# Patient Record
Sex: MALE | Race: WHITE | NOT HISPANIC OR LATINO | Employment: OTHER | URBAN - METROPOLITAN AREA
[De-identification: names, ages, dates, MRNs, and addresses within clinical notes are randomized per-mention and may not be internally consistent; named-entity substitution may affect disease eponyms.]

---

## 2019-01-15 ENCOUNTER — OFFICE VISIT (OUTPATIENT)
Dept: URGENT CARE | Facility: CLINIC | Age: 69
End: 2019-01-15
Payer: MEDICARE

## 2019-01-15 VITALS
TEMPERATURE: 97.8 F | DIASTOLIC BLOOD PRESSURE: 90 MMHG | HEART RATE: 61 BPM | OXYGEN SATURATION: 96 % | RESPIRATION RATE: 16 BRPM | HEIGHT: 77 IN | WEIGHT: 234 LBS | BODY MASS INDEX: 27.63 KG/M2 | SYSTOLIC BLOOD PRESSURE: 140 MMHG

## 2019-01-15 DIAGNOSIS — J98.8 RTI (RESPIRATORY TRACT INFECTION): ICD-10-CM

## 2019-01-15 DIAGNOSIS — I10 ESSENTIAL HYPERTENSION: ICD-10-CM

## 2019-01-15 PROCEDURE — 99204 OFFICE O/P NEW MOD 45 MIN: CPT | Performed by: FAMILY MEDICINE

## 2019-01-15 RX ORDER — ASPIRIN 300 MG/1
300 SUPPOSITORY RECTAL DAILY
COMMUNITY
End: 2019-02-11

## 2019-01-15 RX ORDER — AMOXICILLIN AND CLAVULANATE POTASSIUM 875; 125 MG/1; MG/1
1 TABLET, FILM COATED ORAL 2 TIMES DAILY
Qty: 14 TAB | Refills: 0 | Status: SHIPPED | OUTPATIENT
Start: 2019-01-15 | End: 2019-01-22

## 2019-01-15 RX ORDER — ALLOPURINOL 100 MG/1
100 TABLET ORAL DAILY
COMMUNITY
End: 2019-02-11

## 2019-01-15 RX ORDER — CHLORAL HYDRATE 500 MG
1000 CAPSULE ORAL
Status: ON HOLD | COMMUNITY
End: 2019-10-02

## 2019-01-15 ASSESSMENT — ENCOUNTER SYMPTOMS
FEVER: 0
CHILLS: 0
SORE THROAT: 1
FOCAL WEAKNESS: 0
ORTHOPNEA: 0
SHORTNESS OF BREATH: 0
COUGH: 1
HEMOPTYSIS: 0
DIZZINESS: 0

## 2019-01-15 NOTE — PROGRESS NOTES
"Subjective:      Gopal Valerio is a 68 y.o. male who presents with Sinus Pain (cold, sinus issues x 2 weeks)    - This is a very pleasant, well and non-toxic appearing 68 y.o. male with complaints of 2 wks w/ cough/ST/sinus congestion. Improving but going out of town and worried will get worse. No NVFC/cp/sob    - Hx HTN, stable on meds.           ALLERGIES:  Patient has no allergy information on record.     PMH:  History reviewed. No pertinent past medical history.     PSH:  History reviewed. No pertinent surgical history.    MEDS:    Current Outpatient Prescriptions:   •  Omega-3 Fatty Acids (FISH OIL) 1000 MG Cap capsule, Take 1,000 mg by mouth 3 times a day, with meals., Disp: , Rfl:   •  aspirin (ASA) 300 MG Suppos, Insert 300 mg in rectum every day., Disp: , Rfl:   •  amoxicillin-clavulanate (AUGMENTIN) 875-125 MG Tab, Take 1 Tab by mouth 2 times a day for 7 days., Disp: 14 Tab, Rfl: 0    ** I have documented what I find to be significant in regards to past medical, social, family and surgical history  in my HPI or under PMH/PSH/FH review section, otherwise it is contributory **             HPI    Review of Systems   Constitutional: Negative for chills and fever.   HENT: Positive for congestion and sore throat.    Respiratory: Positive for cough. Negative for hemoptysis and shortness of breath.    Cardiovascular: Negative for chest pain and orthopnea.   Neurological: Negative for dizziness and focal weakness.   All other systems reviewed and are negative.         Objective:     /90 (BP Location: Right arm, Patient Position: Sitting, BP Cuff Size: Adult)   Pulse 61   Temp 36.6 °C (97.8 °F) (Temporal)   Resp 16   Ht 1.956 m (6' 5\")   Wt 106.1 kg (234 lb)   SpO2 96%   BMI 27.75 kg/m²      Physical Exam   Constitutional: He appears well-developed. No distress.   HENT:   Head: Normocephalic and atraumatic.   Nose: Nose normal.   Mouth/Throat: Oropharynx is clear and moist. No oropharyngeal exudate. "   Eyes: Conjunctivae are normal.   Neck: Neck supple.   Cardiovascular: Regular rhythm and normal heart sounds.  Exam reveals no gallop and no friction rub.    No murmur heard.  Pulmonary/Chest: Effort normal and breath sounds normal. No respiratory distress. He has no wheezes. He has no rales. He exhibits no tenderness.   Neurological: He is alert. No cranial nerve deficit. He exhibits normal muscle tone.   Skin: Skin is warm and dry.   Psychiatric: He has a normal mood and affect. Judgment normal.   Nursing note and vitals reviewed.              Assessment/Plan:         1. RTI (respiratory tract infection)  amoxicillin-clavulanate (AUGMENTIN) 875-125 MG Tab   2. Essential hypertension           Patient given contingency paper Rx for abx. Discussed when to fill.      Dx & d/c instructions discussed w/ patient and/or family members.     ER precautions (worsening signs symptoms and when to go to ER) discussed.    Follow up w/ PCP in 2-3 days to make sure symptoms improving and no further intervention/treatment and/or work-up needed was advised, ER if feeling worse or not improving in 2 days.    Possible side effects (i.e. Rash, GI upset/constipation, sedation, elevation of BP or sugars) of any medications given discussed.     Patient left in stable condition

## 2019-02-11 ENCOUNTER — OFFICE VISIT (OUTPATIENT)
Dept: MEDICAL GROUP | Facility: MEDICAL CENTER | Age: 69
End: 2019-02-11
Payer: MEDICARE

## 2019-02-11 VITALS
BODY MASS INDEX: 27.63 KG/M2 | OXYGEN SATURATION: 99 % | TEMPERATURE: 97.1 F | SYSTOLIC BLOOD PRESSURE: 160 MMHG | HEIGHT: 77 IN | WEIGHT: 234 LBS | RESPIRATION RATE: 14 BRPM | HEART RATE: 60 BPM | DIASTOLIC BLOOD PRESSURE: 102 MMHG

## 2019-02-11 DIAGNOSIS — I10 ESSENTIAL HYPERTENSION: ICD-10-CM

## 2019-02-11 DIAGNOSIS — Z86.79 HISTORY OF VARICOCELE: ICD-10-CM

## 2019-02-11 DIAGNOSIS — Z12.5 ENCOUNTER FOR SCREENING FOR MALIGNANT NEOPLASM OF PROSTATE: ICD-10-CM

## 2019-02-11 DIAGNOSIS — I48.0 PAROXYSMAL ATRIAL FIBRILLATION (HCC): ICD-10-CM

## 2019-02-11 DIAGNOSIS — Z23 NEED FOR VACCINATION: ICD-10-CM

## 2019-02-11 DIAGNOSIS — Z00.00 WELL ADULT EXAM: ICD-10-CM

## 2019-02-11 DIAGNOSIS — C44.310 BASAL CELL CARCINOMA (BCC) OF SKIN OF FACE, UNSPECIFIED PART OF FACE: ICD-10-CM

## 2019-02-11 PROCEDURE — 99204 OFFICE O/P NEW MOD 45 MIN: CPT | Performed by: INTERNAL MEDICINE

## 2019-02-11 RX ORDER — AMLODIPINE BESYLATE 5 MG/1
5 TABLET ORAL DAILY
COMMUNITY
End: 2019-02-11 | Stop reason: SDUPTHER

## 2019-02-11 RX ORDER — ASPIRIN 81 MG/1
81 TABLET, CHEWABLE ORAL DAILY
Qty: 100 TAB | Refills: 3 | Status: ON HOLD | OUTPATIENT
Start: 2019-02-11 | End: 2019-10-02

## 2019-02-11 RX ORDER — AMLODIPINE BESYLATE 5 MG/1
5 TABLET ORAL DAILY
Qty: 90 TAB | Refills: 3 | Status: SHIPPED | OUTPATIENT
Start: 2019-02-11 | End: 2019-03-11

## 2019-02-11 ASSESSMENT — PATIENT HEALTH QUESTIONNAIRE - PHQ9: CLINICAL INTERPRETATION OF PHQ2 SCORE: 0

## 2019-02-11 NOTE — LETTER
[facility]  Radha Culp M.D.  [address]  [place] [id number]  Fax: [phone]   Authorization for Release/Disclosure of   Protected Health Information   Name: LUCILLE MALLORY JR. : 1950 SSN: [id number]   Address: [address] [place] [address] [place] [id number] Phone:    [phone] (home)    I authorize the entity listed below to release/disclose the PHI below to:   [facility]/Radha Culp M.D. and Radha Culp M.D.   Provider or Entity Name:     Address   City, State, Zip   Phone:      Fax:     Reason for request: continuity of care   Information to be released:    [  ] LAST COLONOSCOPY,  including any PATH REPORT and follow-up  [  ] LAST FIT/COLOGUARD RESULT [  ] LAST DEXA  [  ] LAST MAMMOGRAM  [  ] LAST PAP  [  ] LAST LABS [  ] RETINA EXAM REPORT  [  ] IMMUNIZATION RECORDS  [  ] Release all info      [  ] Check here and initial the line next to each item to release ALL health information INCLUDING  _____ Care and treatment for drug and / or alcohol abuse  _____ HIV testing, infection status, or AIDS  _____ Genetic Testing    DATES OF SERVICE OR TIME PERIOD TO BE DISCLOSED: _____________  I understand and acknowledge that:  * This Authorization may be revoked at any time by you in writing, except if your health information has already been used or disclosed.  * Your health information that will be used or disclosed as a result of you signing this authorization could be re-disclosed by the recipient. If this occurs, your re-disclosed health information may no longer be protected by State or Federal laws.  * You may refuse to sign this Authorization. Your refusal will not affect your ability to obtain treatment.  * This Authorization becomes effective upon signing and will  on (date) __________.      If no date is indicated, this Authorization will  one (1) year from the signature date.    Name: Lucille Mallory Jr.    Signature:   Date:          2/11/2019       PLEASE FAX REQUESTED RECORDS BACK TO: [phone]

## 2019-02-11 NOTE — PROGRESS NOTES
CC:  Diagnoses of Essential hypertension, Need for vaccination, Well adult exam, Encounter for screening for malignant neoplasm of prostate , Paroxysmal atrial fibrillation (HCC), Basal cell carcinoma (BCC) of skin of face, unspecified part of face, and Squamous cell carcinoma were pertinent to this visit.    HISTORY OF THE PRESENT ILLNESS: Patient is a 68 y.o. male. This pleasant patient is here today to establish care.    Patient indicates he has a history of hypertension that has been generally well controlled at home on amlodipine 5 mg.  He says he checks his blood pressure at home approximately once per week and is typically 130/75.  He exercises at least 3 times per week and when he is really exerting himself he notices sometimes he gets a little lightheaded and he checks his blood pressure with systolic 110s.  Denies angina, dyspnea, leg edema, other cardiopulmonary symptoms with exercise.  He indicates he has whitecoat hypertension and is elevated in clinic.  Typically drinks 2 cocktails or glasses of wine per night, did have more alcohol recently at a dinner party.  He does not add salt but indicates he will check to see how much she has daily.  He is interested in checking his blood pressure more regularly at home and doing some behavior modifications and follow up in 1 month to see what the recording is.    History of paroxysmal atrial fibrillation he says he saw Dr. Monk in [facility] approximately 4 years ago and he indicates that workup was fine, he was advised just to use baby aspirin daily.  At the time of his evaluation he was experiencing a sensation of irregular heartbeat he says with hiking while dehydrated.  He had no ischemic symptoms at that time.  He says typically his heart rate is in the 50s.    Sees Dr. Hinson every 6 months for history of basal and squamous cell carcinoma of the face and back.  No focal acute skin concerns today.    Additionally he sees Dr. Lovelace in  [place] for history of what sounds like digital rectal exam with irregular prostate contour however patient reports his PSA was very low at 0.05 and so this was just being monitored.  He denies any urinary symptoms.    Allergies: Molds & smuts    Current Outpatient Prescriptions Ordered in [facility]   Medication Sig Dispense Refill   • aspirin (ASA) 81 MG Chew Tab chewable tablet Take 1 Tab by mouth every day. 100 Tab 3   • amLODIPine (NORVASC) 5 MG Tab Take 1 Tab by mouth every day. 90 Tab 3   • Zoster Vac Recomb Adjuvanted (SHINGRIX) 50 MCG Recon Susp 0.5 mL by Intramuscular route Once for 1 dose. 0.5 mL 1   • Omega-3 Fatty Acids (FISH OIL) 1000 MG Cap capsule Take 1,000 mg by mouth 3 times a day, with meals.       No current Epic-ordered facility-administered medications on file.        Past Medical History:   Diagnosis Date   • Arrhythmia     Afib   • Cancer (HCC)     Basal cell/ Squamous skin   • Hypertension        Past Surgical History:   Procedure Laterality Date   • OTHER      mohs to nose, back       Social History   Substance Use Topics   • Smoking status: Never Smoker   • Smokeless tobacco: Never Used   • Alcohol use Yes      Comment: 2 glasses wine/night       Social History     Social History Narrative   • No narrative on file       Family History   Problem Relation Age of Onset   • Cancer Mother         bladder non-smoker   • Dementia Father    • Cancer Brother         prostate age 52       ROS:     - Constitutional: Negative for fever, chills, unexpected weight change, night sweats    - Eyes:   Negative for blurry vission, eye pain, discharge    - ENT:  Negative for hearing changes, ear pain, ear discharge, rhinorrhea, sinus congestion, sore throat. He has recovered from his URI/cold.     - Respiratory: Negative for cough, sputum production, chest congestion, dyspnea, wheezing, and crackles.      - Cardiovascular: Negative for chest pain, palpitations, orthopnea, and bilateral lower  "extremity edema.     - Gastrointestinal: Negative for heartburn, nausea, vomiting, abdominal pain, hematochezia, melena, diarrhea, constipation, and greasy/foul-smelling stools.     - Genitourinary: Negative for dysuria, polyuria, hematuria, pyuria, urinary urgency, and urinary incontinence.     - Musculoskeletal: Negative for myalgias, back pain, and joint pain.     - Skin: Negative for rash, itching, cyanotic skin color change.     - Neurological: Negative for migraines, numbness, ataxia, tremors, vertigo    - Endo:Negative for polyuria, heat/cold intolerance, excessive thirst    - Hem/lymphatic: Negative for easy bruising, blood clots, lymphedema, swollen glands    -Allergic/immun: Negative for allergic rhinitis    - Psychiatric/Behavioral: Negative for depression, suicidal/homicidal ideation and memory loss.      Exam: Blood pressure 160/102, pulse 60, temperature 36.2 °C (97.1 °F), temperature source Temporal, resp. rate 14, height 1.956 m (6' 5\"), weight 106.1 kg (234 lb), SpO2 99 %. Body mass index is 27.75 kg/m².    General: Normal appearing. No distress.  EYES: Conjunctiva clear lids without ptosis, pupils equal  EARS: Normal shape and contour   NOSE, THROAT: nasal mucosa benign. oropharynx is without erythema, edema or exudates.   Neck: Supple without LAD. Thyroid is not enlarged.  Pulmonary: Clear to ausculation.  Normal effort. No rales or wheezing.  Cardiovascular: Regular rate and rhythm without significant murmur.   Abdomen: Soft, nontender, nondistended. Normal bowel sounds.  Neurologic: Cranial nerves grossly nonfocal  Lymph: No cervical, supraclavicular nodes palpable  Skin: Warm and dry.  No obvious lesions.  Musculoskeletal: Normal gait. No extremity cyanosis, clubbing, or edema.  Psych: Normal mood and affect. Alert and oriented x3. Judgment and insight is normal.        Assessment/Plan  1. Essential hypertension  Rechecked blood pressure during encounter today, it was the same.  He will check " his blood pressure daily at home and email me with results or we will follow-up in 1 month.  Also advised patient to stay under 2 g of salt daily, consider alcohol abstinence to see if this has any effect.  Advised patient that chronically elevated blood pressure can lead to ischemia, heart failure, etc. and he expressed understanding.  - aspirin (ASA) 81 MG Chew Tab chewable tablet; Take 1 Tab by mouth every day.  Dispense: 100 Tab; Refill: 3  - amLODIPine (NORVASC) 5 MG Tab; Take 1 Tab by mouth every day.  Dispense: 90 Tab; Refill: 3    2. Need for vaccination  - Zoster Vac Recomb Adjuvanted (SHINGRIX) 50 MCG Recon Susp; 0.5 mL by Intramuscular route Once for 1 dose.  Dispense: 0.5 mL; Refill: 1    3. Well adult exam  - CBC WITH DIFFERENTIAL; Future  - Comp Metabolic Panel; Future  - URINALYSIS; Future  - PROSTATE SPECIFIC AG SCREENING; Future  - Lipid Profile; Future    4. Encounter for screening for malignant neoplasm of prostate   Asymptomatic at this time  - PROSTATE SPECIFIC AG SCREENING; Future    5. Paroxysmal atrial fibrillation (HCC)  Records requested from his cardiologist in [place].   Heart rate is in the 60s, he has no current or former history of strokelike symptoms.  Patient prefers baby aspirin at this time for anticoagulation.  It does not sound like he has had recurrence of atrial fibrillation based on his symptomatic history.  However his [id number] score is 2.    6. Basal cell carcinoma (BCC) of skin of face, unspecified part of face  Follow-up with his dermatologist every 6 months    7. Squamous cell carcinoma  Follow-up with his dermatologist every 6 months      HM:  PCP records requested today, he says he has had the pneumonia vaccine.  Shingles vaccine prescription given to him today.  He said he had his colonoscopy 2016 1 polyp due again in 5 years.    Return to clinic 1 month follow-up labs and hypertension        Please note that this dictation was created using voice recognition  software. I have made every reasonable attempt to correct obvious errors, but I expect that there are errors of grammar and possibly content that I did not discover before finalizing the note.

## 2019-03-01 ENCOUNTER — HOSPITAL ENCOUNTER (OUTPATIENT)
Dept: LAB | Facility: MEDICAL CENTER | Age: 69
End: 2019-03-01
Attending: INTERNAL MEDICINE
Payer: MEDICARE

## 2019-03-01 DIAGNOSIS — Z00.00 WELL ADULT EXAM: ICD-10-CM

## 2019-03-01 DIAGNOSIS — Z12.5 ENCOUNTER FOR SCREENING FOR MALIGNANT NEOPLASM OF PROSTATE: ICD-10-CM

## 2019-03-01 LAB
ALBUMIN SERPL BCP-MCNC: 4.5 G/DL (ref 3.2–4.9)
ALBUMIN/GLOB SERPL: 1.8 G/DL
ALP SERPL-CCNC: 72 U/L (ref 30–99)
ALT SERPL-CCNC: 35 U/L (ref 2–50)
ANION GAP SERPL CALC-SCNC: 3 MMOL/L (ref 0–11.9)
APPEARANCE UR: CLEAR
AST SERPL-CCNC: 20 U/L (ref 12–45)
BASOPHILS # BLD AUTO: 0.7 % (ref 0–1.8)
BASOPHILS # BLD: 0.03 K/UL (ref 0–0.12)
BILIRUB SERPL-MCNC: 1 MG/DL (ref 0.1–1.5)
BILIRUB UR QL STRIP.AUTO: NEGATIVE
BUN SERPL-MCNC: 12 MG/DL (ref 8–22)
CALCIUM SERPL-MCNC: 9.9 MG/DL (ref 8.5–10.5)
CHLORIDE SERPL-SCNC: 102 MMOL/L (ref 96–112)
CHOLEST SERPL-MCNC: 178 MG/DL (ref 100–199)
CO2 SERPL-SCNC: 30 MMOL/L (ref 20–33)
COLOR UR: YELLOW
CREAT SERPL-MCNC: 0.9 MG/DL (ref 0.5–1.4)
EOSINOPHIL # BLD AUTO: 0.07 K/UL (ref 0–0.51)
EOSINOPHIL NFR BLD: 1.7 % (ref 0–6.9)
ERYTHROCYTE [DISTWIDTH] IN BLOOD BY AUTOMATED COUNT: 42.4 FL (ref 35.9–50)
FASTING STATUS PATIENT QL REPORTED: NORMAL
GLOBULIN SER CALC-MCNC: 2.5 G/DL (ref 1.9–3.5)
GLUCOSE SERPL-MCNC: 108 MG/DL (ref 65–99)
GLUCOSE UR STRIP.AUTO-MCNC: NEGATIVE MG/DL
HCT VFR BLD AUTO: 49.2 % (ref 42–52)
HDLC SERPL-MCNC: 51 MG/DL
HGB BLD-MCNC: 15.9 G/DL (ref 14–18)
IMM GRANULOCYTES # BLD AUTO: 0.01 K/UL (ref 0–0.11)
IMM GRANULOCYTES NFR BLD AUTO: 0.2 % (ref 0–0.9)
KETONES UR STRIP.AUTO-MCNC: NEGATIVE MG/DL
LDLC SERPL CALC-MCNC: 101 MG/DL
LEUKOCYTE ESTERASE UR QL STRIP.AUTO: NEGATIVE
LYMPHOCYTES # BLD AUTO: 1.4 K/UL (ref 1–4.8)
LYMPHOCYTES NFR BLD: 33.4 % (ref 22–41)
MCH RBC QN AUTO: 29.2 PG (ref 27–33)
MCHC RBC AUTO-ENTMCNC: 32.3 G/DL (ref 33.7–35.3)
MCV RBC AUTO: 90.3 FL (ref 81.4–97.8)
MICRO URNS: NORMAL
MONOCYTES # BLD AUTO: 0.27 K/UL (ref 0–0.85)
MONOCYTES NFR BLD AUTO: 6.4 % (ref 0–13.4)
NEUTROPHILS # BLD AUTO: 2.41 K/UL (ref 1.82–7.42)
NEUTROPHILS NFR BLD: 57.6 % (ref 44–72)
NITRITE UR QL STRIP.AUTO: NEGATIVE
NRBC # BLD AUTO: 0 K/UL
NRBC BLD-RTO: 0 /100 WBC
PH UR STRIP.AUTO: 7 [PH]
PLATELET # BLD AUTO: 188 K/UL (ref 164–446)
PMV BLD AUTO: 9.4 FL (ref 9–12.9)
POTASSIUM SERPL-SCNC: 4.2 MMOL/L (ref 3.6–5.5)
PROT SERPL-MCNC: 7 G/DL (ref 6–8.2)
PROT UR QL STRIP: NEGATIVE MG/DL
PSA SERPL-MCNC: 1.21 NG/ML (ref 0–4)
RBC # BLD AUTO: 5.45 M/UL (ref 4.7–6.1)
RBC UR QL AUTO: NEGATIVE
SODIUM SERPL-SCNC: 135 MMOL/L (ref 135–145)
SP GR UR STRIP.AUTO: 1.01
TRIGL SERPL-MCNC: 131 MG/DL (ref 0–149)
UROBILINOGEN UR STRIP.AUTO-MCNC: 0.2 MG/DL
WBC # BLD AUTO: 4.2 K/UL (ref 4.8–10.8)

## 2019-03-01 PROCEDURE — 85025 COMPLETE CBC W/AUTO DIFF WBC: CPT | Mod: GA

## 2019-03-01 PROCEDURE — 36415 COLL VENOUS BLD VENIPUNCTURE: CPT | Mod: GA

## 2019-03-01 PROCEDURE — 80061 LIPID PANEL: CPT | Mod: GA

## 2019-03-01 PROCEDURE — 80053 COMPREHEN METABOLIC PANEL: CPT

## 2019-03-01 PROCEDURE — 84153 ASSAY OF PSA TOTAL: CPT | Mod: GA

## 2019-03-01 PROCEDURE — 81003 URINALYSIS AUTO W/O SCOPE: CPT | Mod: GY

## 2019-03-11 ENCOUNTER — OFFICE VISIT (OUTPATIENT)
Dept: MEDICAL GROUP | Facility: MEDICAL CENTER | Age: 69
End: 2019-03-11
Payer: MEDICARE

## 2019-03-11 VITALS
HEIGHT: 77 IN | WEIGHT: 237.66 LBS | OXYGEN SATURATION: 98 % | HEART RATE: 56 BPM | SYSTOLIC BLOOD PRESSURE: 142 MMHG | TEMPERATURE: 98.2 F | BODY MASS INDEX: 28.06 KG/M2 | DIASTOLIC BLOOD PRESSURE: 88 MMHG

## 2019-03-11 DIAGNOSIS — I48.0 PAROXYSMAL ATRIAL FIBRILLATION (HCC): ICD-10-CM

## 2019-03-11 DIAGNOSIS — I10 ESSENTIAL HYPERTENSION: ICD-10-CM

## 2019-03-11 DIAGNOSIS — R73.01 IMPAIRED FASTING GLUCOSE: ICD-10-CM

## 2019-03-11 DIAGNOSIS — M79.671 RIGHT FOOT PAIN: ICD-10-CM

## 2019-03-11 PROCEDURE — 99214 OFFICE O/P EST MOD 30 MIN: CPT | Performed by: INTERNAL MEDICINE

## 2019-03-11 RX ORDER — VALSARTAN 80 MG/1
80 TABLET ORAL DAILY
Qty: 30 TAB | Refills: 3 | Status: SHIPPED | OUTPATIENT
Start: 2019-03-11 | End: 2019-07-04 | Stop reason: SDUPTHER

## 2019-03-11 NOTE — PROGRESS NOTES
"CC:  Diagnoses of Essential hypertension, Impaired fasting glucose, Paroxysmal atrial fibrillation (HCC), and Right foot pain were pertinent to this visit.    HISTORY OF THE PRESENT ILLNESS: Patient is a 68 y.o. male. This pleasant patient is here today to follow-up blood pressure which is been uncontrolled on labs.    Since last visit the records that were requested, including cardiology notes, have not been received.  Patient says he has had prior intolerances to many blood pressure medications.  He says that the amlodipine 5 mg makes him feel \"off\" when asked to elaborate he says it makes him feel more like a \"zombie \"where he feels that he moves slower.  This reaction he says lasts pretty much all day.  Blood pressure remains uncontrolled 142/88.  He denies any focal weakness or strokelike symptoms, dizziness, chest pain.  With his history of very remote paroxysmal atrial fibrillation, and no cardiology records received, he is agreeable to an echocardiogram to further assess his risk of recurrent arrhythmia by assessing his atria, as well as his left ventricle with his history of hypertension.  He does continue on his aspirin daily.  He has control of his heart rate in the 50s.  He is willing to try valsartan for blood pressure control.  His home recordings systolic range 139 at 147 and diastolic 80 up to 91.    Cholesterol is reasonable, total cholesterol 170, triglyceride 131, HDL 51,  labs from 3/1/19.  He does have some elevated cardiovascular risk given his uncontrolled hypertension, also non-modifiable risk of age.  This was discussed with patient and at this time he wishes to proceed with blood pressure control.    His fasting glucose was elevated at 108, I discussed w/the patient this could be a sign of prediabetes and he is agreeable to checking hemoglobin A1c.  Complete metabolic panel is normal, GFR normal.    He says for more than 6 months he has had some mid lateral right foot pain " particularly with pain lateral pressure on his foot.  Pain is generally mild.  No pain with walking.  No prior trauma.  No rash.  He wears supportive shoes.  He would like to see a podiatrist.    Allergies: Molds & smuts    Current Outpatient Prescriptions Ordered in [facility]   Medication Sig Dispense Refill   • Magnesium-Zinc (MAGNESIUM-CHELATED ZINC PO) Take  by mouth.     • valsartan (DIOVAN) 80 MG Tab Take 1 Tab by mouth every day. 30 Tab 3   • aspirin (ASA) 81 MG Chew Tab chewable tablet Take 1 Tab by mouth every day. 100 Tab 3   • Omega-3 Fatty Acids (FISH OIL) 1000 MG Cap capsule Take 1,000 mg by mouth 3 times a day, with meals.       No current Epic-ordered facility-administered medications on file.        Past Medical History:   Diagnosis Date   • Arrhythmia     Afib   • Cancer (HCC)     Basal cell/ Squamous skin   • Hypertension        Past Surgical History:   Procedure Laterality Date   • OTHER      mohs to nose, back       Social History   Substance Use Topics   • Smoking status: Never Smoker   • Smokeless tobacco: Never Used   • Alcohol use Yes      Comment: 2 glasses wine/night       Social History     Social History Narrative   • No narrative on file       Family History   Problem Relation Age of Onset   • Cancer Mother         bladder non-smoker   • Dementia Father    • Cancer Brother         prostate age 52       ROS:     - Constitutional: Negative for fever, chills, unexpected weight change, night sweats    - Eyes:   Negative for blurry vision, eye pain, discharge    - ENT:  Negative for hearing changes, ear pain, ear discharge, rhinorrhea, sinus congestion, sore throat     - Respiratory: Negative for cough, sputum production, chest congestion, dyspnea, wheezing, and crackles.      - Cardiovascular: Negative for chest pain, palpitations, orthopnea, and bilateral lower extremity edema.     - Gastrointestinal: Negative for heartburn, nausea, vomiting, abdominal pain, hematochezia, melena, diarrhea, and  "greasy/foul-smelling stools.  Chronic intermittent constipation without change for many years, easily controlled with as needed medications if needed.    - Genitourinary: Negative for dysuria, polyuria, hematuria, pyuria, urinary urgency, and urinary incontinence.     - Musculoskeletal: See HPI    - Skin: Negative for rash, itching, cyanotic skin color change.     - Neurological: Negative for migraines, numbness, ataxia, tremors, vertigo    - Endo:Negative for polyuria, heat/cold intolerance, excessive thirst    - Hem/lymphatic: Negative for easy bruising, blood clots, lymphedema, swollen glands    -Allergic/immun: Negative for allergic rhinitis    - Psychiatric/Behavioral: Negative for depression, suicidal/homicidal ideation and memory loss.      Exam: Blood pressure 142/88, pulse (!) 56, temperature 36.8 °C (98.2 °F), temperature source Temporal, height 1.956 m (6' 5\"), weight 107.8 kg (237 lb 10.5 oz), SpO2 98 %. Body mass index is 28.18 kg/m².    General: Normal appearing. No distress.  EYES: Conjunctiva clear lids without ptosis, pupils equal  Neck: Supple without LAD. Thyroid is not enlarged.  Pulmonary: Clear to ausculation.  Normal effort. No rales or wheezing.  Cardiovascular: Regular rate and rhythm without significant murmur.   Abdomen: Soft, nontender, nondistended. Normal bowel sounds.  Neurologic: Cranial nerves grossly nonfocal  Lymph: No cervical, supraclavicular nodes palpable  Skin: Warm and dry.  No obvious lesions.  Musculoskeletal: Normal gait. No extremity cyanosis, clubbing, or edema.  Examination of his right lateral foot, approximately midway, there is approximately 1 cm area of very slight redness, no heat/skin sore or signs of cellulitis, it is a little bit tender to palpation and there is prominence of bone in this region.  Distal pedal posterior tibial pulses are 2+, good capillary refill.  Psych: Normal mood and affect. Alert and oriented x3. Judgment and insight is " normal.        Assessment/Plan  1. Essential hypertension  Uncontrolled, patient indicates reaction to amlodipine.  Discontinue amlodipine and start valsartan.  Patient to email me in 4 days so we can titrate his blood pressure medication out of clinic.  Assuming he tolerates valsartan he will get labs in 2 weeks to check his electrolytes.  Goal blood pressure approximately 120/80.  - valsartan (DIOVAN) 80 MG Tab; Take 1 Tab by mouth every day.  Dispense: 30 Tab; Refill: 3  - Basic Metabolic Panel; Future    2. Impaired fasting glucose  Patient to watch diet, have regular exercise, watch weight  - HEMOGLOBIN A1C; Future    3. Paroxysmal atrial fibrillation (HCC)  We have not received any of his prior cardiology notes.  [id number] 2 score of 2.  He prefers aspirin at this time.  Heart rate controlled.  - EC-ECHOCARDIOGRAM COMPLETE W/O CONT; Future    4. Right foot pain  New issue.   ?bone spur/ osteophyte  - REFERRAL TO PODIATRY      Return to clinic 1 month to follow-up blood pressure control, if blood pressure is controlled in 1 month we can push out his appointment.        Please note that this dictation was created using voice recognition software. I have made every reasonable attempt to correct obvious errors, but I expect that there are errors of grammar and possibly content that I did not discover before finalizing the note.

## 2019-03-21 ENCOUNTER — HOSPITAL ENCOUNTER (OUTPATIENT)
Dept: LAB | Facility: MEDICAL CENTER | Age: 69
End: 2019-03-21
Attending: INTERNAL MEDICINE
Payer: MEDICARE

## 2019-03-21 DIAGNOSIS — I10 ESSENTIAL HYPERTENSION: ICD-10-CM

## 2019-03-21 DIAGNOSIS — R73.01 IMPAIRED FASTING GLUCOSE: ICD-10-CM

## 2019-03-21 LAB
ANION GAP SERPL CALC-SCNC: 9 MMOL/L (ref 0–11.9)
BUN SERPL-MCNC: 15 MG/DL (ref 8–22)
CALCIUM SERPL-MCNC: 9 MG/DL (ref 8.5–10.5)
CHLORIDE SERPL-SCNC: 102 MMOL/L (ref 96–112)
CO2 SERPL-SCNC: 25 MMOL/L (ref 20–33)
CREAT SERPL-MCNC: 0.92 MG/DL (ref 0.5–1.4)
EST. AVERAGE GLUCOSE BLD GHB EST-MCNC: 117 MG/DL
FASTING STATUS PATIENT QL REPORTED: NORMAL
GLUCOSE SERPL-MCNC: 90 MG/DL (ref 65–99)
HBA1C MFR BLD: 5.7 % (ref 0–5.6)
POTASSIUM SERPL-SCNC: 4.1 MMOL/L (ref 3.6–5.5)
SODIUM SERPL-SCNC: 136 MMOL/L (ref 135–145)

## 2019-03-21 PROCEDURE — 36415 COLL VENOUS BLD VENIPUNCTURE: CPT

## 2019-03-21 PROCEDURE — 83036 HEMOGLOBIN GLYCOSYLATED A1C: CPT

## 2019-03-21 PROCEDURE — 80048 BASIC METABOLIC PNL TOTAL CA: CPT

## 2019-04-09 ENCOUNTER — HOSPITAL ENCOUNTER (OUTPATIENT)
Dept: CARDIOLOGY | Facility: MEDICAL CENTER | Age: 69
End: 2019-04-09
Attending: INTERNAL MEDICINE
Payer: MEDICARE

## 2019-04-09 DIAGNOSIS — I48.0 PAROXYSMAL ATRIAL FIBRILLATION (HCC): ICD-10-CM

## 2019-04-09 LAB
LV EJECT FRACT  99904: 60
LV EJECT FRACT MOD 2C 99903: 58.01
LV EJECT FRACT MOD 4C 99902: 57.17
LV EJECT FRACT MOD BP 99901: 57.3

## 2019-04-09 PROCEDURE — 93306 TTE W/DOPPLER COMPLETE: CPT | Mod: 26 | Performed by: INTERNAL MEDICINE

## 2019-04-09 PROCEDURE — 93306 TTE W/DOPPLER COMPLETE: CPT

## 2019-04-22 ENCOUNTER — OFFICE VISIT (OUTPATIENT)
Dept: MEDICAL GROUP | Facility: MEDICAL CENTER | Age: 69
End: 2019-04-22
Payer: MEDICARE

## 2019-04-22 VITALS
DIASTOLIC BLOOD PRESSURE: 82 MMHG | BODY MASS INDEX: 28.27 KG/M2 | WEIGHT: 239.42 LBS | HEIGHT: 77 IN | OXYGEN SATURATION: 95 % | HEART RATE: 58 BPM | SYSTOLIC BLOOD PRESSURE: 142 MMHG | TEMPERATURE: 97.1 F

## 2019-04-22 DIAGNOSIS — Z80.42 FAMILY HISTORY OF PROSTATE CANCER: ICD-10-CM

## 2019-04-22 DIAGNOSIS — R73.01 IMPAIRED FASTING GLUCOSE: ICD-10-CM

## 2019-04-22 DIAGNOSIS — M79.671 RIGHT FOOT PAIN: ICD-10-CM

## 2019-04-22 DIAGNOSIS — C44.311 BASAL CELL CARCINOMA (BCC) OF SKIN OF NOSE: ICD-10-CM

## 2019-04-22 DIAGNOSIS — Z12.11 SCREENING FOR COLON CANCER: ICD-10-CM

## 2019-04-22 DIAGNOSIS — I10 ESSENTIAL HYPERTENSION: ICD-10-CM

## 2019-04-22 DIAGNOSIS — Z23 NEED FOR VACCINATION: ICD-10-CM

## 2019-04-22 DIAGNOSIS — I48.0 PAROXYSMAL ATRIAL FIBRILLATION (HCC): ICD-10-CM

## 2019-04-22 PROCEDURE — 99214 OFFICE O/P EST MOD 30 MIN: CPT | Mod: 25 | Performed by: INTERNAL MEDICINE

## 2019-04-22 PROCEDURE — 90732 PPSV23 VACC 2 YRS+ SUBQ/IM: CPT | Performed by: INTERNAL MEDICINE

## 2019-04-22 PROCEDURE — G0009 ADMIN PNEUMOCOCCAL VACCINE: HCPCS | Performed by: INTERNAL MEDICINE

## 2019-04-23 NOTE — PROGRESS NOTES
CC:  Diagnoses of Family history of prostate cancer, Basal cell carcinoma (BCC) of skin of nose, Squamous cell carcinoma, Essential hypertension, Right foot pain, Impaired fasting glucose, Need for vaccination, Screening for colon cancer, and Paroxysmal atrial fibrillation (HCC) were pertinent to this visit.    HISTORY OF THE PRESENT ILLNESS: Patient is a 68 y.o. male. This pleasant patient is here today to follow-up.    Essential hypertension  Patient indicates that he feels that he tolerates valsartan much better than his prior amlodipine, overall he says he feels significantly less groggy on this medication.  He feels his blood pressure is overall controlled at home.  Systolic average is probably around 130, diastolic 80s.  After exercise he says his systolic blood pressure gets to be about 110.  He does not wish to have his blood pressure medication further up titrated this time.    Paroxysmal atrial fibrillation (HCC)  We reviewed his echocardiogram together in clinic from 4/9/19 normal ejection fraction 60%, mild LVH, mild RV enlargement with no significant tricuspid regurg, normal left atrium, trace MR. denies symptoms of sleep apnea, no underlying pulmonary disease known, no history of blood clots.  Patient will obtain his cardiology records for next appointment since we have not received them.  We did review together medical records scanned from 4/17/19 today in clinic.  He gives me verbal report that his last episode of paroxysmal atrial fibrillation was 9 years ago, he had one other episode a year from that as well.  Both episodes were symptomatic, he felt that he had any irregular heartbeat.  No recurrence since then.  He feels his atrial fibrillation was triggered in the setting of dehydration and extreme physical exertion.  There was no associated chest pain.  When he exercises at the gym these days he has no cardiopulmonary symptoms.  He says he saw his cardiologist about 6 months ago and he is only  been told to remain aspirin, his cardiologist did not feel he needed stronger anticoagulation per patient.    Squamous cell carcinoma  History of basal cell carcinoma on the face, nose, forehead, etc. and squamous on the back.  He wishes to reestablish dermatology here.    Impaired fasting glucose  A1c of 5.7 on 3/21/19.  Patient will continue with regular exercise and monitoring diet.    Right foot pain  The podiatrist was very helpful and the orthotics have relieved his foot discomfort and he is back to [facility].    Family history of prostate cancer  He says he was seeing a urologist annually he wishes to transfer locally.  His brother had prostate cancer.  His last PSA was normal 1.21 on 3/11/19.  Patient says prior rectal exam there is some sort of irregularity of the prostate gland, he is not sure.  He denies having any lower urinary tract symptoms.  He has nocturia 0-1 times per night.  States stream is good.  He wishes to follow-up with urology.    Allergies: Molds & smuts    Current Outpatient Prescriptions Ordered in [facility]   Medication Sig Dispense Refill   • Magnesium-Zinc (MAGNESIUM-CHELATED ZINC PO) Take  by mouth.     • valsartan (DIOVAN) 80 MG Tab Take 1 Tab by mouth every day. 30 Tab 3   • aspirin (ASA) 81 MG Chew Tab chewable tablet Take 1 Tab by mouth every day. 100 Tab 3   • Omega-3 Fatty Acids (FISH OIL) 1000 MG Cap capsule Take 1,000 mg by mouth 3 times a day, with meals.       No current Epic-ordered facility-administered medications on file.        Past Medical History:   Diagnosis Date   • Arrhythmia     Afib   • Cancer (HCC)     Basal cell/ Squamous skin   • Hypertension        Past Surgical History:   Procedure Laterality Date   • OTHER      mohs to nose, back       Social History   Substance Use Topics   • Smoking status: Never Smoker   • Smokeless tobacco: Never Used   • Alcohol use Yes      Comment: 2 glasses wine/night       Social History     Social History Narrative   • No narrative on  "file       Family History   Problem Relation Age of Onset   • Cancer Mother         bladder non-smoker   • Dementia Father    • Cancer Brother         prostate age 52       ROS:     - Constitutional: Negative for fever, chills     - Eyes:   Negative for eye pain, discharge    - ENT:  Negative for hearing changes, ear pain, ear discharge, rhinorrhea, sinus congestion, sore throat     - Respiratory: Negative for cough, sputum production, chest congestion, dyspnea, wheezing, and crackles.      - Cardiovascular: Negative for chest pain, palpitations, orthopnea, and bilateral lower extremity edema.     - Gastrointestinal: Negative for heartburn, nausea, vomiting, abdominal pain, hematochezia, melena, diarrhea, constipation, and greasy/foul-smelling stools.     - Genitourinary: Negative for dysuria, polyuria, hematuria, pyuria, urinary urgency, and urinary incontinence.     - Musculoskeletal: Negative for myalgias, back pain, and joint pain.     - Skin: Negative for rash, itching, cyanotic skin color change.     - Neurological: Negative for migraines, numbness, ataxia, tremors, vertigo    - Endo:Negative for polyuria, heat/cold intolerance, excessive thirst    - Hem/lymphatic: Negative for easy bruising, blood clots, lymphedema, swollen glands    -Allergic/immun: Negative for allergic rhinitis    - Psychiatric/Behavioral: Negative for depression, suicidal/homicidal ideation and memory loss.      Exam: /82 (BP Location: Right arm, Patient Position: Sitting, BP Cuff Size: Adult)   Pulse (!) 58   Temp 36.2 °C (97.1 °F) (Temporal)   Ht 1.956 m (6' 5\")   Wt 108.6 kg (239 lb 6.7 oz)   SpO2 95%  Body mass index is 28.39 kg/m².    General: Normal appearing. No distress.  EYES: Conjunctiva clear lids without ptosis, pupils equal  EARS: Normal shape and contour   NOSE, THROAT: nasal mucosa benign. oropharynx is without erythema, edema or exudates.   Neck: Supple without LAD. Thyroid is not enlarged.  Pulmonary: Clear to " ausculation.  Normal effort. No rales or wheezing.  Cardiovascular: Regular rate and rhythm without significant murmur.   Abdomen: Soft, nontender, nondistended. Normal bowel sounds.  Neurologic: Cranial nerves grossly nonfocal  Lymph: No cervical, supraclavicular nodes palpable  Skin: Warm and dry.  No obvious lesions.  Musculoskeletal: Normal gait. No extremity cyanosis, clubbing, or edema.  Psych: Normal mood and affect. Alert and oriented x3. Judgment and insight is normal.        Assessment/Plan  1. Family history of prostate cancer  Patient request to continue his annual urology evaluations.  - REFERRAL TO UROLOGY    2. Basal cell carcinoma (BCC) of skin of nose  Patient request to continue with dermatology evaluations  - REFERRAL TO DERMATOLOGY    3. Squamous cell carcinoma  - REFERRAL TO DERMATOLOGY    4. Essential hypertension  Reasonably well controlled will continue valsartan and monitor.  Does have some mild LVH on recent echocardiogram from his prior history of blood pressure.    5. Right foot pain  Resolved with orthotics    6. Impaired fasting glucose  Continue diet and exercise    7. Need for vaccination  Pneumonia vaccine today, prior records did indicate he had Prevnar remotely.  - Pneumococal Polysaccharide Vaccine 23-Valent =>1YO SQ/IM    8. Screening for colon cancer  Records reviewed today show that his last colonoscopy was actually on 7/14/14 with a tubular adenoma transverse colon, patient tells me today that he was due 5 years from that last colonoscopy which would be July of this year.  He is asymptomatic.  - REFERRAL TO GI FOR COLONOSCOPY    9. Paroxysmal atrial fibrillation (HCC)  Continue aspirin, heart rate is controlled.  Reviewing old records today I do not see record of PAF, one note did say he had sinus bradycardia with presyncope.  Patient denies any chest pain, presyncope, symptoms of irregular heartbeat.  Patient will obtain his cardiology records for us to review together during  next clinic appointment.      Return to clinic 4 months or as needed    He will be on a waiting list for shingles vaccine      Please note that this dictation was created using voice recognition software. I have made every reasonable attempt to correct obvious errors, but I expect that there are errors of grammar and possibly content that I did not discover before finalizing the note.

## 2019-04-23 NOTE — ASSESSMENT & PLAN NOTE
Patient indicates that he feels that he tolerates valsartan much better than his prior amlodipine, overall he says he feels significantly less groggy on this medication.  He feels his blood pressure is overall controlled at home.  Systolic average is probably around 130, diastolic 80s.  After exercise he says his systolic blood pressure gets to be about 110.  He does not wish to have his blood pressure medication further up titrated this time.

## 2019-04-23 NOTE — ASSESSMENT & PLAN NOTE
History of basal cell carcinoma on the face, nose, forehead, etc. and squamous on the back.  He wishes to reestablish dermatology here.

## 2019-04-23 NOTE — ASSESSMENT & PLAN NOTE
We reviewed his echocardiogram together in clinic from 4/9/19 normal ejection fraction 60%, mild LVH, mild RV enlargement with no significant tricuspid regurg, normal left atrium, trace MR. denies symptoms of sleep apnea, no underlying pulmonary disease known, no history of blood clots.  Patient will obtain his cardiology records for next appointment since we have not received them.  We did review together medical records scanned from 4/17/19 today in clinic.  He gives me verbal report that his last episode of paroxysmal atrial fibrillation was 9 years ago, he had one other episode a year from that as well.  Both episodes were symptomatic, he felt that he had any irregular heartbeat.  No recurrence since then.  He feels his atrial fibrillation was triggered in the setting of dehydration and extreme physical exertion.  There was no associated chest pain.  When he exercises at the gym these days he has no cardiopulmonary symptoms.  He says he saw his cardiologist about 6 months ago and he is only been told to remain aspirin, his cardiologist did not feel he needed stronger anticoagulation per patient.

## 2019-04-23 NOTE — ASSESSMENT & PLAN NOTE
He says he was seeing a urologist annually he wishes to transfer locally.  His brother had prostate cancer.  His last PSA was normal 1.21 on 3/11/19.  Patient says prior rectal exam there is some sort of irregularity of the prostate gland, he is not sure.  He denies having any lower urinary tract symptoms.  He has nocturia 0-1 times per night.  States stream is good.  He wishes to follow-up with urology.

## 2019-04-23 NOTE — ASSESSMENT & PLAN NOTE
The podiatrist was very helpful and the orthotics have relieved his foot discomfort and he is back to [facility].

## 2019-05-02 ENCOUNTER — OFFICE VISIT (OUTPATIENT)
Dept: DERMATOLOGY | Facility: IMAGING CENTER | Age: 69
End: 2019-05-02
Payer: MEDICARE

## 2019-05-02 ENCOUNTER — HOSPITAL ENCOUNTER (OUTPATIENT)
Dept: LAB | Facility: MEDICAL CENTER | Age: 69
End: 2019-05-02
Attending: DERMATOLOGY
Payer: MEDICARE

## 2019-05-02 VITALS — HEIGHT: 77 IN | BODY MASS INDEX: 27.63 KG/M2 | TEMPERATURE: 97.7 F | WEIGHT: 234 LBS

## 2019-05-02 DIAGNOSIS — D22.9 MULTIPLE NEVI: ICD-10-CM

## 2019-05-02 DIAGNOSIS — L57.0 ACTINIC KERATOSES: ICD-10-CM

## 2019-05-02 DIAGNOSIS — Z85.89 HISTORY OF SQUAMOUS CELL CARCINOMA: ICD-10-CM

## 2019-05-02 DIAGNOSIS — D48.5 NEOPLASM OF UNCERTAIN BEHAVIOR OF SKIN: ICD-10-CM

## 2019-05-02 DIAGNOSIS — Z85.828 HISTORY OF BASAL CELL CARCINOMA: ICD-10-CM

## 2019-05-02 DIAGNOSIS — L82.1 SEBORRHEIC KERATOSES: ICD-10-CM

## 2019-05-02 LAB — PATHOLOGY CONSULT NOTE: NORMAL

## 2019-05-02 PROCEDURE — 88305 TISSUE EXAM BY PATHOLOGIST: CPT

## 2019-05-02 PROCEDURE — 99203 OFFICE O/P NEW LOW 30 MIN: CPT | Mod: 25 | Performed by: DERMATOLOGY

## 2019-05-02 PROCEDURE — 17000 DESTRUCT PREMALG LESION: CPT | Mod: 59 | Performed by: DERMATOLOGY

## 2019-05-02 PROCEDURE — 17003 DESTRUCT PREMALG LES 2-14: CPT | Performed by: DERMATOLOGY

## 2019-05-02 PROCEDURE — 11102 TANGNTL BX SKIN SINGLE LES: CPT | Performed by: DERMATOLOGY

## 2019-05-02 ASSESSMENT — ENCOUNTER SYMPTOMS
FEVER: 0
CHILLS: 0

## 2019-05-02 NOTE — PROGRESS NOTES
Dermatology New Patient Visit    Chief Complaint   Patient presents with   • Establish Care   • Basal Cell Carcinoma       Subjective:     HPI:   Gopal Mallory Jr. is a 68 y.o. male presenting for    Full skin exam  Last exam 1 year ago   Recently has moved here from [place] 6 month ago     HPI: skin lesion   Location: right forearm   Time present: 6 months   Painful lesion: No  Itching lesion: Yes  Enlarging lesion: No  Anything make it better or worse?no     HPI: rough skin lesion  Location: right temple   Time present: few years   Painful lesion: No  Itching lesion: Yes  Enlarging lesion: No  Anything make it better or worse?  has been treated with liquid nitrogen - minimal improvement    History of skin cancer: Yes, Details: BCC 30 years ago hairline , over the last several years has had 5 BCC forehead, nose , face , SCC on back ( 7 years ago)   History of Aks/precancers: yes, several over the years treated with cryo and efudex (last field therapy years ago)  History of blistering/severe sunburns:Yes, Details: during youth  Family history of skin cancer:Yes, Details: father type unknown   Family history of atypical moles:No        Past Medical History:   Diagnosis Date   • Arrhythmia     Afib   • Cancer (HCC)     Basal cell/ Squamous skin   • Hypertension        Current Outpatient Prescriptions on File Prior to Visit   Medication Sig Dispense Refill   • Magnesium-Zinc (MAGNESIUM-CHELATED ZINC PO) Take  by mouth.     • valsartan (DIOVAN) 80 MG Tab Take 1 Tab by mouth every day. 30 Tab 3   • aspirin (ASA) 81 MG Chew Tab chewable tablet Take 1 Tab by mouth every day. 100 Tab 3   • Omega-3 Fatty Acids (FISH OIL) 1000 MG Cap capsule Take 1,000 mg by mouth 3 times a day, with meals.       No current facility-administered medications on file prior to visit.        Allergies   Allergen Reactions   • Molds & Smuts Unspecified     Sneezing       Family History   Problem Relation Age of Onset   • Cancer Mother     "    bladder non-smoker   • Dementia Father    • Cancer Brother         prostate age 52       Social History     Social History   • Marital status:      Spouse name: N/A   • Number of children: N/A   • Years of education: N/A     Occupational History   • Not on file.     Social History Main Topics   • Smoking status: Never Smoker   • Smokeless tobacco: Never Used   • Alcohol use Yes      Comment: 2 glasses wine/night   • Drug use: No   • Sexual activity: Yes     Partners: Female     Other Topics Concern   • Not on file     Social History Narrative   • No narrative on file       Review of Systems   Constitutional: Negative for chills and fever.   Skin: Negative for itching and rash.   All other systems reviewed and are negative.       Objective:     A full mucocutaneous exam was completed including: scalp, hair, ears, face, eyelids, conjunctiva, lips, gums/tongue/oropharynx, neck, chest, abdomen, back, left and right upper extremities (including hands/digits and fingernails), left and right lower extremities (including feet/toes, toenails), buttocks, excluding external genitalia (patient refusal) with the following pertinent findings listed below. Remaining above-listed examined areas within normal limits / negative for rashes or lesions.    Temp 36.5 °C (97.7 °F)   Ht 1.956 m (6' 5\")   Wt 106.1 kg (234 lb)     Physical Exam   Constitutional: He is oriented to person, place, and time and well-developed, well-nourished, and in no distress.   HENT:   Head: Normocephalic and atraumatic.       Right Ear: External ear normal.   Left Ear: External ear normal.   Nose: Nose normal.   Mouth/Throat: Oropharynx is clear and moist.   Eyes: Conjunctivae and lids are normal.   Neck: Normal range of motion. Neck supple.   Cardiovascular: Intact distal pulses.    Pulmonary/Chest: Effort normal.   Neurological: He is alert and oriented to person, place, and time.   Skin: Skin is warm and dry.        Psychiatric: Mood and " affect normal.   Vitals reviewed.      DATA: none applicable to review    Assessment and Plan:     1. Neoplasm of uncertain behavior of skin  Procedure Note   Procedure: Biopsy by shave technique  Location: as noted above  Size: as noted in exam  Preoperative diagnosis: collision lesion, r/o atypia  Risks, benefits and alternatives of procedure discussed and written informed consent obtained. Time out completed. Area of biopsy prepped with alcohol. Anesthesia with 1% lidocaine with epinephrine administered with 30 gauge needle. Shave biopsy of the site performed. Hemostasis achieved with pressure and aluminum chloride. Vaseline applied to wound with bandage. Patient tolerated procedure well and there were no complications. The specimen was sent to the pathology lab by the staff. Wound care was discussed.  - Pathology Specimen; Future    2. Actinic keratoses  CRYOTHERAPY:  Risks (including, but not limited to: hypo or hyperpigmentation, redness, blister, blood blister, recurrence, need for further treatment, infection, scar) and benefits of cryotherapy discussed. Patient verbally agreed to proceed with treatment. 2 cryotherapy freeze thaw cycles of 10 seconds were applied to 7 lesions on the face with cryac. Patient tolerated procedure well. Aftercare instructions given.  - discussed importance of yearly field therapy -- will plan for PDT in the early fall  I counseled the patient regarding the following:  The risks of PDT, including, but not limited to, pigmentary changes, pain, blistering, scabbing, redness, remote possibility of scarring, failed treatment, were discussed.   Post care discussed, including the absolute need to avoid sunlight for 2 days (48 hours) post-procedure, and the need to wear sun protection. Patient may experience sunburn like redness, discomfort, swelling, and scabbing. Must wear zinc oxide sunscreen at all times during the week of healing.  - no h/o HSV/cold sores on the lips  -patient  aware to call me with any questions or concerns.    3. History of basal cell carcinoma  Skin cancer education  - discussed importance of sun protective clothing, eyewear  - discussed importance of daily use of broad spectrum sunscreen with SPF 30 or greater, as well as need for reapplication ~every 2 hours when exposed to UVR  - discussed importance of regular self-exams, ideally once per month, every 6 months exams in clinic  - ABCDE's of melanoma discussed  - patient to bring any new or concerning lesions to my attention    4. History of squamous cell carcinoma  - skin cancer education/counseling as noted above    5. Multiple nevi  - Benign-appearing nature of lesions discussed. Advised to return to clinic for any new or concerning changes.    6. Seborrheic keratoses  - Benign-appearing nature of lesions discussed. Advised to return to clinic for any new or concerning changes.      Followup: Return for PDT, fall 2019.    Addis Snyder M.D.

## 2019-07-04 DIAGNOSIS — I10 ESSENTIAL HYPERTENSION: ICD-10-CM

## 2019-07-04 NOTE — TELEPHONE ENCOUNTER
Was the patient seen in the last year in this department? Yes  lov 4/22/19  Does patient have an active prescription for medications requested? No     Received Request Via: Pharmacy

## 2019-07-08 RX ORDER — VALSARTAN 80 MG/1
TABLET ORAL
Qty: 90 TAB | Refills: 1 | Status: SHIPPED | OUTPATIENT
Start: 2019-07-08 | End: 2019-10-02

## 2019-07-29 ENCOUNTER — APPOINTMENT (OUTPATIENT)
Dept: RADIOLOGY | Facility: MEDICAL CENTER | Age: 69
End: 2019-07-29
Attending: NURSE PRACTITIONER
Payer: MEDICARE

## 2019-08-12 ENCOUNTER — HOSPITAL ENCOUNTER (OUTPATIENT)
Dept: RADIOLOGY | Facility: MEDICAL CENTER | Age: 69
End: 2019-08-12
Attending: NURSE PRACTITIONER
Payer: MEDICARE

## 2019-08-12 DIAGNOSIS — R13.10 DYSPHAGIA, IDIOPATHIC: ICD-10-CM

## 2019-08-12 PROCEDURE — 74220 X-RAY XM ESOPHAGUS 1CNTRST: CPT

## 2019-08-12 PROCEDURE — 700101 HCHG RX REV CODE 250: Performed by: NURSE PRACTITIONER

## 2019-08-12 RX ADMIN — BARIUM SULFATE 700 MG: 700 TABLET ORAL at 13:15

## 2019-09-30 ENCOUNTER — PATIENT MESSAGE (OUTPATIENT)
Dept: DERMATOLOGY | Facility: IMAGING CENTER | Age: 69
End: 2019-09-30

## 2019-09-30 ENCOUNTER — TELEPHONE (OUTPATIENT)
Dept: DERMATOLOGY | Facility: IMAGING CENTER | Age: 69
End: 2019-09-30

## 2019-10-02 ENCOUNTER — PATIENT OUTREACH (OUTPATIENT)
Dept: HEALTH INFORMATION MANAGEMENT | Facility: OTHER | Age: 69
End: 2019-10-02

## 2019-10-02 ENCOUNTER — APPOINTMENT (OUTPATIENT)
Dept: RADIOLOGY | Facility: MEDICAL CENTER | Age: 69
End: 2019-10-02
Attending: HOSPITALIST
Payer: MEDICARE

## 2019-10-02 ENCOUNTER — APPOINTMENT (OUTPATIENT)
Dept: RADIOLOGY | Facility: MEDICAL CENTER | Age: 69
End: 2019-10-02
Attending: EMERGENCY MEDICINE
Payer: MEDICARE

## 2019-10-02 ENCOUNTER — HOSPITAL ENCOUNTER (OUTPATIENT)
Facility: MEDICAL CENTER | Age: 69
End: 2019-10-02
Attending: EMERGENCY MEDICINE | Admitting: HOSPITALIST
Payer: MEDICARE

## 2019-10-02 VITALS
HEART RATE: 80 BPM | RESPIRATION RATE: 18 BRPM | OXYGEN SATURATION: 98 % | TEMPERATURE: 97.5 F | BODY MASS INDEX: 27.7 KG/M2 | WEIGHT: 234.57 LBS | HEIGHT: 77 IN | DIASTOLIC BLOOD PRESSURE: 97 MMHG | SYSTOLIC BLOOD PRESSURE: 151 MMHG

## 2019-10-02 DIAGNOSIS — I48.91 ATRIAL FIBRILLATION, UNSPECIFIED TYPE (HCC): ICD-10-CM

## 2019-10-02 DIAGNOSIS — R55 NEAR SYNCOPE: ICD-10-CM

## 2019-10-02 DIAGNOSIS — M62.81 MUSCLE WEAKNESS OF LEFT UPPER EXTREMITY: ICD-10-CM

## 2019-10-02 PROBLEM — R74.8 LIVER ENZYME ELEVATION: Status: ACTIVE | Noted: 2019-10-02

## 2019-10-02 LAB
ALBUMIN SERPL BCP-MCNC: 4.5 G/DL (ref 3.2–4.9)
ALBUMIN/GLOB SERPL: 1.4 G/DL
ALP SERPL-CCNC: 98 U/L (ref 30–99)
ALT SERPL-CCNC: 75 U/L (ref 2–50)
ANION GAP SERPL CALC-SCNC: 15 MMOL/L (ref 0–11.9)
APTT PPP: 29.2 SEC (ref 24.7–36)
AST SERPL-CCNC: 35 U/L (ref 12–45)
BASOPHILS # BLD AUTO: 0.5 % (ref 0–1.8)
BASOPHILS # BLD: 0.03 K/UL (ref 0–0.12)
BILIRUB SERPL-MCNC: 0.8 MG/DL (ref 0.1–1.5)
BUN SERPL-MCNC: 16 MG/DL (ref 8–22)
CALCIUM SERPL-MCNC: 9.7 MG/DL (ref 8.4–10.2)
CHLORIDE SERPL-SCNC: 101 MMOL/L (ref 96–112)
CO2 SERPL-SCNC: 22 MMOL/L (ref 20–33)
CREAT SERPL-MCNC: 1.12 MG/DL (ref 0.5–1.4)
EKG IMPRESSION: NORMAL
EOSINOPHIL # BLD AUTO: 0.13 K/UL (ref 0–0.51)
EOSINOPHIL NFR BLD: 2.2 % (ref 0–6.9)
ERYTHROCYTE [DISTWIDTH] IN BLOOD BY AUTOMATED COUNT: 39.6 FL (ref 35.9–50)
GLOBULIN SER CALC-MCNC: 3.2 G/DL (ref 1.9–3.5)
GLUCOSE SERPL-MCNC: 138 MG/DL (ref 65–99)
HCT VFR BLD AUTO: 53.1 % (ref 42–52)
HGB BLD-MCNC: 17.9 G/DL (ref 14–18)
IMM GRANULOCYTES # BLD AUTO: 0.02 K/UL (ref 0–0.11)
IMM GRANULOCYTES NFR BLD AUTO: 0.3 % (ref 0–0.9)
INR PPP: 0.98 (ref 0.87–1.13)
LYMPHOCYTES # BLD AUTO: 1.76 K/UL (ref 1–4.8)
LYMPHOCYTES NFR BLD: 30.3 % (ref 22–41)
MAGNESIUM SERPL-MCNC: 1.9 MG/DL (ref 1.5–2.5)
MCH RBC QN AUTO: 30 PG (ref 27–33)
MCHC RBC AUTO-ENTMCNC: 33.7 G/DL (ref 33.7–35.3)
MCV RBC AUTO: 89.1 FL (ref 81.4–97.8)
MONOCYTES # BLD AUTO: 0.61 K/UL (ref 0–0.85)
MONOCYTES NFR BLD AUTO: 10.5 % (ref 0–13.4)
NEUTROPHILS # BLD AUTO: 3.25 K/UL (ref 1.82–7.42)
NEUTROPHILS NFR BLD: 56.2 % (ref 44–72)
NRBC # BLD AUTO: 0 K/UL
NRBC BLD-RTO: 0 /100 WBC
PHOSPHATE SERPL-MCNC: 1.8 MG/DL (ref 2.5–4.5)
PLATELET # BLD AUTO: 194 K/UL (ref 164–446)
PMV BLD AUTO: 9.3 FL (ref 9–12.9)
POTASSIUM SERPL-SCNC: 4.2 MMOL/L (ref 3.6–5.5)
PROT SERPL-MCNC: 7.7 G/DL (ref 6–8.2)
PROTHROMBIN TIME: 13.1 SEC (ref 12–14.6)
RBC # BLD AUTO: 5.96 M/UL (ref 4.7–6.1)
SODIUM SERPL-SCNC: 138 MMOL/L (ref 135–145)
TROPONIN T SERPL-MCNC: 9 NG/L (ref 6–19)
TSH SERPL DL<=0.005 MIU/L-ACNC: 3.9 UIU/ML (ref 0.38–5.33)
WBC # BLD AUTO: 5.8 K/UL (ref 4.8–10.8)

## 2019-10-02 PROCEDURE — 70551 MRI BRAIN STEM W/O DYE: CPT

## 2019-10-02 PROCEDURE — 85025 COMPLETE CBC W/AUTO DIFF WBC: CPT

## 2019-10-02 PROCEDURE — 93005 ELECTROCARDIOGRAM TRACING: CPT

## 2019-10-02 PROCEDURE — 84100 ASSAY OF PHOSPHORUS: CPT

## 2019-10-02 PROCEDURE — 700102 HCHG RX REV CODE 250 W/ 637 OVERRIDE(OP): Performed by: HOSPITALIST

## 2019-10-02 PROCEDURE — 770020 HCHG ROOM/CARE - TELE (206)

## 2019-10-02 PROCEDURE — 85730 THROMBOPLASTIN TIME PARTIAL: CPT

## 2019-10-02 PROCEDURE — 36415 COLL VENOUS BLD VENIPUNCTURE: CPT

## 2019-10-02 PROCEDURE — 71045 X-RAY EXAM CHEST 1 VIEW: CPT

## 2019-10-02 PROCEDURE — 85610 PROTHROMBIN TIME: CPT

## 2019-10-02 PROCEDURE — 84443 ASSAY THYROID STIM HORMONE: CPT

## 2019-10-02 PROCEDURE — 94760 N-INVAS EAR/PLS OXIMETRY 1: CPT

## 2019-10-02 PROCEDURE — 70450 CT HEAD/BRAIN W/O DYE: CPT

## 2019-10-02 PROCEDURE — 80053 COMPREHEN METABOLIC PANEL: CPT

## 2019-10-02 PROCEDURE — G0378 HOSPITAL OBSERVATION PER HR: HCPCS

## 2019-10-02 PROCEDURE — 99223 1ST HOSP IP/OBS HIGH 75: CPT | Mod: AI | Performed by: HOSPITALIST

## 2019-10-02 PROCEDURE — A9270 NON-COVERED ITEM OR SERVICE: HCPCS | Performed by: HOSPITALIST

## 2019-10-02 PROCEDURE — 83735 ASSAY OF MAGNESIUM: CPT

## 2019-10-02 PROCEDURE — 84484 ASSAY OF TROPONIN QUANT: CPT

## 2019-10-02 PROCEDURE — 99285 EMERGENCY DEPT VISIT HI MDM: CPT

## 2019-10-02 PROCEDURE — 93005 ELECTROCARDIOGRAM TRACING: CPT | Performed by: EMERGENCY MEDICINE

## 2019-10-02 RX ORDER — ASPIRIN 325 MG
325 TABLET ORAL DAILY
Status: DISCONTINUED | OUTPATIENT
Start: 2019-10-02 | End: 2019-10-02

## 2019-10-02 RX ORDER — ENALAPRILAT 1.25 MG/ML
1.25 INJECTION INTRAVENOUS EVERY 6 HOURS PRN
Status: DISCONTINUED | OUTPATIENT
Start: 2019-10-02 | End: 2019-10-02 | Stop reason: HOSPADM

## 2019-10-02 RX ORDER — VALSARTAN 80 MG/1
80 TABLET ORAL
Status: DISCONTINUED | OUTPATIENT
Start: 2019-10-02 | End: 2019-10-02 | Stop reason: HOSPADM

## 2019-10-02 RX ORDER — RANITIDINE 150 MG/1
150 TABLET ORAL EVERY MORNING
Status: DISCONTINUED | OUTPATIENT
Start: 2019-10-02 | End: 2019-10-02

## 2019-10-02 RX ORDER — ONDANSETRON 2 MG/ML
4 INJECTION INTRAMUSCULAR; INTRAVENOUS EVERY 4 HOURS PRN
Status: DISCONTINUED | OUTPATIENT
Start: 2019-10-02 | End: 2019-10-02 | Stop reason: HOSPADM

## 2019-10-02 RX ORDER — FAMOTIDINE 20 MG/1
20 TABLET, FILM COATED ORAL EVERY MORNING
Status: DISCONTINUED | OUTPATIENT
Start: 2019-10-03 | End: 2019-10-02 | Stop reason: HOSPADM

## 2019-10-02 RX ORDER — ONDANSETRON 4 MG/1
4 TABLET, ORALLY DISINTEGRATING ORAL EVERY 4 HOURS PRN
Status: DISCONTINUED | OUTPATIENT
Start: 2019-10-02 | End: 2019-10-02 | Stop reason: HOSPADM

## 2019-10-02 RX ORDER — ACETAMINOPHEN 325 MG/1
325 TABLET ORAL EVERY 4 HOURS PRN
COMMUNITY
End: 2021-12-02

## 2019-10-02 RX ORDER — FLUTICASONE PROPIONATE 50 MCG
1 SPRAY, SUSPENSION (ML) NASAL
Status: DISCONTINUED | OUTPATIENT
Start: 2019-10-02 | End: 2019-10-02 | Stop reason: HOSPADM

## 2019-10-02 RX ORDER — ACETAMINOPHEN 325 MG/1
650 TABLET ORAL EVERY 6 HOURS PRN
Status: DISCONTINUED | OUTPATIENT
Start: 2019-10-02 | End: 2019-10-02 | Stop reason: HOSPADM

## 2019-10-02 RX ORDER — VALSARTAN 80 MG/1
80 TABLET ORAL EVERY MORNING
COMMUNITY
End: 2019-12-11

## 2019-10-02 RX ORDER — FLUTICASONE PROPIONATE 50 MCG
1 SPRAY, SUSPENSION (ML) NASAL
COMMUNITY

## 2019-10-02 RX ADMIN — METOPROLOL TARTRATE 25 MG: 25 TABLET ORAL at 13:51

## 2019-10-02 ASSESSMENT — COGNITIVE AND FUNCTIONAL STATUS - GENERAL
DAILY ACTIVITIY SCORE: 24
SUGGESTED CMS G CODE MODIFIER DAILY ACTIVITY: CH
MOBILITY SCORE: 24
SUGGESTED CMS G CODE MODIFIER MOBILITY: CH

## 2019-10-02 ASSESSMENT — ENCOUNTER SYMPTOMS
DEPRESSION: 0
FEVER: 0
SORE THROAT: 0
PALPITATIONS: 1
VOMITING: 0
BACK PAIN: 0
ABDOMINAL PAIN: 0
NAUSEA: 0
EYE PAIN: 0
DIZZINESS: 1
NECK PAIN: 0
INSOMNIA: 0
COUGH: 0
TINGLING: 0
HEADACHES: 0
CHILLS: 0
BLURRED VISION: 0
SHORTNESS OF BREATH: 0

## 2019-10-02 ASSESSMENT — CHA2DS2 SCORE
HYPERTENSION: YES
AGE 65 TO 74: YES
DIABETES: NO
CHA2DS2 VASC SCORE: 2
CHF OR LEFT VENTRICULAR DYSFUNCTION: NO
SEX: MALE
PRIOR STROKE OR TIA OR THROMBOEMBOLISM: NO
AGE 75 OR GREATER: NO
VASCULAR DISEASE: NO

## 2019-10-02 ASSESSMENT — PATIENT HEALTH QUESTIONNAIRE - PHQ9
1. LITTLE INTEREST OR PLEASURE IN DOING THINGS: NOT AT ALL
SUM OF ALL RESPONSES TO PHQ9 QUESTIONS 1 AND 2: 0
2. FEELING DOWN, DEPRESSED, IRRITABLE, OR HOPELESS: NOT AT ALL

## 2019-10-02 ASSESSMENT — LIFESTYLE VARIABLES
TOTAL SCORE: 0
HOW MANY TIMES IN THE PAST YEAR HAVE YOU HAD 5 OR MORE DRINKS IN A DAY: 3
AVERAGE NUMBER OF DAYS PER WEEK YOU HAVE A DRINK CONTAINING ALCOHOL: 5
ALCOHOL_USE: YES
EVER FELT BAD OR GUILTY ABOUT YOUR DRINKING: NO
HAVE PEOPLE ANNOYED YOU BY CRITICIZING YOUR DRINKING: NO
EVER_SMOKED: NEVER
TOTAL SCORE: 0
HAVE YOU EVER FELT YOU SHOULD CUT DOWN ON YOUR DRINKING: NO
CONSUMPTION TOTAL: POSITIVE
EVER HAD A DRINK FIRST THING IN THE MORNING TO STEADY YOUR NERVES TO GET RID OF A HANGOVER: NO
TOTAL SCORE: 0
ON A TYPICAL DAY WHEN YOU DRINK ALCOHOL HOW MANY DRINKS DO YOU HAVE: 2

## 2019-10-02 NOTE — PROGRESS NOTES
Telemetry Shift Summary    Rhythm Afib  HR Range 40s-110s  Ectopy Occ PVCs  Measurements -/.10/-        Normal Values  Rhythm SR  HR Range    Measurements 0.12-0.20 / 0.06-0.10  / 0.30-0.52

## 2019-10-02 NOTE — ED TRIAGE NOTES
"Chief Complaint   Patient presents with   • Palpitations     Pt states he has felt his heart is \"fluttering\" and feels like it is \"irregular and going fast\"; pt state he has a hx of going into afib in the past     Pt denies any chest pain or sob. Ambulating independently with steady gait. NADN.   "

## 2019-10-02 NOTE — PROGRESS NOTES
· 2 RN skin check complete  · Devices in place : IVs, Tele monitor  · Skin assessed under devices: yes  · Confirmed pressure ulcers found on: N/A  · New potential pressure ulcers noted on: N/A.   · Skin intact      The following interventions in place:       Pt can turn self independently.

## 2019-10-02 NOTE — H&P
Hospital Medicine History & Physical Note    Date of Service  10/2/2019    Primary Care Physician  Radha Culp M.D.    Consultants  none- I did call and discuss him with cardiology. He will be scheduled for an appointment.     Code Status  full    Chief Complaint  Palpitations and fatigue.     History of Presenting Illness  69 y.o. male who presented 10/2/2019 with palpitations and fatigue for the last several days. He has a known history of parixysmal atrial fibrillation in [facility]. He usually has symptoms like this when he gets it. It is often brought on by exercise that is vigorous or dehydration. He denies chest pain even with exercise. He denies weakness or numbness. His wife has noted that he has his left arm hangs differently when he is walking. He has not really noted it and denies any sensation of pain or discomfort. He exercises regularly without difficulty.      Review of Systems  Review of Systems   Constitutional: Negative for chills and fever.   HENT: Negative for sore throat.    Eyes: Negative for blurred vision and pain.   Respiratory: Negative for cough and shortness of breath.    Cardiovascular: Positive for palpitations. Negative for chest pain.   Gastrointestinal: Negative for abdominal pain, nausea and vomiting.   Genitourinary: Negative for dysuria and urgency.   Musculoskeletal: Negative for back pain and neck pain.   Skin: Negative for itching and rash.   Neurological: Positive for dizziness. Negative for tingling and headaches.   Psychiatric/Behavioral: Negative for depression. The patient does not have insomnia.    All other systems reviewed and are negative.      Past Medical History   has a past medical history of Arrhythmia, Cancer (HCC), and Hypertension.    Surgical History   has a past surgical history that includes other.     Family History  family history includes Cancer in his brother and mother; Dementia in his father.     Social History   reports that he has never smoked.  He has never used smokeless tobacco. He reports that he drinks alcohol. He reports that he does not use drugs.    Allergies  Allergies   Allergen Reactions   • Molds & Smuts Unspecified     Sneezing       Medications  Prior to Admission Medications   Prescriptions Last Dose Informant Patient Reported? Taking?   Multiple Vitamins-Minerals (MULTIVITAMIN PO) 10/2/2019 at 0630 Patient Yes Yes   Sig: Take 1 Tab by mouth every morning.   Omega-3 Fatty Acids (FISH OIL) 1000 MG Cap capsule 10/1/2019 at 0630 Patient Yes No   Sig: Take 1,000 mg by mouth 3 times a day, with meals.   Thiamine HCl (VITAMIN B1 PO) 10/2/2019 at 0630 Patient Yes Yes   Sig: Take 1 Tab by mouth every morning.   acetaminophen (TYLENOL) 325 MG Tab 9/27/2019 at AM Patient Yes Yes   Sig: Take 325 mg by mouth every four hours as needed for Moderate Pain.   aspirin (ASA) 81 MG Chew Tab chewable tablet 10/2/2019 at 0630 Patient No No   Sig: Take 1 Tab by mouth every day.   fluticasone (FLONASE) 50 MCG/ACT nasal spray LAST WEEK at [facility] Patient Yes Yes   Sig: Spray 1 Spray in nose 1 time daily as needed (ALLERGY).   raNITIdine HCl (ZANTAC PO) 10/2/2019 at 0630 Patient Yes Yes   Sig: Take 1 Tab by mouth every morning.   valsartan (DIOVAN) 80 MG Tab 10/2/2019 at 0630 Patient Yes Yes   Sig: Take 80 mg by mouth every morning.      Facility-Administered Medications: None       Physical Exam  Temp:  [36.1 °C (96.9 °F)] 36.1 °C (96.9 °F)  Pulse:  [65-95] 83  Resp:  [15-19] 18  BP: (109-147)/() 134/89  SpO2:  [94 %-98 %] 97 %    Physical Exam   Constitutional: He is oriented to person, place, and time. He appears well-developed and well-nourished. No distress.   Patient seen and examined  Plan discussed with NERY WAGONER:   Right Ear: External ear normal.   Left Ear: External ear normal.   Nose: Nose normal.   Eyes: Right eye exhibits no discharge. Left eye exhibits no discharge. No scleral icterus.   Neck: No JVD present. No tracheal deviation present.    Cardiovascular: Normal rate, normal heart sounds and intact distal pulses.   No murmur heard.  Cap refill 2sec  Pulses 2+ throughout  irregular   Pulmonary/Chest: Effort normal and breath sounds normal. No respiratory distress. He has no wheezes. He has no rales.   Abdominal: Soft. Bowel sounds are normal. He exhibits no distension. There is no tenderness. There is no guarding.   Musculoskeletal: He exhibits no edema or tenderness.   Neurological: He is alert and oriented to person, place, and time.   Skin: Skin is warm and dry. He is not diaphoretic. No erythema.   Normal skin color   Psychiatric: He has a normal mood and affect. His behavior is normal.   Nursing note and vitals reviewed.      Laboratory:  Recent Labs     10/02/19  0800   WBC 5.8   RBC 5.96   HEMOGLOBIN 17.9   HEMATOCRIT 53.1*   MCV 89.1   MCH 30.0   MCHC 33.7   RDW 39.6   PLATELETCT 194   MPV 9.3     Recent Labs     10/02/19  0800   SODIUM 138   POTASSIUM 4.2   CHLORIDE 101   CO2 22   GLUCOSE 138*   BUN 16   CREATININE 1.12   CALCIUM 9.7     Recent Labs     10/02/19  0800   ALTSGPT 75*   ASTSGOT 35   ALKPHOSPHAT 98   TBILIRUBIN 0.8   GLUCOSE 138*     Recent Labs     10/02/19  0800   APTT 29.2   INR 0.98     No results for input(s): NTPROBNP in the last 72 hours.      Recent Labs     10/02/19  0800   TROPONINT 9       Urinalysis:    No results found     Imaging:  CT-HEAD W/O   Final Result      No acute intracranial hemorrhage is identified.      Mild white matter hypodensity is present.  This is a nonspecific finding which usually is found to represent chronic microvascular disease in patient's of this demographic.  Demyelination, age indeterminant ischemia and gliosis are also common    possibilities.      Mild sphenoid sinus inflammatory disease      DX-CHEST-PORTABLE (1 VIEW)   Final Result      Left basilar opacity is likely from atelectasis, favored over consolidation      MR-BRAIN-W/O    (Results Pending)         Assessment/Plan:  I  anticipate this patient is appropriate for observation status at this time.    * Paroxysmal atrial fibrillation (HCC)- (present on admission)  Assessment & Plan  Rate controlled. He has seen cardiology in the past when he lived in the [place] area. We have requested records but I do not see them in the record. Now with intermittent RVR. He usually converts with what he thinks is one or 2 doses of metoprolol. I will try this today and monitor him on tele. He may not tolerate it long term as his resting heart rate is around 50. Start eliquis and stop asa and fish oil as his CHADS-vasc is 2. He has a little left arm abnormal movement per his wife. Check MRI of the brain to assure he does not have an occult CVA. I cannot find any evidence on exam. He had an echo in april that I reviewed and is normal. No need for a new one. He likely wound benefit from a stress test in the future but has no evidence for ischemia here and has an excellent exercise tolerance.      Liver enzyme elevation  Assessment & Plan  Mild alt elevation. No symptoms. Will trend.     Impaired fasting glucose- (present on admission)  Assessment & Plan  Recheck a1c last in march was 5.9    Essential hypertension- (present on admission)  Assessment & Plan  Continue diovan. Pressures at home are 120-150      VTE prophylaxis: scd

## 2019-10-02 NOTE — PROGRESS NOTES
Discharge order written. IV removed, patient tolerated well. Tele removed and returned to monitor tech. Belongings gathered. Pt states that all personal belongings are in possession. AVS printed, reviewed and copy signed and placed on the chart. Patient has no further questions. Prescriptions sent to [facility]. Discharged in satisfactory condition home with wife. Pt off unit via walking, declined staff escort.

## 2019-10-02 NOTE — PROGRESS NOTES
Pt arrives to unit from ER to unit via gurney. Ambulated from gurney to bed, accompanied by staff. Pt A&Ox4, No c/o pain. Tele monitor applied, vitals taken. History, allergies, and med rec reviewed and admission profile completed.     Pt oriented to unit and plan of care discussed, including tests, medication, tele. Welcome folder provided and discussed. Communication board filled out. Pt instructed on call light usage and encouraged to call for any questions, needs, or concerns and prior to getting out of bed. Fall precautions in place. Pt agrees with the plan and declines any needs at this time. Bed locked and low.

## 2019-10-02 NOTE — DISCHARGE INSTRUCTIONS
Discharge Instructions per Marc Benjamin M.D.        DIET: regular    ACTIVITY: as tolerated.     DIAGNOSIS: atrial fibrillation    Return to ER if you have chest pain, pass out, shortness of breath, palpitations or a rapid heart rate. Otherwise for any concern.       Atrial Fibrillation  Atrial fibrillation is a type of irregular or rapid heartbeat (arrhythmia). In atrial fibrillation, the heart quivers continuously in a chaotic pattern. This occurs when parts of the heart receive disorganized signals that make the heart unable to pump blood normally. This can increase the risk for stroke, heart failure, and other heart-related conditions. There are different types of atrial fibrillation, including:  · Paroxysmal atrial fibrillation. This type starts suddenly, and it usually stops on its own shortly after it starts.  · Persistent atrial fibrillation. This type often lasts longer than a week. It may stop on its own or with treatment.  · Long-lasting persistent atrial fibrillation. This type lasts longer than 12 months.  · Permanent atrial fibrillation. This type does not go away.  Talk with your health care provider to learn about the type of atrial fibrillation that you have.  What are the causes?  This condition is caused by some heart-related conditions or procedures, including:  · A heart attack.  · Coronary artery disease.  · Heart failure.  · Heart valve conditions.  · High blood pressure.  · Inflammation of the sac that surrounds the heart (pericarditis).  · Heart surgery.  · Certain heart rhythm disorders, such as Birch-Parkinson-White syndrome.  Other causes include:  · Pneumonia.  · Obstructive sleep apnea.  · Blockage of an artery in the lungs (pulmonary embolism, or PE).  · Lung cancer.  · Chronic lung disease.  · Thyroid problems, especially if the thyroid is overactive (hyperthyroidism).  · Caffeine.  · Excessive alcohol use or illegal drug use.  · Use of some medicines, including certain  decongestants and diet pills.  Sometimes, the cause cannot be found.  What increases the risk?  This condition is more likely to develop in:  · People who are older in age.  · People who smoke.  · People who have diabetes mellitus.  · People who are overweight (obese).  · Athletes who exercise vigorously.  What are the signs or symptoms?  Symptoms of this condition include:  · A feeling that your heart is beating rapidly or irregularly.  · A feeling of discomfort or pain in your chest.  · Shortness of breath.  · Sudden light-headedness or weakness.  · Getting tired easily during exercise.  In some cases, there are no symptoms.  How is this diagnosed?  Your health care provider may be able to detect atrial fibrillation when taking your pulse. If detected, this condition may be diagnosed with:  · An electrocardiogram (ECG).  · A Holter monitor test that records your heartbeat patterns over a 24-hour period.  · Transthoracic echocardiogram (TTE) to evaluate how blood flows through your heart.  · Transesophageal echocardiogram (RAMON) to view more detailed images of your heart.  · A stress test.  · Imaging tests, such as a CT scan or chest X-ray.  · Blood tests.  How is this treated?  The main goals of treatment are to prevent blood clots from forming and to keep your heart beating at a normal rate and rhythm. The type of treatment that you receive depends on many factors, such as your underlying medical conditions and how you feel when you are experiencing atrial fibrillation.  This condition may be treated with:  · Medicine to slow down the heart rate, bring the heart’s rhythm back to normal, or prevent clots from forming.  · Electrical cardioversion. This is a procedure that resets your heart’s rhythm by delivering a controlled, low-energy shock to the heart through your skin.  · Different types of ablation, such as catheter ablation, catheter ablation with pacemaker, or surgical ablation. These procedures destroy the  heart tissues that send abnormal signals. When the pacemaker is used, it is placed under your skin to help your heart beat in a regular rhythm.  Follow these instructions at home:  · Take over-the counter and prescription medicines only as told by your health care provider.  · If your health care provider prescribed a blood-thinning medicine (anticoagulant), take it exactly as told. Taking too much blood-thinning medicine can cause bleeding. If you do not take enough blood-thinning medicine, you will not have the protection that you need against stroke and other problems.  · Do not use tobacco products, including cigarettes, chewing tobacco, and e-cigarettes. If you need help quitting, ask your health care provider.  · If you have obstructive sleep apnea, manage your condition as told by your health care provider.  · Do not drink alcohol.  · Do not drink beverages that contain caffeine, such as coffee, soda, and tea.  · Maintain a healthy weight. Do not use diet pills unless your health care provider approves. Diet pills may make heart problems worse.  · Follow diet instructions as told by your health care provider.  · Exercise regularly as told by your health care provider.  · Keep all follow-up visits as told by your health care provider. This is important.  How is this prevented?  · Avoid drinking beverages that contain caffeine or alcohol.  · Avoid certain medicines, especially medicines that are used for breathing problems.  · Avoid certain herbs and herbal medicines, such as those that contain ephedra or ginseng.  · Do not use illegal drugs, such as cocaine and amphetamines.  · Do not smoke.  · Manage your high blood pressure.  Contact a health care provider if:  · You notice a change in the rate, rhythm, or strength of your heartbeat.  · You are taking an anticoagulant and you notice increased bruising.  · You tire more easily when you exercise or exert yourself.  Get help right away if:  · You have chest  pain, abdominal pain, sweating, or weakness.  · You feel nauseous.  · You notice blood in your vomit, bowel movement, or urine.  · You have shortness of breath.  · You suddenly have swollen feet and ankles.  · You feel dizzy.  · You have sudden weakness or numbness of the face, arm, or leg, especially on one side of the body.  · You have trouble speaking, trouble understanding, or both (aphasia).  · Your face or your eyelid droops on one side.  These symptoms may represent a serious problem that is an emergency. Do not wait to see if the symptoms will go away. Get medical help right away. Call your local emergency services (911 in the [place] Do not drive yourself to the hospital.   This information is not intended to replace advice given to you by your health care provider. Make sure you discuss any questions you have with your health care provider.  Document Released: 12/18/2006 Document Revised: 04/26/2017 Document Reviewed: 04/13/2016  [employer] Interactive Patient Education © 2017 [employer]      Apixaban oral tablets  What is this medicine?  APIXABAN (a PIX a ban) is an anticoagulant (blood thinner). It is used to lower the chance of stroke in people with a medical condition called atrial fibrillation. It is also used to treat or prevent blood clots in the lungs or in the veins.  This medicine may be used for other purposes; ask your health care provider or pharmacist if you have questions.  COMMON BRAND NAME(S): Starrdenise  What should I tell my health care provider before I take this medicine?  They need to know if you have any of these conditions:  -bleeding disorders  -bleeding in the brain  -blood in your stools (black or tarry stools) or if you have blood in your vomit  -history of stomach bleeding  -kidney disease  -liver disease  -mechanical heart valve  -an unusual or allergic reaction to apixaban, other medicines, foods, dyes, or preservatives  -pregnant or trying to get pregnant  -breast-feeding  How  should I use this medicine?  Take this medicine by mouth with a glass of water. Follow the directions on the prescription label. You can take it with or without food. If it upsets your stomach, take it with food. Take your medicine at regular intervals. Do not take it more often than directed. Do not stop taking except on your doctor's advice. Stopping this medicine may increase your risk of a blot clot. Be sure to refill your prescription before you run out of medicine.  Talk to your pediatrician regarding the use of this medicine in children. Special care may be needed.  Overdosage: If you think you have taken too much of this medicine contact a poison control center or emergency room at once.  NOTE: This medicine is only for you. Do not share this medicine with others.  What if I miss a dose?  If you miss a dose, take it as soon as you can. If it is almost time for your next dose, take only that dose. Do not take double or extra doses.  What may interact with this medicine?  This medicine may interact with the following:  -aspirin and aspirin-like medicines  -certain medicines for fungal infections like ketoconazole and itraconazole  -certain medicines for seizures like carbamazepine and phenytoin  -certain medicines that treat or prevent blood clots like warfarin, enoxaparin, and dalteparin  -clarithromycin  -NSAIDs, medicines for pain and inflammation, like ibuprofen or naproxen  -rifampin  -ritonavir  -Primrose's wort  This list may not describe all possible interactions. Give your health care provider a list of all the medicines, herbs, non-prescription drugs, or dietary supplements you use. Also tell them if you smoke, drink alcohol, or use illegal drugs. Some items may interact with your medicine.  What should I watch for while using this medicine?  Visit your doctor or health care professional for regular checks on your progress.  Notify your doctor or health care professional and seek emergency treatment  if you develop breathing problems; changes in vision; chest pain; severe, sudden headache; pain, swelling, warmth in the leg; trouble speaking; sudden numbness or weakness of the face, arm or leg. These can be signs that your condition has gotten worse.  If you are going to have surgery or other procedure, tell your doctor that you are taking this medicine.  What side effects may I notice from receiving this medicine?  Side effects that you should report to your doctor or health care professional as soon as possible:  -allergic reactions like skin rash, itching or hives, swelling of the face, lips, or tongue  -signs and symptoms of bleeding such as bloody or black, tarry stools; red or dark-brown urine; spitting up blood or brown material that looks like coffee grounds; red spots on the skin; unusual bruising or bleeding from the eye, gums, or nose  This list may not describe all possible side effects. Call your doctor for medical advice about side effects. You may report side effects to FDA at 4-297-NDO-7413.  Where should I keep my medicine?  Keep out of the reach of children.  Store at room temperature between 20 and 25 degrees C (68 and 77 degrees F). Throw away any unused medicine after the expiration date.  NOTE: This sheet is a summary. It may not cover all possible information. If you have questions about this medicine, talk to your doctor, pharmacist, or health care provider.  © 2018 [employer]vier/[employer] (2017-07-10 11:54:23)    Metoprolol tablets  What is this medicine?  METOPROLOL (me TOE proe lole) is a beta-blocker. Beta-blockers reduce the workload on the heart and help it to beat more regularly. This medicine is used to treat high blood pressure and to prevent chest pain. It is also used to after a heart attack and to prevent an additional heart attack from occurring.  This medicine may be used for other purposes; ask your health care provider or pharmacist if you have questions.  COMMON BRAND  NAME(S): Lopressor  What should I tell my health care provider before I take this medicine?  They need to know if you have any of these conditions:  -diabetes  -heart or vessel disease like slow heart rate, worsening heart failure, heart block, sick sinus syndrome or Raynaud's disease  -kidney disease  -liver disease  -lung or breathing disease, like asthma or emphysema  -pheochromocytoma  -thyroid disease  -an unusual or allergic reaction to metoprolol, other beta-blockers, medicines, foods, dyes, or preservatives  -pregnant or trying to get pregnant  -breast-feeding  How should I use this medicine?  Take this medicine by mouth with a drink of water. Follow the directions on the prescription label. Take this medicine immediately after meals. Take your doses at regular intervals. Do not take more medicine than directed. Do not stop taking this medicine suddenly. This could lead to serious heart-related effects.  Talk to your pediatrician regarding the use of this medicine in children. Special care may be needed.  Overdosage: If you think you have taken too much of this medicine contact a poison control center or emergency room at once.  NOTE: This medicine is only for you. Do not share this medicine with others.  What if I miss a dose?  If you miss a dose, take it as soon as you can. If it is almost time for your next dose, take only that dose. Do not take double or extra doses.  What may interact with this medicine?  This medicine may interact with the following medications:  -certain medicines for blood pressure, heart disease, irregular heart beat  -certain medicines for depression like monoamine oxidase (MAO) inhibitors, fluoxetine, or paroxetine  -clonidine  -dobutamine  -epinephrine  -isoproterenol  -reserpine  This list may not describe all possible interactions. Give your health care provider a list of all the medicines, herbs, non-prescription drugs, or dietary supplements you use. Also tell them if you  smoke, drink alcohol, or use illegal drugs. Some items may interact with your medicine.  What should I watch for while using this medicine?  Visit your doctor or health care professional for regular check ups. Contact your doctor right away if your symptoms worsen. Check your blood pressure and pulse rate regularly. Ask your health care professional what your blood pressure and pulse rate should be, and when you should contact them.  You may get drowsy or dizzy. Do not drive, use machinery, or do anything that needs mental alertness until you know how this medicine affects you. Do not sit or stand up quickly, especially if you are an older patient. This reduces the risk of dizzy or fainting spells. Contact your doctor if these symptoms continue. Alcohol may interfere with the effect of this medicine. Avoid alcoholic drinks.  What side effects may I notice from receiving this medicine?  Side effects that you should report to your doctor or health care professional as soon as possible:  -allergic reactions like skin rash, itching or hives  -cold or numb hands or feet  -depression  -difficulty breathing  -faint  -fever with sore throat  -irregular heartbeat, chest pain  -rapid weight gain  -swollen legs or ankles  Side effects that usually do not require medical attention (report to your doctor or health care professional if they continue or are bothersome):  -anxiety or nervousness  -change in sex drive or performance  -dry skin  -headache  -nightmares or trouble sleeping  -short term memory loss  -stomach upset or diarrhea  -unusually tired  This list may not describe all possible side effects. Call your doctor for medical advice about side effects. You may report side effects to FDA at 8-160-APE-3680.  Where should I keep my medicine?  Keep out of the reach of children.  Store at room temperature between 15 and 30 degrees C (59 and 86 degrees F). Throw away any unused medicine after the expiration date.  NOTE: This  sheet is a summary. It may not cover all possible information. If you have questions about this medicine, talk to your doctor, pharmacist, or health care provider.  © 2018 [employer] Standard (2014-08-22 14:40:36)    Discharge Instructions    Discharged to home by car with relative. Discharged via walking, hospital escort: Refused.  Special equipment needed: Not Applicable    Be sure to schedule a follow-up appointment with your primary care doctor or any specialists as instructed.     Discharge Plan:   Diet Plan: Discussed  Activity Level: Discussed  Confirmed Follow up Appointment: Appointment Scheduled  Confirmed Symptoms Management: Discussed  Medication Reconciliation Updated: Yes  Influenza Vaccine Indication: Not indicated: Previously immunized this influenza season and > 8 years of age    I understand that a diet low in cholesterol, fat, and sodium is recommended for good health. Unless I have been given specific instructions below for another diet, I accept this instruction as my diet prescription.   Other diet: regular/heart healthy    Special Instructions: None    · Is patient discharged on Warfarin / Coumadin?   No     Depression / Suicide Risk    As you are discharged from this [facility] facility, it is important to learn how to keep safe from harming yourself.    Recognize the warning signs:  · Abrupt changes in personality, positive or negative- including increase in energy   · Giving away possessions  · Change in eating patterns- significant weight changes-  positive or negative  · Change in sleeping patterns- unable to sleep or sleeping all the time   · Unwillingness or inability to communicate  · Depression  · Unusual sadness, discouragement and loneliness  · Talk of wanting to die  · Neglect of personal appearance   · Rebelliousness- reckless behavior  · Withdrawal from people/activities they love  · Confusion- inability to concentrate     If you or a loved one observes any of these  behaviors or has concerns about self-harm, here's what you can do:  · Talk about it- your feelings and reasons for harming yourself  · Remove any means that you might use to hurt yourself (examples: pills, rope, extension cords, firearm)  · Get professional help from the community (Mental Health, Substance Abuse, psychological counseling)  · Do not be alone:Call your Safe Contact- someone whom you trust who will be there for you.  · Call your local CRISIS HOTLINE 249-5601 or 228-209-3738  · Call your local Children's Mobile Crisis Response Team [place] (102) 537-5828 or www.[employer]  · Call the toll free National Suicide Prevention Hotlines   · National Suicide Prevention Lifeline 831-606-EXWV (7230)  · National Hope Line Network 800-SUICIDE (240-0566)

## 2019-10-02 NOTE — ASSESSMENT & PLAN NOTE
Rate controlled. He has seen cardiology in the past when he lived in the [place] area. We have requested records but I do not see them in the record. Now with intermittent RVR. He usually converts with what he thinks is one or 2 doses of metoprolol. I will try this today and monitor him on tele. He may not tolerate it long term as his resting heart rate is around 50. Start eliquis and stop asa and fish oil as his CHADS-vasc is 2. He has a little left arm abnormal movement per his wife. Check MRI of the brain to assure he does not have an occult CVA. I cannot find any evidence on exam. He had an echo in april that I reviewed and is normal. No need for a new one. He likely wound benefit from a stress test in the future but has no evidence for ischemia here and has an excellent exercise tolerance.

## 2019-10-02 NOTE — DISCHARGE PLANNING
LSW placed call to pts pharmacy, pts Xarelto is $[phone]. LSW will speak with pt. LSW will check cost of Eliquis.     LSW placed call to pts pharmacy, Rx Eliquis is $39. LSW updated bedside RN.

## 2019-10-02 NOTE — ED PROVIDER NOTES
"ED Provider Note    CHIEF COMPLAINT  Chief Complaint   Patient presents with   • Palpitations     Pt states he has felt his heart is \"fluttering\" and feels like it is \"irregular and going fast\"; pt state he has a hx of going into afib in the past       HPI  Gopal Mallory Jr. is a 69 y.o. male who presents to the emergency department through triage for palpitations.  Patient states he was working out at the gym on Monday when he developed some palpitations, noticed it persisted throughout the day, and since that time, his heart rate has been elevated.  Patient states baseline in the 50s, now heart rate 70s to 90s.  Patient feels generally weak and fatigued.  He feels lightheaded but has not had a syncopal episode.  He feels \"foggy.\"  Denies chest pain although describes some shortness of breath, worse with exertion.  No extremity edema.  Patient has felt some heaviness in his left upper extremity and states he has been noted by himself and his wife to have had some few involuntary movements of this extremity, but denies focal weakness or paresthesias.  Denies change in speech or vision.  Denies headache.    Distant history of atrial fibrillation \"they gave me a couple pills and to me to get some sleep and then it went away.\"  No persistent medication for this.  History of hypertension, compliant with this medication.    REVIEW OF SYSTEMS  See HPI for further details. All other systems are negative.     PAST MEDICAL HISTORY   has a past medical history of Arrhythmia, Cancer (HCC), and Hypertension.    SOCIAL HISTORY  Social History     Tobacco Use   • Smoking status: Never Smoker   • Smokeless tobacco: Never Used   Substance and Sexual Activity   • Alcohol use: Yes     Comment: 2 glasses wine/night   • Drug use: No   • Sexual activity: Yes     Partners: Female       SURGICAL HISTORY   has a past surgical history that includes other.    CURRENT MEDICATIONS  Home Medications     Reviewed by Coby Barajas, " "PhT ([employer]) on 10/02/19 at 0811  Med List Status: Complete   Medication Last Dose Status   acetaminophen (TYLENOL) 325 MG Tab 9/27/2019 Active   aspirin (ASA) 81 MG Chew Tab chewable tablet 10/2/2019 Active   fluticasone (FLONASE) 50 MCG/ACT nasal spray LAST WEEK Active   Multiple Vitamins-Minerals (MULTIVITAMIN PO) 10/2/2019 Active   Omega-3 Fatty Acids (FISH OIL) 1000 MG Cap capsule 10/1/2019 Active   raNITIdine HCl (ZANTAC PO) 10/2/2019 Active   Thiamine HCl (VITAMIN B1 PO) 10/2/2019 Active   valsartan (DIOVAN) 80 MG Tab 10/2/2019 Active                ALLERGIES  Allergies   Allergen Reactions   • Molds & Smuts Unspecified     Sneezing       PHYSICAL EXAM  VITAL SIGNS: /89   Pulse 83   Temp 36.1 °C (96.9 °F) (Temporal)   Resp 18   Ht 1.956 m (6' 5\")   Wt 106.4 kg (234 lb 9.1 oz)   SpO2 97%   BMI 27.82 kg/m²   Pulse ox interpretation: I interpret this pulse ox as normal.  Constitutional: Alert in no apparent distress.  HENT: Normocephalic, atraumatic. Bilateral external ears normal, Nose normal. Moist mucous membranes.    Eyes: Pupils are equal and reactive, Conjunctiva normal.  EOMI.  No nystagmus per  Neck: Normal range of motion, Supple.  No JVD.  Lymphatic: No lymphadenopathy noted.   Cardiovascular: Irregularly irregular rate and rhythm, no murmurs. Distal pulses intact.  No peripheral edema.  Thorax & Lungs: Normal breath sounds.  No wheezing/rales/ronchi. No increased work of breathing, clipped speech or retractions.   Abdomen: Soft, non-distended, non-tender to palpation. No palpable or pulsatile masses.   Skin: Warm, Dry  Musculoskeletal: Good range of motion in all major joints.   Neurologic: Alert and oriented x4.  Speech clear and cohesive.  5 out of 5 strength in 4 extremities both proximal distal muscle groups.  No pronator drift.  Straight leg raise intact bilaterally.  Psychiatric: Flat affect otherwise judgment normal, Mood normal.       DIAGNOSTIC STUDIES / " PROCEDURES  LABS  Results for orders placed or performed during the hospital encounter of 10/02/19   CBC w/ Differential   Result Value Ref Range    WBC 5.8 4.8 - 10.8 K/uL    RBC 5.96 4.70 - 6.10 M/uL    Hemoglobin 17.9 14.0 - 18.0 g/dL    Hematocrit 53.1 (H) 42.0 - 52.0 %    MCV 89.1 81.4 - 97.8 fL    MCH 30.0 27.0 - 33.0 pg    MCHC 33.7 33.7 - 35.3 g/dL    RDW 39.6 35.9 - 50.0 fL    Platelet Count 194 164 - 446 K/uL    MPV 9.3 9.0 - 12.9 fL    Neutrophils-Polys 56.20 44.00 - 72.00 %    Lymphocytes 30.30 22.00 - 41.00 %    Monocytes 10.50 0.00 - 13.40 %    Eosinophils 2.20 0.00 - 6.90 %    Basophils 0.50 0.00 - 1.80 %    Immature Granulocytes 0.30 0.00 - 0.90 %    Nucleated RBC 0.00 /100 WBC    Neutrophils (Absolute) 3.25 1.82 - 7.42 K/uL    Lymphs (Absolute) 1.76 1.00 - 4.80 K/uL    Monos (Absolute) 0.61 0.00 - 0.85 K/uL    Eos (Absolute) 0.13 0.00 - 0.51 K/uL    Baso (Absolute) 0.03 0.00 - 0.12 K/uL    Immature Granulocytes (abs) 0.02 0.00 - 0.11 K/uL    NRBC (Absolute) 0.00 K/uL   Complete Metabolic Panel (CMP)   Result Value Ref Range    Sodium 138 135 - 145 mmol/L    Potassium 4.2 3.6 - 5.5 mmol/L    Chloride 101 96 - 112 mmol/L    Co2 22 20 - 33 mmol/L    Anion Gap 15.0 (H) 0.0 - 11.9    Glucose 138 (H) 65 - 99 mg/dL    Bun 16 8 - 22 mg/dL    Creatinine 1.12 0.50 - 1.40 mg/dL    Calcium 9.7 8.4 - 10.2 mg/dL    AST(SGOT) 35 12 - 45 U/L    ALT(SGPT) 75 (H) 2 - 50 U/L    Alkaline Phosphatase 98 30 - 99 U/L    Total Bilirubin 0.8 0.1 - 1.5 mg/dL    Albumin 4.5 3.2 - 4.9 g/dL    Total Protein 7.7 6.0 - 8.2 g/dL    Globulin 3.2 1.9 - 3.5 g/dL    A-G Ratio 1.4 g/dL   Troponin STAT   Result Value Ref Range    Troponin T 9 6 - 19 ng/L   APTT   Result Value Ref Range    APTT 29.2 24.7 - 36.0 sec   PT/INR   Result Value Ref Range    PT 13.1 12.0 - 14.6 sec    INR 0.98 0.87 - 1.13   Phosphorus   Result Value Ref Range    Phosphorus 1.8 (L) 2.5 - 4.5 mg/dL   Magnesium   Result Value Ref Range    Magnesium 1.9 1.5 - 2.5  mg/dL   TSH (for screening thyroid dysfunction)   Result Value Ref Range    TSH 3.900 0.380 - 5.330 uIU/mL   ESTIMATED GFR   Result Value Ref Range    GFR If African American >60 >60 mL/min/1.73 m 2    GFR If Non African American >60 >60 mL/min/1.73 m 2   EKG   Result Value Ref Range    Report       [facility] Emergency Dept.    Test Date:  2019-10-02  Pt Name:    LUCILLE FRANCO             Department: EDSM  MRN:        [id number]                      [facility]  Gender:     Male                         Technician: JOSE  :        1950                   Requested By:ER TRIAGE PROTOCOL  Order #:    [id number]                    Reading MD: DEBO HUIZAR DO    Measurements  Intervals                                Axis  Rate:       74                           P:  OH:                                      QRS:        33  QRSD:       88                           T:          37  QT:         355  QTc:        394    Interpretive Statements  Atrial fibrillation  No previous ECG available for comparison    Electronically Signed On 10-2-2019 11:06:07 PDT by DEBO HUIZAR DO         RADIOLOGY  CT-HEAD W/O   Final Result      No acute intracranial hemorrhage is identified.      Mild white matter hypodensity is present.  This is a nonspecific finding which usually is found to represent chronic microvascular disease in patient's of this demographic.  Demyelination, age indeterminant ischemia and gliosis are also common    possibilities.      Mild sphenoid sinus inflammatory disease      DX-CHEST-PORTABLE (1 VIEW)   Final Result      Left basilar opacity is likely from atelectasis, favored over consolidation        COURSE & MEDICAL DECISION MAKING  Nursing notes and vital signs were reviewed. (See chart for details)  The patients records were reviewed, history was obtained from the patient;     ED evaluation does demonstrate atrial fibrillation of the patient's rate is quite  controlled.  He does have a distant history for A. fib but denies any current medications or work-up for this.  He appears symptomatic to this rhythm, some near syncope, fatigue and malaise.  Labs are unremarkable, no electrolyte derangement, first troponin negative.  Chest x-ray within normal limits.  EKG atrial fibrillation without ischemic changes.  Continuous telemetry with the same.  Additionally, patient had some vague left upper extremity heaviness and involuntary movement.  Today neurologically intact and nonfocal.  NIH 0.  No indication for TPA given duration of symptoms which are not reproducible at this time.  CT does show nonspecific white matter hypodensity, I do believe MRI would be beneficial.  Furthermore, patient will be admitted for cardiac evaluation and possible cardiology consultation regarding rhythm and cardioversion if indicated.  Patient is aware the findings and agreeable to the disposition plan.    11:12 AM Dr. Ruiz is aware the patient agreeable to admission.      FINAL IMPRESSION  (I48.91) Atrial fibrillation, unspecified type (HCC)  (R55) Near syncope  (M62.81) Muscle weakness of left upper extremity      Electronically signed by: Maya Thornton, 10/2/2019 11:00 AM      This dictation was created using voice recognition software. The accuracy of the dictation is limited to the abilities of the software. I expect there may be some errors of grammar and possibly content. The nursing notes were reviewed and certain aspects of this information were incorporated into this note.

## 2019-10-02 NOTE — DISCHARGE SUMMARY
"Discharge Summary    CHIEF COMPLAINT ON ADMISSION  Chief Complaint   Patient presents with   • Palpitations     Pt states he has felt his heart is \"fluttering\" and feels like it is \"irregular and going fast\"; pt state he has a hx of going into afib in the past       Reason for Admission  possible irregular hear beat     Admission Date  10/2/2019    CODE STATUS  Full Code    HPI & HOSPITAL COURSE  This is a 69 y.o. male here with palpitations and fatigue. He was found to be in atrial fibrillation of which he has a history of in the past. He was admitted and provided with beta blocker therapy and changed to eliquis from aspirin given he has a CHADS-VASC of 2. He had a normal echo in April with his PCP thus this was not repeated. He is rate controlled. He has been set up with follow up with cardiology in the near future. i have discussed this management with Dr Strauss today from cardiology.         Therefore, he is discharged in good and stable condition to home with close outpatient follow-up.    The patient met 2-midnight criteria for an inpatient stay at the time of discharge.    Discharge Date  10/2/19    FOLLOW UP ITEMS POST DISCHARGE  See cardiology    DISCHARGE DIAGNOSES  Principal Problem:    Paroxysmal atrial fibrillation (HCC) POA: Yes  Active Problems:    Essential hypertension POA: Yes    Impaired fasting glucose POA: Yes    Liver enzyme elevation POA: Unknown  Resolved Problems:    * No resolved hospital problems. *      FOLLOW UP  Future Appointments   Date Time Provider Department Center   10/31/2019 11:20 AM Radha Culp M.D. [facility] S. Rayarash Culp M.D.  [address]  [address]  [facility] [address]  [phone]          Rosie Strauss M.D.  [address]  [address]  [facility] [address]  [phone]            MEDICATIONS ON DISCHARGE     Medication List      START taking these medications      Instructions   apixaban 5mg Tabs  Commonly known as:  ELIQUIS   Take 1 Tab by " mouth 2 Times a Day.  Dose:  5 mg     metoprolol 25 MG Tabs  Start taking on:  10/3/2019  Commonly known as:  LOPRESSOR   Take 0.5 Tabs by mouth 2 Times a Day.  Dose:  12.5 mg        CONTINUE taking these medications      Instructions   acetaminophen 325 MG Tabs  Commonly known as:  TYLENOL   Take 325 mg by mouth every four hours as needed for Moderate Pain.  Dose:  325 mg     fluticasone 50 MCG/ACT nasal spray  Commonly known as:  FLONASE   Spray 1 Spray in nose 1 time daily as needed (ALLERGY).  Dose:  1 Spray     MULTIVITAMIN PO   Take 1 Tab by mouth every morning.  Dose:  1 Tab     valsartan 80 MG Tabs  Commonly known as:  DIOVAN   Take 80 mg by mouth every morning.  Dose:  80 mg     VITAMIN B1 PO   Take 1 Tab by mouth every morning.  Dose:  1 Tab     ZANTAC PO   Take 1 Tab by mouth every morning.  Dose:  1 Tab        STOP taking these medications    aspirin 81 MG Chew chewable tablet  Commonly known as:  ASA     fish oil 1000 MG Caps capsule            Allergies  Allergies   Allergen Reactions   • Molds & Smuts Unspecified     Sneezing       DIET  Orders Placed This Encounter   Procedures   • Diet Order Regular     Standing Status:   Standing     Number of Occurrences:   1     Order Specific Question:   Diet:     Answer:   Regular [1]       ACTIVITY  As tolerated.  Weight bearing as tolerated    CONSULTATIONS  none- I did discussed this patient with cardiology by phone.     PROCEDURES  none    LABORATORY  Lab Results   Component Value Date    SODIUM 138 10/02/2019    POTASSIUM 4.2 10/02/2019    CHLORIDE 101 10/02/2019    CO2 22 10/02/2019    GLUCOSE 138 (H) 10/02/2019    BUN 16 10/02/2019    CREATININE 1.12 10/02/2019        Lab Results   Component Value Date    WBC 5.8 10/02/2019    HEMOGLOBIN 17.9 10/02/2019    HEMATOCRIT 53.1 (H) 10/02/2019    PLATELETCT 194 10/02/2019        Total time of the discharge process exceeds 64 minutes.

## 2019-10-02 NOTE — CARE PLAN
Problem: Safety  Goal: Will remain free from falls  Outcome: PROGRESSING AS EXPECTED  Note:   Reinforced fall risk precautions. Bed alarm on, Non-skid socks on feet, call light in reach. Independent to the bathroom.        Problem: Knowledge Deficit  Goal: Knowledge of disease process/condition, treatment plan, diagnostic tests, and medications will improve  Outcome: PROGRESSING AS EXPECTED  Note:   Discuss POC with Pt. Encourage patient to ask questions and be involved in plan of care. Assess Pt's knowledge of disease process, treatment plan, diagnostic test, labs, and medications; educate, and give information as needed.

## 2019-10-02 NOTE — ED NOTES
MEDICATION RECONCILIATION UPDATED AND COMPLETE PER PT AT BEDSIDE  ALLERGIES HAVE BEEN VERIFIED AND UPDATED   NO ORAL ABX WITHIN THE LAST 14 DAYS  PT HOME PHARMACY: [facility]

## 2019-10-29 ENCOUNTER — TELEPHONE (OUTPATIENT)
Dept: CARDIOLOGY | Facility: MEDICAL CENTER | Age: 69
End: 2019-10-29

## 2019-10-31 ENCOUNTER — OFFICE VISIT (OUTPATIENT)
Dept: CARDIOLOGY | Facility: MEDICAL CENTER | Age: 69
End: 2019-10-31
Payer: MEDICARE

## 2019-10-31 ENCOUNTER — APPOINTMENT (OUTPATIENT)
Dept: MEDICAL GROUP | Facility: MEDICAL CENTER | Age: 69
End: 2019-10-31
Payer: MEDICARE

## 2019-10-31 ENCOUNTER — TELEPHONE (OUTPATIENT)
Dept: CARDIOLOGY | Facility: MEDICAL CENTER | Age: 69
End: 2019-10-31

## 2019-10-31 VITALS
HEART RATE: 50 BPM | OXYGEN SATURATION: 97 % | BODY MASS INDEX: 28.37 KG/M2 | HEIGHT: 77 IN | WEIGHT: 240.3 LBS | DIASTOLIC BLOOD PRESSURE: 84 MMHG | SYSTOLIC BLOOD PRESSURE: 122 MMHG

## 2019-10-31 DIAGNOSIS — I10 ESSENTIAL HYPERTENSION: ICD-10-CM

## 2019-10-31 DIAGNOSIS — I48.0 PAROXYSMAL ATRIAL FIBRILLATION (HCC): ICD-10-CM

## 2019-10-31 PROCEDURE — 99204 OFFICE O/P NEW MOD 45 MIN: CPT | Performed by: INTERNAL MEDICINE

## 2019-10-31 RX ORDER — INFLUENZA A VIRUS A/MICHIGAN/45/2015 X-275 (H1N1) ANTIGEN (FORMALDEHYDE INACTIVATED), INFLUENZA A VIRUS A/SINGAPORE/INFIMH-16-0019/2016 IVR-186 (H3N2) ANTIGEN (FORMALDEHYDE INACTIVATED), AND INFLUENZA B VIRUS B/MARYLAND/15/2016 BX-69A (A B/COLORADO/6/2017-LIKE VIRUS) ANTIGEN (FORMALDEHYDE INACTIVATED) 60; 60; 60 UG/.5ML; UG/.5ML; UG/.5ML
INJECTION, SUSPENSION INTRAMUSCULAR
Refills: 0 | COMMUNITY
Start: 2019-09-30 | End: 2019-10-31

## 2019-10-31 NOTE — PROGRESS NOTES
"CARDIOLOGY NEW PATIENT CONSULTATION    PCP: Radha Culp M.D.    1. Paroxysmal atrial fibrillation (HCC)  CHADS-VASc=2. Rare episodes with symptoms of fatigue.   - Eliquis 5 BID to continue  - Stress ECG  - Nocturnal oximetry  - metoprolol as needed during attacks  - Borderline LVH on echo, would not be a contraindication to 1c antiarrhythmics    2. Essential hypertension  Well controlled on Valsartan    Follow up with Hira Starr M.D. in 1 year     Chief complaint: Atrial fibrillation    History: Gopal Mallory Jr. is a 69 y.o. male with a past medical history of hypertension and paroxysmal atrial fibrillation presenting for consultation regarding atrial fibrillation.  He has had the diagnosis for many years and seems to have a trigger of heavy physical exercise.  He had been followed by a cardiologist in [place] before relocating to [place].  He had not been using anticoagulation.  Recently, after heavy workout the patient felt markedly fatigued.  Using the alive core device he was able to identify atrial fibrillation with a rapid rate.  He went to the emergency department and was given metoprolol and anticoagulation.  Symptoms persisted for several days before subsiding.  He confirmed himself to be in normal rhythm with the tracings from the [facility].  He has been well since.  He does report daytime fatigue and somnolence though believes this is been corrected by moving valsartan to the evening.    He does not experience chest pain or shortness of breath.    ROS:   All other systems reviewed and negative except as per the HPI    PE:  /84 (BP Location: Right arm, Patient Position: Sitting, BP Cuff Size: Adult)   Pulse (!) 50   Ht 1.956 m (6' 5\")   Wt 109 kg (240 lb 4.8 oz)   SpO2 97%   BMI 28.50 kg/m²   GEN: Well appearing  HEENT: Symmetric face. Anicteric sclerae. Moist mucus membranes  NECK: No JVD. No lymphadenopathy  CARDIAC: Normal PMI,  regular, normal S1, S2.   VASCULATURE: " Normal carotid amplitude without bruit.   RESP: Clear to auscultation bilaterally  ABD: Soft, non-tender, non-distended  EXT: No  edema, no clubbing or cyanosis  SKIN: Warm and dry  NEURO: No gross deficits   PSYCH: Appropriate affect, participates in conversation    Past Medical History:   Diagnosis Date   • Arrhythmia     Afib   • Cancer (HCC)     Basal cell/ Squamous skin   • Hypertension      Past Surgical History:   Procedure Laterality Date   • OTHER      mohs to nose, back     Allergies   Allergen Reactions   • Molds & Smuts Unspecified     Sneezing     Outpatient Encounter Medications as of 10/31/2019   Medication Sig Dispense Refill   • valsartan (DIOVAN) 80 MG Tab Take 80 mg by mouth every morning.     • Multiple Vitamins-Minerals (MULTIVITAMIN PO) Take 1 Tab by mouth every morning.     • acetaminophen (TYLENOL) 325 MG Tab Take 325 mg by mouth every four hours as needed for Moderate Pain.     • fluticasone (FLONASE) 50 MCG/ACT nasal spray Spray 1 Spray in nose 1 time daily as needed (ALLERGY).     • apixaban (ELIQUIS) 5mg Tab Take 1 Tab by mouth 2 Times a Day. 60 Tab 4   • [DISCONTINUED] FLUZONE HIGH-DOSE 0.5 ML Suspension Prefilled Syringe injection TO BE ADMINISTERED BY PHARMACIST FOR IMMUNIZATION  0   • [DISCONTINUED] Thiamine HCl (VITAMIN B1 PO) Take 1 Tab by mouth every morning.     • [DISCONTINUED] raNITIdine HCl (ZANTAC PO) Take 1 Tab by mouth every morning.     • [DISCONTINUED] metoprolol (LOPRESSOR) 25 MG Tab Take 0.5 Tabs by mouth 2 Times a Day. (Patient not taking: Reported on 10/31/2019) 60 Tab 3     No facility-administered encounter medications on file as of 10/31/2019.        Family History   Problem Relation Age of Onset   • Cancer Mother         bladder non-smoker   • Dementia Father    • Cancer Brother         prostate age 52     Social History     Socioeconomic History   • Marital status:      Spouse name: Not on file   • Number of children: Not on file   • Years of education: Not  on file   • Highest education level: Not on file   Occupational History   • Not on file   Social Needs   • Financial resource strain: Not on file   • Food insecurity:     Worry: Not on file     Inability: Not on file   • Transportation needs:     Medical: Not on file     Non-medical: Not on file   Tobacco Use   • Smoking status: Never Smoker   • Smokeless tobacco: Never Used   Substance and Sexual Activity   • Alcohol use: Yes     Comment: 2 glasses wine/night   • Drug use: No   • Sexual activity: Yes     Partners: Female   Lifestyle   • Physical activity:     Days per week: Not on file     Minutes per session: Not on file   • Stress: Not on file   Relationships   • Social connections:     Talks on phone: Not on file     Gets together: Not on file     Attends Muslim service: Not on file     Active member of club or organization: Not on file     Attends meetings of clubs or organizations: Not on file     Relationship status: Not on file   • Intimate partner violence:     Fear of current or ex partner: Not on file     Emotionally abused: Not on file     Physically abused: Not on file     Forced sexual activity: Not on file   Other Topics Concern   • Not on file   Social History Narrative   • Not on file       Studies  Lab Results   Component Value Date/Time    CHOLSTRLTOT 178 03/01/2019 07:44 AM     (H) 03/01/2019 07:44 AM    HDL 51 03/01/2019 07:44 AM    TRIGLYCERIDE 131 03/01/2019 07:44 AM       Lab Results   Component Value Date/Time    SODIUM 138 10/02/2019 08:00 AM    POTASSIUM 4.2 10/02/2019 08:00 AM    CHLORIDE 101 10/02/2019 08:00 AM    CO2 22 10/02/2019 08:00 AM    GLUCOSE 138 (H) 10/02/2019 08:00 AM    BUN 16 10/02/2019 08:00 AM    CREATININE 1.12 10/02/2019 08:00 AM     Lab Results   Component Value Date/Time    ALKPHOSPHAT 98 10/02/2019 08:00 AM    ASTSGOT 35 10/02/2019 08:00 AM    ALTSGPT 75 (H) 10/02/2019 08:00 AM    TBILIRUBIN 0.8 10/02/2019 08:00 AM        For this encounter I directly  reviewed ECG tracings and Echo images I agree with the interpretations in the electronic health record except I do not believe he has significant LVH.

## 2019-11-22 ENCOUNTER — PATIENT MESSAGE (OUTPATIENT)
Dept: CARDIOLOGY | Facility: MEDICAL CENTER | Age: 69
End: 2019-11-22

## 2019-11-22 DIAGNOSIS — G47.34 NOCTURNAL HYPOXIA: ICD-10-CM

## 2019-11-22 DIAGNOSIS — I48.0 PAROXYSMAL ATRIAL FIBRILLATION (HCC): ICD-10-CM

## 2019-11-22 NOTE — PATIENT COMMUNICATION
Result Notes for OVERNIGHT PULSE OXIMETRY   Notes recorded by Hira Starr M.D. on 11/22/2019 at 8:40 AM PST  The nocturnal oximetry study shows substantial amount of low oxygen levels throughout sleep.  This likely indicates sleep apnea which contributes to atrial fibrillation.  I suggest a formal polysomnogram/sleep study and treatment with CPAP device if needed     Referral to sleep studies placed. Pt notified in Mychart.

## 2019-12-06 ENCOUNTER — OFFICE VISIT (OUTPATIENT)
Dept: DERMATOLOGY | Facility: IMAGING CENTER | Age: 69
End: 2019-12-06
Payer: MEDICARE

## 2019-12-06 DIAGNOSIS — L81.4 LENTIGO: ICD-10-CM

## 2019-12-06 DIAGNOSIS — Z85.828 HX OF NONMELANOMA SKIN CANCER: ICD-10-CM

## 2019-12-06 DIAGNOSIS — L57.0 ACTINIC KERATOSIS: ICD-10-CM

## 2019-12-06 DIAGNOSIS — D22.9 NEVUS: ICD-10-CM

## 2019-12-06 DIAGNOSIS — Z12.83 SKIN CANCER SCREENING: ICD-10-CM

## 2019-12-06 DIAGNOSIS — L90.8 SKIN AGING: ICD-10-CM

## 2019-12-06 DIAGNOSIS — D49.2 NEOPLASM OF SKIN: ICD-10-CM

## 2019-12-06 DIAGNOSIS — L82.1 SEBORRHEIC KERATOSIS: ICD-10-CM

## 2019-12-06 PROCEDURE — 99213 OFFICE O/P EST LOW 20 MIN: CPT | Mod: 25 | Performed by: DERMATOLOGY

## 2019-12-06 PROCEDURE — 17000 DESTRUCT PREMALG LESION: CPT | Mod: 59 | Performed by: DERMATOLOGY

## 2019-12-06 PROCEDURE — 11102 TANGNTL BX SKIN SINGLE LES: CPT | Performed by: DERMATOLOGY

## 2019-12-06 PROCEDURE — 17003 DESTRUCT PREMALG LES 2-14: CPT | Performed by: DERMATOLOGY

## 2019-12-06 ASSESSMENT — ENCOUNTER SYMPTOMS
FEVER: 0
CHILLS: 0

## 2019-12-06 NOTE — PROGRESS NOTES
CC: skin exam    Subjective: Previously seen patient here for skin exam and to discuss PDT. Few spots on face, issue for patient.     Full skin exam   Last exam 1 year ago   Rt temple still a concern - had LN2, but has returned.    HPI/location: Lt nose   Time present: noticed 3 weeks ago   Painful lesion: No  Itching lesion: No  Enlarging lesion: No  Anything make it better or worse? Mole had been removed by past Derm doctor.  No changing/growth at site.  Happy with flattening at site after tretament    HPI: skin lesion   Location: right forearm , crusting site burns  Time present: 6 months   Painful lesion: No  Itching lesion: Yes  Enlarging lesion: No  Anything make it better or worse?no     HPI: rough skin lesion  Location: right temple   Time present: few years   Painful lesion: No  Itching lesion: Yes  Enlarging lesion: No  Anything make it better or worse?  has been treated with liquid nitrogen - minimal improvement    History of skin cancer: Yes, Details: BCC 30 years ago hairline , over the last several years has had 5 BCC forehead, nose , face , SCC on back ( 7 years ago)   History of Aks/precancers: yes, several over the years treated with cryo and efudex (last field therapy years ago)  History of blistering/severe sunburns:Yes, Details: during youth  Family history of skin cancer:Yes, Details: father type unknown   Family history of atypical moles:No    ROS: no fevers/chills. No itch.    DermPMH: mult past NMSC  No problem-specific Assessment & Plan notes found for this encounter.    Relevant PMH: mult med comorbidities  Social: never smoker    PE: Gen:WDWN male in NAD. Skin: Scalp/face/eyes/lips/oral mucosa/conjunctivae/neck/chest/back/arms/legs/hands/feet/buttocks - without suspicious lesions noted.  Genitals exam declined  -scattered thin flaking hyperkeratosis on face, right temple, less on cheeks  -chest - few thin/moderate hyperkeratosis papules on upper chest  -thin hyperkeratotic papule on distal  right forearm/wrist  -scattered hyperpigmented macules/papules appearing on torso/extremities, appearing benign without suspicious features, many waxy papules on face, torso  -dark waxy plaque, irregular borders on left upper back, approx 1.5cm appearing dissimilar to surrounding waxy papules/plaques    A/P: AKs, chest/right distal forearm:  -counseled on diagnosis and treatment, including risks/benefits/alternatives of cyrotherapy  -LN2 25 sec X 2 cycles X 4lesions  -f/u if persists at 1 month    AK/dermatoheliosis, face:  -counseled re: LN2 vs efudex vs PDT with decision to move forward with PDT  -will schedule for face visit in next few weeks    Neoplasm NOS: back, r/o atypia  -consent for bx, including R/B/A. Cleaned with EtOH, anesthesia with lidocaine 1% + epinephrine, shave bx, AlCl3 for hemostasis  -vaseline/bandage and wound care reviewed    Nevi: benign appearing:  -Reviewed skin cancer detection/prevention  -RTC PRN growth/changes/concerning features    Lentigos/SKs: benign  -reassurance  -reviewed skin cancer detection/prevention    Hx of skin cancer:  -cont'd sunprotection and skin cancer surveillance  -Q 6mo-annual exam recommended; f/u suspicious lesions PRN        I have reviewed medications relevant to my specialty.

## 2019-12-06 NOTE — PROGRESS NOTES
Dermatology New Patient Visit    No chief complaint on file.      Subjective:     HPI:   Gopal Mallory Jr. is a 68 y.o. male presenting for    Full skin exam   Last exam 1 year ago   Rt temple still a concern     HPI/location: Lt nose   Time present: noticed 3 weeks ago   Painful lesion: No  Itching lesion: No  Enlarging lesion: No  Anything make it better or worse?    Recently has moved here from [place] 6 month ago     HPI: skin lesion   Location: right forearm   Time present: 6 months   Painful lesion: No  Itching lesion: Yes  Enlarging lesion: No  Anything make it better or worse?no     HPI: rough skin lesion  Location: right temple   Time present: few years   Painful lesion: No  Itching lesion: Yes  Enlarging lesion: No  Anything make it better or worse?  has been treated with liquid nitrogen - minimal improvement    History of skin cancer: Yes, Details: BCC 30 years ago hairline , over the last several years has had 5 BCC forehead, nose , face , SCC on back ( 7 years ago)   History of Aks/precancers: yes, several over the years treated with cryo and efudex (last field therapy years ago)  History of blistering/severe sunburns:Yes, Details: during youth  Family history of skin cancer:Yes, Details: father type unknown   Family history of atypical moles:No      Past Medical History:   Diagnosis Date   • Arrhythmia     Afib   • Cancer (HCC)     Basal cell/ Squamous skin   • Hypertension        Current Outpatient Medications on File Prior to Visit   Medication Sig Dispense Refill   • valsartan (DIOVAN) 80 MG Tab Take 80 mg by mouth every morning.     • Multiple Vitamins-Minerals (MULTIVITAMIN PO) Take 1 Tab by mouth every morning.     • acetaminophen (TYLENOL) 325 MG Tab Take 325 mg by mouth every four hours as needed for Moderate Pain.     • fluticasone (FLONASE) 50 MCG/ACT nasal spray Spray 1 Spray in nose 1 time daily as needed (ALLERGY).     • apixaban (ELIQUIS) 5mg Tab Take 1 Tab by mouth 2 Times a  Day. 60 Tab 4     No current facility-administered medications on file prior to visit.        Allergies   Allergen Reactions   • Molds & Smuts Unspecified     Sneezing       Family History   Problem Relation Age of Onset   • Cancer Mother         bladder non-smoker   • Dementia Father    • Cancer Brother         prostate age 52       Social History     Socioeconomic History   • Marital status:      Spouse name: Not on file   • Number of children: Not on file   • Years of education: Not on file   • Highest education level: Not on file   Occupational History   • Not on file   Social Needs   • Financial resource strain: Not on file   • Food insecurity:     Worry: Not on file     Inability: Not on file   • Transportation needs:     Medical: Not on file     Non-medical: Not on file   Tobacco Use   • Smoking status: Never Smoker   • Smokeless tobacco: Never Used   Substance and Sexual Activity   • Alcohol use: Yes     Comment: 2 glasses wine/night   • Drug use: No   • Sexual activity: Yes     Partners: Female   Lifestyle   • Physical activity:     Days per week: Not on file     Minutes per session: Not on file   • Stress: Not on file   Relationships   • Social connections:     Talks on phone: Not on file     Gets together: Not on file     Attends Advent service: Not on file     Active member of club or organization: Not on file     Attends meetings of clubs or organizations: Not on file     Relationship status: Not on file   • Intimate partner violence:     Fear of current or ex partner: Not on file     Emotionally abused: Not on file     Physically abused: Not on file     Forced sexual activity: Not on file   Other Topics Concern   • Not on file   Social History Narrative   • Not on file       Review of Systems   Constitutional: Negative for chills and fever.   Skin: Negative for itching and rash.   All other systems reviewed and are negative.       Objective:     A full mucocutaneous exam was completed  including: scalp, hair, ears, face, eyelids, conjunctiva, lips, gums/tongue/oropharynx, neck, chest, abdomen, back, left and right upper extremities (including hands/digits and fingernails), left and right lower extremities (including feet/toes, toenails), buttocks, excluding external genitalia (patient refusal) with the following pertinent findings listed below. Remaining above-listed examined areas within normal limits / negative for rashes or lesions.    There were no vitals taken for this visit.    Physical Exam   Constitutional: He is oriented to person, place, and time and well-developed, well-nourished, and in no distress.   HENT:   Head: Normocephalic and atraumatic.       Right Ear: External ear normal.   Left Ear: External ear normal.   Nose: Nose normal.   Mouth/Throat: Oropharynx is clear and moist.   Eyes: Conjunctivae and lids are normal.   Neck: Normal range of motion. Neck supple.   Cardiovascular: Intact distal pulses.   Pulmonary/Chest: Effort normal.   Neurological: He is alert and oriented to person, place, and time.   Skin: Skin is warm and dry.        Psychiatric: Mood and affect normal.   Vitals reviewed.      DATA: none applicable to review    Assessment and Plan:     1. Neoplasm of uncertain behavior of skin  Procedure Note   Procedure: Biopsy by shave technique  Location: as noted above  Size: as noted in exam  Preoperative diagnosis: collision lesion, r/o atypia  Risks, benefits and alternatives of procedure discussed and written informed consent obtained. Time out completed. Area of biopsy prepped with alcohol. Anesthesia with 1% lidocaine with epinephrine administered with 30 gauge needle. Shave biopsy of the site performed. Hemostasis achieved with pressure and aluminum chloride. Vaseline applied to wound with bandage. Patient tolerated procedure well and there were no complications. The specimen was sent to the pathology lab by the staff. Wound care was discussed.  - Pathology  Specimen; Future    2. Actinic keratoses  CRYOTHERAPY:  Risks (including, but not limited to: hypo or hyperpigmentation, redness, blister, blood blister, recurrence, need for further treatment, infection, scar) and benefits of cryotherapy discussed. Patient verbally agreed to proceed with treatment. 2 cryotherapy freeze thaw cycles of 10 seconds were applied to 7 lesions on the face with cryac. Patient tolerated procedure well. Aftercare instructions given.  - discussed importance of yearly field therapy -- will plan for PDT in the early fall  I counseled the patient regarding the following:  The risks of PDT, including, but not limited to, pigmentary changes, pain, blistering, scabbing, redness, remote possibility of scarring, failed treatment, were discussed.   Post care discussed, including the absolute need to avoid sunlight for 2 days (48 hours) post-procedure, and the need to wear sun protection. Patient may experience sunburn like redness, discomfort, swelling, and scabbing. Must wear zinc oxide sunscreen at all times during the week of healing.  - no h/o HSV/cold sores on the lips  -patient aware to call me with any questions or concerns.    3. History of basal cell carcinoma  Skin cancer education  - discussed importance of sun protective clothing, eyewear  - discussed importance of daily use of broad spectrum sunscreen with SPF 30 or greater, as well as need for reapplication ~every 2 hours when exposed to UVR  - discussed importance of regular self-exams, ideally once per month, every 6 months exams in clinic  - ABCDE's of melanoma discussed  - patient to bring any new or concerning lesions to my attention    4. History of squamous cell carcinoma  - skin cancer education/counseling as noted above    5. Multiple nevi  - Benign-appearing nature of lesions discussed. Advised to return to clinic for any new or concerning changes.    6. Seborrheic keratoses  - Benign-appearing nature of lesions discussed.  Advised to return to clinic for any new or concerning changes.      Followup: No follow-ups on file.    Manuel Najera Ass't        Physical Exam  Vitals signs reviewed.   HENT:      Head: Normocephalic and atraumatic.        Right Ear: External ear normal.      Left Ear: External ear normal.      Nose: Nose normal.      Mouth/Throat:      Mouth: Oropharynx is clear and moist.   Eyes:      General: Lids are normal.      Conjunctiva/sclera: Conjunctivae normal.   Neck:      Musculoskeletal: Normal range of motion and neck supple.   Cardiovascular:      Pulses: Intact distal pulses.   Pulmonary:      Effort: Pulmonary effort is normal.   Skin:     General: Skin is warm and dry.          Neurological:      Mental Status: He is alert and oriented to person, place, and time.   Psychiatric:         Mood and Affect: Mood and affect normal.

## 2019-12-11 DIAGNOSIS — I10 ESSENTIAL HYPERTENSION: ICD-10-CM

## 2019-12-11 RX ORDER — VALSARTAN 80 MG/1
TABLET ORAL
Qty: 90 TAB | Refills: 1 | Status: SHIPPED | OUTPATIENT
Start: 2019-12-11 | End: 2020-06-03

## 2019-12-19 ENCOUNTER — OFFICE VISIT (OUTPATIENT)
Dept: DERMATOLOGY | Facility: IMAGING CENTER | Age: 69
End: 2019-12-19
Payer: MEDICARE

## 2019-12-19 DIAGNOSIS — Z51.89 ENCOUNTER FOR WOUND CARE: ICD-10-CM

## 2019-12-19 DIAGNOSIS — L82.1 SEBORRHEIC KERATOSIS: ICD-10-CM

## 2019-12-19 PROCEDURE — 99212 OFFICE O/P EST SF 10 MIN: CPT | Performed by: DERMATOLOGY

## 2019-12-19 NOTE — PROGRESS NOTES
"CC: spot check    Subjective: Previously seen patient here for spot check of back biopsy site - to check status of healing after biopsy ~12 days ago.     From prior notes:  \"History of skin cancer: Yes, Details: BCC 30 years ago hairline , over the last several years has had 5 BCC forehead, nose , face , SCC on back ( 7 years ago)   History of Aks/precancers: yes, several over the years treated with cryo and efudex (last field therapy years ago)  History of blistering/severe sunburns:Yes, Details: during youth  Family history of skin cancer:Yes, Details: father type unknown   Family history of atypical moles:No\"    ROS: no fevers/chills. No itch.    DermPMH: mult past NMSC  No problem-specific Assessment & Plan notes found for this encounter.    Relevant PMH: mult med comorbidities  Social: never smoker    PE: Gen:WDWN male in NAD. Skin: Back: granulating biopsy site on left posterior shoulder, 1-2mm margins erythema. Not appreciably TTP.     Path: pigmented SK, back.    A/P: biopsy site, appearing to be healing well.  No signs of infection:back  -counseled re: wound care and anticipatory guidance as healing continues  -rec vaseline + bandage    Has f/u for PDT in 2020.      I have reviewed medications relevant to my specialty.            "

## 2019-12-30 ENCOUNTER — OFFICE VISIT (OUTPATIENT)
Dept: MEDICAL GROUP | Facility: MEDICAL CENTER | Age: 69
End: 2019-12-30
Payer: MEDICARE

## 2019-12-30 VITALS
DIASTOLIC BLOOD PRESSURE: 78 MMHG | HEART RATE: 88 BPM | HEIGHT: 77 IN | RESPIRATION RATE: 16 BRPM | OXYGEN SATURATION: 97 % | WEIGHT: 242.06 LBS | TEMPERATURE: 100.4 F | BODY MASS INDEX: 28.58 KG/M2 | SYSTOLIC BLOOD PRESSURE: 132 MMHG

## 2019-12-30 DIAGNOSIS — Z12.5 ENCOUNTER FOR SCREENING FOR MALIGNANT NEOPLASM OF PROSTATE: ICD-10-CM

## 2019-12-30 DIAGNOSIS — R74.8 LIVER ENZYME ELEVATION: ICD-10-CM

## 2019-12-30 DIAGNOSIS — R71.8 ELEVATED HEMATOCRIT: ICD-10-CM

## 2019-12-30 DIAGNOSIS — Z80.42 FAMILY HISTORY OF PROSTATE CANCER: ICD-10-CM

## 2019-12-30 DIAGNOSIS — I10 ESSENTIAL HYPERTENSION: ICD-10-CM

## 2019-12-30 DIAGNOSIS — E78.00 ELEVATED LDL CHOLESTEROL LEVEL: ICD-10-CM

## 2019-12-30 DIAGNOSIS — M25.612 STIFFNESS OF LEFT SHOULDER JOINT: ICD-10-CM

## 2019-12-30 DIAGNOSIS — Z11.59 ENCOUNTER FOR HEPATITIS C SCREENING TEST FOR LOW RISK PATIENT: ICD-10-CM

## 2019-12-30 DIAGNOSIS — R74.01 ELEVATED ALT MEASUREMENT: ICD-10-CM

## 2019-12-30 DIAGNOSIS — I48.0 PAROXYSMAL ATRIAL FIBRILLATION (HCC): ICD-10-CM

## 2019-12-30 DIAGNOSIS — R73.01 IMPAIRED FASTING GLUCOSE: ICD-10-CM

## 2019-12-30 PROBLEM — M79.671 RIGHT FOOT PAIN: Status: RESOLVED | Noted: 2019-03-11 | Resolved: 2019-12-30

## 2019-12-30 PROCEDURE — G0439 PPPS, SUBSEQ VISIT: HCPCS | Performed by: INTERNAL MEDICINE

## 2019-12-30 ASSESSMENT — PATIENT HEALTH QUESTIONNAIRE - PHQ9: CLINICAL INTERPRETATION OF PHQ2 SCORE: 0

## 2019-12-30 ASSESSMENT — ACTIVITIES OF DAILY LIVING (ADL): BATHING_REQUIRES_ASSISTANCE: 0

## 2019-12-30 ASSESSMENT — ENCOUNTER SYMPTOMS: GENERAL WELL-BEING: GOOD

## 2019-12-30 NOTE — PROGRESS NOTES
Chief Complaint   Patient presents with   • Annual Exam     review bp meds          HPI:  Gopal Mallory Jr. is a 69 y.o. here for Medicare Annual Wellness Visit     He has been following regularly w/dermatology.  He also notes that he is seeing urology annually w/ last PSA normal 3/2019.  GI note was reviewed with patient today indicated that he should have upper EGD, patient says this was not performed, he will inquire with GI.  Barium swallow 8/12/2019 did show signs of GERD without stricture or mass.  Patient says GERD has been well controlled.  Since Zantac has been on recall he is willing to switch out to cimetidine.  He had incidentally noted ALT at 75 on labs ER 10/2/2019.  No right upper quadrant pain.  We plan to recheck.    Since last appointment did see cardiology, note from 10/21/2019 reviewed.  He did have another episode of atrial fibrillation with exercise.  He is pending stress test.  He is now on Eliquis.  He tells me he recently did overnight oximetry and he is pending cardiac stress test w/cardiology f/u in February.  He has metoprolol take as needed for atrial fibrillation.  Labs from the ER visit 10/2/2019 indicate mildly elevate hematocrit of 53.  WBC returned to normal.  He had normal TSH and GFR at that time.    History impaired fasting glucose with A1c 5.7 on labs 3/2019.    At the time of his atrial fibrillation 10/2/2019 he was noted to have some slumping towards his left side per wife.  They did do brain MRI 10/2/2019 which showed no signs of acute stroke but there were age-related changes of atrophy and microvascular ischemic changes.  He tells me he has no focal weakness of any of his extremities.  He thinks he is limping because he had been reading a book on a new chair and leans on one side which causes some stiffness in his right shoulder.  He is interested in physical therapy to assist this.  He says when he bikes or rows he feels no weakness or trouble.    Noted  low-grade fever today he says he recently got back from the [place] area where he was exposed to a lot of people, homemade sushi and new diet with a lot of processed meat.  He says this is giving him a lot of gas, bloating and some constipation.  He is having bowel was every day but they are pellet-like in size.  Some minimal discomfort from the gas.  Overall he is feeling better.  He has no productive cough, body sores or other focal causes low-grade fever.    Patient Active Problem List    Diagnosis Date Noted   • Liver enzyme elevation 10/02/2019   • Family history of prostate cancer 04/22/2019   • Impaired fasting glucose 03/11/2019   • Paroxysmal atrial fibrillation (HCC) 02/11/2019   • Basal cell carcinoma (BCC) of skin of face 02/11/2019   • Squamous cell carcinoma 02/11/2019   • History of varicocele 02/11/2019   • Essential hypertension 01/15/2019       Current Outpatient Medications   Medication Sig Dispense Refill   • valsartan (DIOVAN) 80 MG Tab TAKE 1 TABLET BY MOUTH EVERY DAY 90 Tab 1   • acetaminophen (TYLENOL) 325 MG Tab Take 325 mg by mouth every four hours as needed for Moderate Pain.     • fluticasone (FLONASE) 50 MCG/ACT nasal spray Spray 1 Spray in nose 1 time daily as needed (ALLERGY).     • apixaban (ELIQUIS) 5mg Tab Take 1 Tab by mouth 2 Times a Day. 60 Tab 4   • metoprolol (LOPRESSOR) 25 MG Tab Take 12.5 mg by mouth 2 times a day as needed.  3   • Multiple Vitamins-Minerals (MULTIVITAMIN PO) Take 1 Tab by mouth every morning.       No current facility-administered medications for this visit.             Current supplements as per medication list.       Allergies: Molds & smuts    Current social contact/activities: gym     He  reports that he has never smoked. He has never used smokeless tobacco. He reports current alcohol use. He reports that he does not use drugs.  Counseling given: Not Answered      DPA/Advanced Directive:  Patient does not have an Advanced Directive.  A packet and workshop  information was given on Advanced Directives.    ROS:    Gait: Uses no assistive device  Ostomy: No  Other tubes: No  Amputations: No  Chronic oxygen use: No  Last eye exam: about 1 y/a  Wears hearing aids: No   : Denies any urinary leakage during the last 6 months    Screening:    Depression Screening  Pt denies depression  Little interest or pleasure in doing things?  0 - not at all  Feeling down, depressed , or hopeless? 0 - not at all  Trouble falling or staying asleep, or sleeping too much?     Feeling tired or having little energy?     Poor appetite or overeating?     Feeling bad about yourself - or that you are a failure or have let yourself or your family down?    Trouble concentrating on things, such as reading the newspaper or watching television?    Moving or speaking so slowly that other people could have noticed.  Or the opposite - being so fidgety or restless that you have been moving around a lot more than usual?     Thoughts that you would be better off dead, or of hurting yourself?     Patient Health Questionnaire Score:      If depressive symptoms identified deferred to follow up visit unless specifically addressed in assessment and plan.    Interpretation of PHQ-9 Total Score   Score Severity   1-4 No Depression   5-9 Mild Depression   10-14 Moderate Depression   15-19 Moderately Severe Depression   20-27 Severe Depression      Screening for Cognitive Impairment    Three Minute Recall (village, kitchen, baby) 0/3    Jimbo clock face with all 12 numbers and set the hands to show 10 past 10.  Yes    Cognitive concerns identified deferred for follow up unless specifically addressed in assessment and plan.    Fall Risk Assessment    Has the patient had two or more falls in the last year or any fall with injury in the last year?  No    Safety Assessment    Throw rugs on floor.  Yes  Handrails on all stairs.  No  Good lighting in all hallways.  Yes  Difficulty hearing.  No  Patient counseled about all  safety risks that were identified.    Functional Assessment ADLs    Are there any barriers preventing you from cooking for yourself or meeting nutritional needs?  No.    Are there any barriers preventing you from driving safely or obtaining transportation?  No.    Are there any barriers preventing you from using a telephone or calling for help?  No.    Are there any barriers preventing you from shopping?  No.    Are there any barriers preventing you from taking care of your own finances?  No.    Are there any barriers preventing you from managing your medications?  No.    Are there any barriers preventing you from showering, bathing or dressing yourself? No.    Are you currently engaging in any exercise or physical activity?  Yes.     What is your perception of your health?  Good.      Health Maintenance Summary                Annual Wellness Visit Overdue 1950     HEPATITIS C SCREENING Overdue 1950     IMM ZOSTER VACCINES Overdue 9/7/2002     IMM DTaP/Tdap/Td Vaccine Next Due 3/31/2020      Done 3/31/2010 Imm Admin: Tdap Vaccine    IMM PNEUMOCOCCAL VACCINE: 65+ Years Next Due 4/22/2020      Done 4/22/2019 Imm Admin: Pneumococcal polysaccharide vaccine (PPSV-23)    COLONOSCOPY Next Due 8/8/2024      Done 8/8/2019 REFERRAL TO GI FOR COLONOSCOPY     Patient has more history with this topic...          Patient Care Team:  Radha Culp M.D. as PCP - General (Internal Medicine)      Social History     Tobacco Use   • Smoking status: Never Smoker   • Smokeless tobacco: Never Used   Substance Use Topics   • Alcohol use: Yes     Comment: 2 glasses wine/night   • Drug use: No     Family History   Problem Relation Age of Onset   • Cancer Mother         bladder non-smoker   • Dementia Father    • Cancer Brother         prostate age 52     He  has a past medical history of Arrhythmia, Cancer (HCC), and Hypertension.   Past Surgical History:   Procedure Laterality Date   • OTHER      mohs to nose, back       Exam:  "  /78 (BP Location: Right arm, Patient Position: Sitting, BP Cuff Size: Adult)   Pulse 88   Temp 38 °C (100.4 °F) (Temporal)   Resp 16   Ht 1.956 m (6' 5\")   Wt 109.8 kg (242 lb 1 oz)   SpO2 97%  Body mass index is 28.7 kg/m².    Hearing good.    Dentition good  Alert, oriented in no acute distress.  Eye contact is good, speech goal directed, affect calm    Assessment and Plan. The following treatment and monitoring plan is recommended:    1. Paroxysmal atrial fibrillation (HCC)  Initial Annual Wellness Visit - Includes PPPS ()   2. Elevated hematocrit  CBC WITH DIFFERENTIAL    Initial Annual Wellness Visit - Includes PPPS ()   3. Elevated ALT measurement  HEPATIC FUNCTION PANEL    Initial Annual Wellness Visit - Includes PPPS ()   4. Impaired fasting glucose  HEMOGLOBIN A1C    Initial Annual Wellness Visit - Includes PPPS ()   5. Elevated LDL cholesterol level  Lipid Profile    Initial Annual Wellness Visit - Includes PPPS ()   6. Encounter for hepatitis C screening test for low risk patient  HEP C VIRUS ANTIBODY    Initial Annual Wellness Visit - Includes PPPS ()   7. Encounter for screening for malignant neoplasm of prostate  PROSTATE SPECIFIC AG SCREENING    Initial Annual Wellness Visit - Includes PPPS ()   8. Essential hypertension  Initial Annual Wellness Visit - Includes PPPS ()   9. Family history of prostate cancer  Initial Annual Wellness Visit - Includes PPPS ()   10. Liver enzyme elevation  Initial Annual Wellness Visit - Includes PPPS ()   11. Stiffness of left shoulder joint  Initial Annual Wellness Visit - Includes PPPS ()     Physical therapy for his shoulder.  Brain imaging was normal.  Continue follow-up with urology, dermatology.  Pending follow-up audiology to review overnight oximetry and stress testing.  He will remain on Eliquis has PRN metoprolol for atrial fibrillation episodes.  Repeat ALT and hematocrit for trend.  Patient " will ask GI if he does need EGD--clinic note printed for patient with annotation.  For constipation advised prunes, walking, MiraLAX, 30 g fiber in diet, senna or magnesium type laxative if needed.  Patient advised if he develops abd pain especially with nausea and vomiting to go to ER.  May be the cause of his low-grade fever is a viral gastritis that he could have been exposed to during his recent travels.    Trouble with word recall, more extensive memory testing next appointment.    Services suggested: No services needed at this time  Health Care Screening: Age-appropriate preventive services recommended by USPTF and ACIP covered by Medicare were discussed today. Services ordered if indicated and agreed upon by the patient.  Referrals offered: Community-based lifestyle interventions to reduce health risks and promote self-management and wellness, fall prevention, nutrition, physical activity, tobacco-use cessation, weight loss, and mental health services as per orders if indicated.    Discussion today about general wellness and lifestyle habits:    · Prevent falls and reduce trip hazards; Cautioned about securing or removing rugs.  · Have a working fire alarm and carbon monoxide detector;   · Engage in regular physical activity and social activities     Follow-up: 3mo

## 2020-01-09 ENCOUNTER — OFFICE VISIT (OUTPATIENT)
Dept: DERMATOLOGY | Facility: IMAGING CENTER | Age: 70
End: 2020-01-09

## 2020-01-09 ENCOUNTER — OFFICE VISIT (OUTPATIENT)
Dept: DERMATOLOGY | Facility: IMAGING CENTER | Age: 70
End: 2020-01-09
Payer: MEDICARE

## 2020-01-09 DIAGNOSIS — L57.0 ACTINIC KERATOSIS: ICD-10-CM

## 2020-01-09 PROCEDURE — 96573 PDT DSTR PRMLG LES PHYS/QHP: CPT | Performed by: DERMATOLOGY

## 2020-01-09 NOTE — PROGRESS NOTES
CC: PDT treatment    Subjective:Previously seen patient here for trx PDT - face for AKs.    ROS: no fevers/chills. No itch.    DermPMH: no skin cancer/melanoma  No problem-specific Assessment & Plan notes found for this encounter.    Relevant PMH:BCC 30 years ago hairline , over the last several years has had 5 BCC forehead, nose , face , SCC on back ( 7 years ago)  Social: never smoker    PE: Gen:WDWN male in NAD.  Skin: focal exam: face - gritty papules on red base scattered on forehead/cheeks, temples. Less on nose.  Lower face appearing much less affected.    A/P:   PDT Treatment    AKs:   I counseled the patient regarding the following:  The risks of PDT, including, but not limited to, pigmentary changes, pain, blistering, scabbing, redness, remote possibility of scarring, failed treatment, were discussed.   Post care discussed, including the absolute need to avoid sunlight for 2 days (48 hours) post-procedure, and the need to wear sun protection. Patient may experience sunburn like redness, discomfort, swelling, and scabbing. Must wear zinc oxide sunscreen at all times during the week of healing.    Patient aware to call me with any questions or concerns.  See ALA + blue light treatment was administered per treatment protocol scanned into patient chart (see attached).     RTC for CLEVE 6 months, prn any issues with treatment      I have reviewed medications relevant to my specialty.

## 2020-01-10 ENCOUNTER — TELEPHONE (OUTPATIENT)
Dept: DERMATOLOGY | Facility: IMAGING CENTER | Age: 70
End: 2020-01-10

## 2020-01-10 RX ORDER — VALACYCLOVIR HYDROCHLORIDE 1 G/1
TABLET, FILM COATED ORAL
Qty: 17 TAB | Refills: 0 | Status: SHIPPED | OUTPATIENT
Start: 2020-01-10 | End: 2020-04-08

## 2020-01-10 NOTE — TELEPHONE ENCOUNTER
I called pt to see how he is doing one day after his PDT treatment. He said his skin is red and he is using the moisturizer we gave him. He said there is a spot on his lip that hurts. I spoke with Dr. Nicholson and she is sending in valacyclovir for pt. I called pt back and let him know.

## 2020-01-16 ENCOUNTER — HOSPITAL ENCOUNTER (OUTPATIENT)
Dept: LAB | Facility: MEDICAL CENTER | Age: 70
End: 2020-01-16
Attending: INTERNAL MEDICINE
Payer: MEDICARE

## 2020-01-16 DIAGNOSIS — R73.01 IMPAIRED FASTING GLUCOSE: ICD-10-CM

## 2020-01-16 DIAGNOSIS — Z12.5 ENCOUNTER FOR SCREENING FOR MALIGNANT NEOPLASM OF PROSTATE: ICD-10-CM

## 2020-01-16 DIAGNOSIS — R74.01 ELEVATED ALT MEASUREMENT: ICD-10-CM

## 2020-01-16 DIAGNOSIS — E78.00 ELEVATED LDL CHOLESTEROL LEVEL: ICD-10-CM

## 2020-01-16 DIAGNOSIS — R71.8 ELEVATED HEMATOCRIT: ICD-10-CM

## 2020-01-16 DIAGNOSIS — Z11.59 ENCOUNTER FOR HEPATITIS C SCREENING TEST FOR LOW RISK PATIENT: ICD-10-CM

## 2020-01-16 LAB
ALBUMIN SERPL BCP-MCNC: 4.5 G/DL (ref 3.2–4.9)
ALP SERPL-CCNC: 94 U/L (ref 30–99)
ALT SERPL-CCNC: 33 U/L (ref 2–50)
AST SERPL-CCNC: 18 U/L (ref 12–45)
BASOPHILS # BLD AUTO: 0.7 % (ref 0–1.8)
BASOPHILS # BLD: 0.03 K/UL (ref 0–0.12)
BILIRUB CONJ SERPL-MCNC: 0.1 MG/DL (ref 0.1–0.5)
BILIRUB INDIRECT SERPL-MCNC: 0.7 MG/DL (ref 0–1)
BILIRUB SERPL-MCNC: 0.8 MG/DL (ref 0.1–1.5)
CHOLEST SERPL-MCNC: 143 MG/DL (ref 100–199)
EOSINOPHIL # BLD AUTO: 0.04 K/UL (ref 0–0.51)
EOSINOPHIL NFR BLD: 0.9 % (ref 0–6.9)
ERYTHROCYTE [DISTWIDTH] IN BLOOD BY AUTOMATED COUNT: 39 FL (ref 35.9–50)
EST. AVERAGE GLUCOSE BLD GHB EST-MCNC: 114 MG/DL
FASTING STATUS PATIENT QL REPORTED: NORMAL
HBA1C MFR BLD: 5.6 % (ref 0–5.6)
HCT VFR BLD AUTO: 49.2 % (ref 42–52)
HCV AB SER QL: NEGATIVE
HDLC SERPL-MCNC: 41 MG/DL
HGB BLD-MCNC: 16 G/DL (ref 14–18)
IMM GRANULOCYTES # BLD AUTO: 0.01 K/UL (ref 0–0.11)
IMM GRANULOCYTES NFR BLD AUTO: 0.2 % (ref 0–0.9)
LDLC SERPL CALC-MCNC: 79 MG/DL
LYMPHOCYTES # BLD AUTO: 1.48 K/UL (ref 1–4.8)
LYMPHOCYTES NFR BLD: 32.7 % (ref 22–41)
MCH RBC QN AUTO: 29.2 PG (ref 27–33)
MCHC RBC AUTO-ENTMCNC: 32.5 G/DL (ref 33.7–35.3)
MCV RBC AUTO: 89.8 FL (ref 81.4–97.8)
MONOCYTES # BLD AUTO: 0.3 K/UL (ref 0–0.85)
MONOCYTES NFR BLD AUTO: 6.6 % (ref 0–13.4)
NEUTROPHILS # BLD AUTO: 2.67 K/UL (ref 1.82–7.42)
NEUTROPHILS NFR BLD: 58.9 % (ref 44–72)
NRBC # BLD AUTO: 0 K/UL
NRBC BLD-RTO: 0 /100 WBC
PLATELET # BLD AUTO: 256 K/UL (ref 164–446)
PMV BLD AUTO: 9.5 FL (ref 9–12.9)
PROT SERPL-MCNC: 7 G/DL (ref 6–8.2)
PSA SERPL-MCNC: 1.08 NG/ML (ref 0–4)
RBC # BLD AUTO: 5.48 M/UL (ref 4.7–6.1)
TRIGL SERPL-MCNC: 117 MG/DL (ref 0–149)
WBC # BLD AUTO: 4.5 K/UL (ref 4.8–10.8)

## 2020-01-16 PROCEDURE — 85025 COMPLETE CBC W/AUTO DIFF WBC: CPT

## 2020-01-16 PROCEDURE — 84153 ASSAY OF PSA TOTAL: CPT | Mod: GA

## 2020-01-16 PROCEDURE — 80076 HEPATIC FUNCTION PANEL: CPT

## 2020-01-16 PROCEDURE — 36415 COLL VENOUS BLD VENIPUNCTURE: CPT

## 2020-01-16 PROCEDURE — 86803 HEPATITIS C AB TEST: CPT

## 2020-01-16 PROCEDURE — 80061 LIPID PANEL: CPT

## 2020-01-16 PROCEDURE — 83036 HEMOGLOBIN GLYCOSYLATED A1C: CPT | Mod: GA

## 2020-01-21 ENCOUNTER — OFFICE VISIT (OUTPATIENT)
Dept: MEDICAL GROUP | Facility: MEDICAL CENTER | Age: 70
End: 2020-01-21
Payer: MEDICARE

## 2020-01-21 VITALS
TEMPERATURE: 98.3 F | BODY MASS INDEX: 27.62 KG/M2 | SYSTOLIC BLOOD PRESSURE: 126 MMHG | DIASTOLIC BLOOD PRESSURE: 64 MMHG | OXYGEN SATURATION: 96 % | RESPIRATION RATE: 16 BRPM | HEIGHT: 77 IN | HEART RATE: 62 BPM | WEIGHT: 233.91 LBS

## 2020-01-21 DIAGNOSIS — Z91.89 CARDIOVASCULAR EVENT RISK: ICD-10-CM

## 2020-01-21 DIAGNOSIS — L03.213 PERIORBITAL CELLULITIS OF LEFT EYE: ICD-10-CM

## 2020-01-21 DIAGNOSIS — K21.9 GASTROESOPHAGEAL REFLUX DISEASE WITHOUT ESOPHAGITIS: ICD-10-CM

## 2020-01-21 DIAGNOSIS — H10.9 BACTERIAL CONJUNCTIVITIS OF LEFT EYE: ICD-10-CM

## 2020-01-21 PROCEDURE — 99214 OFFICE O/P EST MOD 30 MIN: CPT | Performed by: INTERNAL MEDICINE

## 2020-01-21 RX ORDER — SULFAMETHOXAZOLE AND TRIMETHOPRIM 800; 160 MG/1; MG/1
1 TABLET ORAL 2 TIMES DAILY
Qty: 10 TAB | Refills: 0 | Status: SHIPPED | OUTPATIENT
Start: 2020-01-21 | End: 2020-01-26

## 2020-01-21 RX ORDER — CIPROFLOXACIN HYDROCHLORIDE 3.5 MG/ML
SOLUTION/ DROPS TOPICAL
Qty: 1 BOTTLE | Refills: 0 | Status: SHIPPED | OUTPATIENT
Start: 2020-01-21 | End: 2020-04-08

## 2020-01-21 ASSESSMENT — PATIENT HEALTH QUESTIONNAIRE - PHQ9: CLINICAL INTERPRETATION OF PHQ2 SCORE: 0

## 2020-01-21 NOTE — PROGRESS NOTES
CC:  Diagnoses of Cardiovascular event risk, Periorbital cellulitis of left eye, Bacterial conjunctivitis of left eye, and Gastroesophageal reflux disease without esophagitis were pertinent to this visit.    HISTORY OF THE PRESENT ILLNESS: Patient is a 69 y.o. male. This pleasant patient is here today here for acute issue.    He says 2 to 3 weeks ago he did get new eyeglasses.  After that he developed left eye drainage that was described as purulent and gunky particularly in the morning, erythema of the upper eyelid.  He has no eye pain, no diplopia, no vision change, no headache, no pain with eye movement, no fever.  Not using any medications for this.  Did do hot compresses 3 times a day for at least 3 days without any change.    Incidentally he did call his gastroenterologist and they indicated to him that EGD was no longer needed.  Patient knows to let me know if he does develop any dysphasia, epigastric pain, change in weight loss or other symptoms.  He says his GERD is currently well controlled since he is avoiding alcohol.    We did discuss his risk of cardiovascular disease.  He does not wish to take a statin.  The [facility] calculator did show that with current risk factors 14 out of 100 patients cardiovascular disease, this can decrease to 8 out of 100 with statin.  He prefers behavioral modification.  Has already improved his numbers, most recent lipid panel 1/16/2020 shows total cholesterol 143, triglycerides 117, HDL 41, LDL 79.  Prior , prior total cholesterol 178.    Allergies: Molds & smuts    Current Outpatient Medications Ordered in Epic   Medication Sig Dispense Refill   • sulfamethoxazole-trimethoprim (BACTRIM DS) 800-160 MG tablet Take 1 Tab by mouth 2 times a day for 5 days. 10 Tab 0   • ciprofloxacin (CILOXIN) 0.3 % Solution Solution: Instill 1 to 2 drops into the conjunctival sac every 2 hours while awake for 2 days and 1 to 2 drops every 4 hours while awake for the next 5 days 1  Bottle 0   • valacyclovir (VALTREX) 1 GM Tab Take 2 grams (4 tablets) by mouth today and 12 hours later.  Then start 500mg (1 tab) daily X 9 days 17 Tab 0   • metoprolol (LOPRESSOR) 25 MG Tab Take 12.5 mg by mouth 2 times a day as needed.  3   • valsartan (DIOVAN) 80 MG Tab TAKE 1 TABLET BY MOUTH EVERY DAY 90 Tab 1   • Multiple Vitamins-Minerals (MULTIVITAMIN PO) Take 1 Tab by mouth every morning.     • acetaminophen (TYLENOL) 325 MG Tab Take 325 mg by mouth every four hours as needed for Moderate Pain.     • fluticasone (FLONASE) 50 MCG/ACT nasal spray Spray 1 Spray in nose 1 time daily as needed (ALLERGY).     • apixaban (ELIQUIS) 5mg Tab Take 1 Tab by mouth 2 Times a Day. 60 Tab 4     No current Epic-ordered facility-administered medications on file.        Past Medical History:   Diagnosis Date   • Arrhythmia     Afib   • Cancer (HCC)     Basal cell/ Squamous skin   • Hypertension        Past Surgical History:   Procedure Laterality Date   • OTHER      mohs to nose, back       Social History     Tobacco Use   • Smoking status: Never Smoker   • Smokeless tobacco: Never Used   Substance Use Topics   • Alcohol use: Yes     Comment: 2 glasses wine/night   • Drug use: No       Social History     Patient does not qualify to have social determinant information on file (likely too young).   Social History Narrative   • Not on file       Family History   Problem Relation Age of Onset   • Cancer Mother         bladder non-smoker   • Dementia Father    • Cancer Brother         prostate age 52       ROS:     - Constitutional: Negative for fever, chills    - Eyes: See HPI    - ENT:  Negative for hearing changes, ear pain, ear discharge, rhinorrhea, sinus congestion, sore throat     - Respiratory: Negative for cough, sputum production, chest congestion, dyspnea, wheezing, and crackles.      - Cardiovascular: Negative for chest pain, palpitations, orthopnea, and bilateral lower extremity edema.     - Gastrointestinal: See  "HPI    - Genitourinary: Negative for dysuria    - Musculoskeletal: Negative for myalgias, back pain, and joint pain.     - Skin: Negative for rash, itching, cyanotic skin color change.     - Neurological: Negative for  vertigo    - Endo:Negative for polyuria, heat/cold intolerance, excessive thirst    - Hem/lymphatic: Negative for easy bruising, blood clots, lymphedema, swollen glands    -Allergic/immun: negative for allergic rhinitis    - Psychiatric/Behavioral: Negative for depression, suicidal/homicidal ideation and memory loss.      Exam: /64 (BP Location: Right arm, Patient Position: Sitting, BP Cuff Size: Adult)   Pulse 62   Temp 36.8 °C (98.3 °F) (Temporal)   Resp 16   Ht 1.956 m (6' 5\")   Wt 106.1 kg (233 lb 14.5 oz)   SpO2 96%  Body mass index is 27.74 kg/m².    General: Normal appearing. No distress.  EYES: EOMI, pupils are equal and reactive.  The left superior eyelid medially there is erythema and swelling that is overall relatively localized in approximately 1 cm region.  There is some active purulent eye drainage.   EARS: Normal shape and contour   NOSE, THROAT: nasal mucosa benign. oropharynx is without erythema, edema or exudates.   Neck: Supple without LAD. Thyroid is not enlarged.  Pulmonary: Clear to ausculation.  Normal effort. No rales or wheezing.  Cardiovascular: Regular rate and rhythm without significant murmur.   Neurologic: Cranial nerves grossly nonfocal  Lymph: No cervical, supraclavicular nodes palpable  Skin: Warm and dry.  No obvious lesions.  Musculoskeletal: Normal gait. No extremity cyanosis, clubbing, or edema.  Psych: Normal mood and affect. Alert and oriented x3. Judgment and insight is normal.      Assessment/Plan  1. Cardiovascular event risk  Patient wishes to continue lifestyle modification and does not wish to start statin to decrease risk of cardiovascular disease and this is reasonable.    2. Periorbital cellulitis of left eye  The region of erythema and " swelling is very small, he has no related pain/fever/diplopia or vision change.  I do not feel compelled to double cover him with antibiotics, plan to call him tomorrow to see if the antibiotic is resolving the cellulitis and if needed will provide additional antibiotic coverage.  His infection seemed to happen after his eye exam.  On exam does not appear that the purulence or changes are significant enough to represent a gonococcal or chlamydial infection.  Advised patient that he may continue the warm compresses with antibiotic, and then after a day or 2 if his eyes still having purulent drainage at that time he could start antibiotic eyedrops.  Though hopefully the Bactrim will take care of his condition.  Patient instructed to go to ER if the swelling and redness significantly increased over 24-hour.  If he develops new symptoms.  - sulfamethoxazole-trimethoprim (BACTRIM DS) 800-160 MG tablet; Take 1 Tab by mouth 2 times a day for 5 days.  Dispense: 10 Tab; Refill: 0    3. Bacterial conjunctivitis of left eye  See #2 above  - ciprofloxacin (CILOXIN) 0.3 % Solution; Solution: Instill 1 to 2 drops into the conjunctival sac every 2 hours while awake for 2 days and 1 to 2 drops every 4 hours while awake for the next 5 days  Dispense: 1 Bottle; Refill: 0    4.  GERD  Well controlled, chronic stable issue.  He is avoiding GERD triggers.      Return to clinic as needed    Please note that this dictation was created using voice recognition software. I have made every reasonable attempt to correct obvious errors, but I expect that there are errors of grammar and possibly content that I did not discover before finalizing the note.

## 2020-01-22 DIAGNOSIS — R25.1 TREMOR: ICD-10-CM

## 2020-01-24 ENCOUNTER — OFFICE VISIT (OUTPATIENT)
Dept: DERMATOLOGY | Facility: IMAGING CENTER | Age: 70
End: 2020-01-24
Payer: MEDICARE

## 2020-01-24 DIAGNOSIS — L57.0 ACTINIC KERATOSIS: ICD-10-CM

## 2020-01-24 NOTE — PROGRESS NOTES
CC: PDT treatment f/u    Subjective:Previously seen patient here   Post PDT light treatment states doing well. Has a couple of spots needed to get checked on face.  Had good peeling - much more tolerable than efudex cream used in past.    ROS: no fevers/chills. No itch.    DermPMH: no skin cancer/melanoma  No problem-specific Assessment & Plan notes found for this encounter.    Relevant PMH:BCC 30 years ago hairline , over the last several years has had 5 BCC forehead, nose , face , SCC on back ( 7 years ago)  Social: never smoker    PE: Gen:WDWN male in NAD.  Skin: focal exam: face - few/scant gritty papules on red base scattered on forehead right temple. Less on nose.  Lower face appearing much less affected.    A/P:   PDT Treatment followup for AKs:  -may have residual peeling vs residual AKs, face  -counseled re: options to treat  -will f/u in March 2019 - assess for whether second treatment may be indicated      I have reviewed medications relevant to my specialty.

## 2020-01-27 ENCOUNTER — DOCUMENTATION (OUTPATIENT)
Dept: MEDICAL GROUP | Facility: MEDICAL CENTER | Age: 70
End: 2020-01-27

## 2020-01-27 NOTE — PROGRESS NOTES
Called patient on the phone, he says the cellulitis of his upper eyelid and eye drainage have both resolved.  He will let me know if he has any questions in the future.

## 2020-01-28 ENCOUNTER — PATIENT MESSAGE (OUTPATIENT)
Dept: MEDICAL GROUP | Facility: MEDICAL CENTER | Age: 70
End: 2020-01-28

## 2020-01-28 DIAGNOSIS — H05.012 CELLULITIS OF LEFT ORBITAL REGION: ICD-10-CM

## 2020-01-29 RX ORDER — AMOXICILLIN 875 MG/1
875 TABLET, COATED ORAL 2 TIMES DAILY
Qty: 14 TAB | Refills: 0 | Status: SHIPPED | OUTPATIENT
Start: 2020-01-29 | End: 2020-02-05

## 2020-02-18 ENCOUNTER — TELEPHONE (OUTPATIENT)
Dept: MEDICAL GROUP | Facility: MEDICAL CENTER | Age: 70
End: 2020-02-18

## 2020-02-19 ENCOUNTER — NON-PROVIDER VISIT (OUTPATIENT)
Dept: CARDIOLOGY | Facility: MEDICAL CENTER | Age: 70
End: 2020-02-19
Payer: MEDICARE

## 2020-02-19 VITALS
BODY MASS INDEX: 27.51 KG/M2 | OXYGEN SATURATION: 97 % | SYSTOLIC BLOOD PRESSURE: 130 MMHG | WEIGHT: 233 LBS | HEIGHT: 77 IN | HEART RATE: 52 BPM | DIASTOLIC BLOOD PRESSURE: 72 MMHG

## 2020-02-19 LAB — TREADMILL STRESS: NORMAL

## 2020-02-19 PROCEDURE — 93015 CV STRESS TEST SUPVJ I&R: CPT | Performed by: INTERNAL MEDICINE

## 2020-02-19 NOTE — TELEPHONE ENCOUNTER
1. Caller Name: Gopal Mallory JrMallika                          Call Back Number: [phone]        How would the patient prefer to be contacted with a response: Phone call OK to leave a detailed message      Spoke with pt, Pt states that eye is feeling better. Told pt that if worsens to let us know. Pt understood.     PJ Sempel  Med Ass't

## 2020-03-23 ENCOUNTER — APPOINTMENT (OUTPATIENT)
Dept: MEDICAL GROUP | Facility: MEDICAL CENTER | Age: 70
End: 2020-03-23
Payer: MEDICARE

## 2020-04-08 ENCOUNTER — OFFICE VISIT (OUTPATIENT)
Dept: NEUROLOGY | Facility: MEDICAL CENTER | Age: 70
End: 2020-04-08
Payer: MEDICARE

## 2020-04-08 ENCOUNTER — APPOINTMENT (OUTPATIENT)
Dept: NEUROLOGY | Facility: MEDICAL CENTER | Age: 70
End: 2020-04-08
Payer: MEDICARE

## 2020-04-08 VITALS
TEMPERATURE: 99.5 F | HEIGHT: 77 IN | WEIGHT: 223.77 LBS | DIASTOLIC BLOOD PRESSURE: 90 MMHG | BODY MASS INDEX: 26.42 KG/M2 | SYSTOLIC BLOOD PRESSURE: 134 MMHG | HEART RATE: 64 BPM | RESPIRATION RATE: 15 BRPM | OXYGEN SATURATION: 95 %

## 2020-04-08 DIAGNOSIS — G20.C PRIMARY PARKINSONISM (HCC): Primary | ICD-10-CM

## 2020-04-08 PROCEDURE — 99205 OFFICE O/P NEW HI 60 MIN: CPT | Performed by: PSYCHIATRY & NEUROLOGY

## 2020-04-08 RX ORDER — RASAGILINE 0.5 MG/1
0.5 TABLET ORAL DAILY
Qty: 30 TAB | Refills: 0 | Status: SHIPPED | OUTPATIENT
Start: 2020-04-08 | End: 2020-05-04

## 2020-04-08 RX ORDER — RASAGILINE 1 MG/1
1 TABLET ORAL DAILY
Qty: 30 EACH | Refills: 6 | Status: SHIPPED | OUTPATIENT
Start: 2020-04-08 | End: 2020-10-29

## 2020-04-08 ASSESSMENT — ENCOUNTER SYMPTOMS
LOSS OF CONSCIOUSNESS: 0
WEIGHT LOSS: 0
HALLUCINATIONS: 0
DIZZINESS: 0
MEMORY LOSS: 0
CONSTIPATION: 0
TREMORS: 1
INSOMNIA: 0
HEADACHES: 0
FALLS: 0

## 2020-04-08 ASSESSMENT — FIBROSIS 4 INDEX: FIB4 SCORE: 0.82

## 2020-04-09 NOTE — PROGRESS NOTES
"Subjective:      Gopal Mallory Jr. is a 69 y.o. male who presents from the office of Radha Culp MD, for consultation, with a history of tremor involving the left upper extremity.    TIMUR Herron is a pleasant 69-year-old right-handed gentleman who began to notice a tremor in the left hand and arm beginning maybe 6 months ago.  There was no inciting event following which the tremor became noticeable.  Always worse in the morning, it seems to improve over the course of the day as he is physically active.  He is not clear if it is worsened with activity or when he is at rest.    With this he has noted that the fingers of the left hand feel almost \"stuck together\" at times.  This is especially clear in the morning.  The left arm can become stiffened, and when he walks it does not swing as much and the elbow becomes flexed, the \"sticking is\" of the fingers also occurs.  He then shakes the arm out, pacing attention when he walks, the arm swing returns to normal.    He denies any symptoms of similar nature in the right upper extremity, there is no history of neck pain with radiating symptoms though he does feel a point tenderness behind the left upper arm over the tricep body.  With the stiffness there can be some loss of dexterity in the left hand, none of this exists with the right hand.  His handwriting certainly has not changed.    In general, cognition has been intact.  He denies sleep distortions including REM behavioral disorder, or hallucinations and severe nightmares.  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  His voice volume has been maintained, he has never been told he speaks more softly and it is difficult to be understood.  He does not have problems swallowing but he is does notice occasional drooling out of the left side of his mouth.  Movements in general seem to be normal to him.  He stands easily, has not been falling and does not feel unsteady.  Though he was not aware, and his " wife notes that he is walking more slowly, he acknowledges that the left foot may move more slowly.  Interestingly, when he was in physical therapy for his hand symptoms, they were having him walk more quickly and focused on his stride, they felt that he was walking slower as well.    He denies issues with constipation or urinary retention, orthostatic dizziness and actual syncope, early satiety with nausea and vomiting, etc.    He did undergo MRI imaging of the brain on 2019.  I reviewed the images with the patient, showing minimal nonspecific white matter sclerotic regions consistent with gliosis, ischemia, or demyelination.  There was only minimal generalized atrophy.  There is no evidence of NPH or increased ICP.  TSH, liver and renal profiles were normal.    He has a history of atrial fibrillation and hypertension.  There is no history of diagnosed CVA, CAD, PVD, neurodegenerative disease, tremor, MS, migraine, seizures, autoimmune disease, blood dyscrasia, pulmonary disease, malignancy, thyroid disease, or diabetes.  There is no surgical history of note.    In the family, his father is still alive at 94, his mother  87 with cancer, his brother does have tremor but no diagnosis has been established.  His 3 other siblings are evidently doing well.  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    He is on Diovan 80 mg daily, Eliquis 5 mg twice daily, Flonase and multivitamins.    Review of Systems   Constitutional: Negative for malaise/fatigue and weight loss.   Cardiovascular: Negative for leg swelling.   Gastrointestinal: Negative for constipation.   Genitourinary: Negative for frequency and urgency.   Musculoskeletal: Negative for falls.   Neurological: Positive for tremors. Negative for dizziness, loss of consciousness and headaches.   Psychiatric/Behavioral: Negative for hallucinations and memory loss. The patient  "does not have insomnia.    All other systems reviewed and are negative.       Objective:     /90 (BP Location: Left arm, Patient Position: Sitting, BP Cuff Size: Adult)   Pulse 64   Temp 37.5 °C (99.5 °F) (Temporal)   Resp 15   Ht 1.956 m (6' 5\")   Wt 101.5 kg (223 lb 12.3 oz)   SpO2 95%   BMI 26.53 kg/m²      Physical Exam    He appears in no acute distress.  Vital signs are stable.  He is quite clean and appropriately dressed, very cooperative.  There is no malar rash or sialorrhea.  The neck is supple, range of motion is full.  Cardiac evaluation is unremarkable.  There is no lower extremity edema.    He is fully oriented, there is no aphasia, agnosia, apraxia, or inattention.  There is no evidence of other focal cognitive deficit.    PERRLA/EOMI, glabellar tap is absent, eye blink frequency is slightly diminished, there is no dysarthria, but there is mild hypophonia and even minimal tachyphemia.  Facial movements are still symmetric, sensory exam is intact to temperature and light touch bilaterally.  The tongue and uvula are midline without dysarthria.  Shoulder shrug and head rotation are intact and symmetric.    Musculoskeletal exam reveals a very subtle pill-rolling movement between the thumb and first finger of the left hand.  There is rigidity with the left hand, especially with distraction, some minimal unilateral bradykinesia on the left side in its entirety.  Still, there is no drift or asterixis.  Strength is intact in all 4 extremities.  Reflexes are symmetric though they are diminished at both ankles, a little bit easier to elicit at the knees, symmetric in the upper extremities.  Both toes are downgoing.    He stands easily, there is no hesitancy on gait initiation, the tremor with the left hand is a little bit more prominent, arm swing diminished with the left arm when he walks, but stride length is maintained, he does turn quickly but there is a stiffness in the axial musculatures.  " There is no postural instability.  Repetitive movements are symmetric in the feet, they are a little slower and diminished in amplitude with the left hand when compared to the right.  There is no appendicular dystaxia throughout.    Sensory exam is intact to vibration and temperature, Romberg is absent.     Assessment/Plan:     1. Primary Parkinsonism (HCC)  I suspect this gentleman does have very mild Parkinson's disease, he does have a long history of anosmia, and he has signs on examination that are detected though he is unaware of subjectively.  All of this would be consistent with a parkinsonian syndrome.  I doubt that this is a secondary, symptomatic syndrome related to medication, other medical condition, or hereditary disorders.  Structural pathologies in the brain including ischemia, NPH, vascular anomaly, etc. ruled out.  There is no evidence of brainstem pathology consistent with a Parkinson's-plus syndrome.  He lacks the cognitive symptoms, including hallucinations, RBD behavioral changes, or autonomic symptoms that would be consistent with a multiple system atrophy, Lewy Body Disease, or Cortical Basal Ganglionic Degeneration.    The nature of all of the above was reviewed and full.  He was told directly that my impressions were likely mild Parkinson's disease, but because it is mild, we can wait over time before more aggressive treatments are needed.  I did recommend starting Azilect, and MAO-1b inhibitor that had some data suggesting a potential neuroprotective effect.  Because his symptoms are so mild, it may be difficult to know if the drug is actually helping, but we are also looking for sustained stabilization of his clinical status before a need for additional medication arrives.    Azilect 0.5 mg daily for 1 month, increasing to 1 mg daily.  We will follow-up in 6 months.  Phone contact in the interim.    - Rasagiline Mesylate (AZILECT) 0.5 MG Tab; Take 1 Tab by mouth every day.  Dispense: 30  Tab; Refill: 0  - rasagiline (AZILECT) 1 MG Tab; Take 1 Tab by mouth every day.  Dispense: 30 Each; Refill: 6    Time: 60-minute spent face-to-face for exam, review, discussion, and education, of this over 50% of the time spent counseling and coordinating care.

## 2020-05-06 ENCOUNTER — TELEMEDICINE (OUTPATIENT)
Dept: MEDICAL GROUP | Facility: MEDICAL CENTER | Age: 70
End: 2020-05-06
Payer: MEDICARE

## 2020-05-06 VITALS
WEIGHT: 224 LBS | HEIGHT: 77 IN | SYSTOLIC BLOOD PRESSURE: 148 MMHG | DIASTOLIC BLOOD PRESSURE: 90 MMHG | BODY MASS INDEX: 26.45 KG/M2

## 2020-05-06 DIAGNOSIS — I10 ESSENTIAL HYPERTENSION: ICD-10-CM

## 2020-05-06 DIAGNOSIS — G20.C PRIMARY PARKINSONISM (HCC): ICD-10-CM

## 2020-05-06 DIAGNOSIS — I48.0 PAROXYSMAL ATRIAL FIBRILLATION (HCC): ICD-10-CM

## 2020-05-06 PROBLEM — R74.8 LIVER ENZYME ELEVATION: Status: RESOLVED | Noted: 2019-10-02 | Resolved: 2020-05-06

## 2020-05-06 PROCEDURE — 99214 OFFICE O/P EST MOD 30 MIN: CPT | Mod: 95,CR | Performed by: INTERNAL MEDICINE

## 2020-05-06 ASSESSMENT — FIBROSIS 4 INDEX: FIB4 SCORE: 0.82

## 2020-05-06 NOTE — PROGRESS NOTES
Telemedicine Visit: Established Patient     This encounter was conducted via [employer].   Verbal consent was obtained. Patient's identity was verified.  Subjective:   CC: follow up  Gopal Mallory Jr. is a 69 y.o. male presenting for evaluation and management of:    Neuro note r'd from 4/8/20 dx mild parkinsons and has started Azilect which has been well tolerated.  Continues to have stable L arm tremor.  Ambulating fine, no new associated symptoms.    Since our last appt no new labs.  Had cardiac stress test 2/19/20 12.8 mets, 99% mphr, exercised for 10.02 min and no ischemic change.  Followed by cardiology. Afib has been asymptomatic since October, no sob/cp/palpiations/pnd/orthopnea/leg edema.  No trouble on anticoagulation, no sites of blood loss, etc.  bp at home around 125/80s with HR 50s.  Walking daily for exercise, otherwise staying home due to pandemic.  He is looking forward to bowling again hopefully soon!    ROS   Denies any recent fevers or chills. No nausea or vomiting. No chest pains or shortness of breath.     Allergies   Allergen Reactions   • Molds & Smuts Unspecified     Sneezing       Current medicines (including changes today)  Current Outpatient Medications   Medication Sig Dispense Refill   • Rasagiline Mesylate 0.5 MG Tab TAKE 1 TABLET BY MOUTH EVERY DAY 30 Tab 5   • rasagiline (AZILECT) 1 MG Tab Take 1 Tab by mouth every day. 30 Each 6   • apixaban (ELIQUIS) 5mg Tab Take 1 Tab by mouth 2 Times a Day. 60 Tab 3   • valsartan (DIOVAN) 80 MG Tab TAKE 1 TABLET BY MOUTH EVERY DAY 90 Tab 1   • Multiple Vitamins-Minerals (MULTIVITAMIN PO) Take 1 Tab by mouth every morning.     • fluticasone (FLONASE) 50 MCG/ACT nasal spray Spray 1 Spray in nose 1 time daily as needed (ALLERGY).     • acetaminophen (TYLENOL) 325 MG Tab Take 325 mg by mouth every four hours as needed for Moderate Pain.       No current facility-administered medications for this visit.        Patient Active Problem List     Diagnosis Date Noted   • Primary Parkinsonism (HCC) 04/08/2020   • Gastroesophageal reflux disease without esophagitis 01/21/2020   • Family history of prostate cancer 04/22/2019   • Impaired fasting glucose 03/11/2019   • Paroxysmal atrial fibrillation (HCC) 02/11/2019   • Basal cell carcinoma (BCC) of skin of face 02/11/2019   • Squamous cell carcinoma 02/11/2019   • History of varicocele 02/11/2019   • Essential hypertension 01/15/2019       Family History   Problem Relation Age of Onset   • Cancer Mother         bladder non-smoker   • Dementia Father    • Cancer Brother         prostate age 52       He  has a past medical history of Arrhythmia, Cancer (HCC), and Hypertension.  He  has a past surgical history that includes other.       Objective:   Vitals obtained by patient:  Blood pressure: 125/80, Pulse: 56, Height: 6'5'' and Weight: 224#    Physical Exam:  Constitutional: Alert, no distress, well-groomed.  Skin: No rashes in visible areas.  Eye: Round. Conjunctiva clear, lids normal. No icterus.   ENMT: Lips pink without lesions, good dentition, moist mucous membranes. Phonation normal.  Neck: No masses, no thyromegaly. Moves freely without pain.  CV: Pulse as reported by patient  Respiratory: Unlabored respiratory effort, no cough or audible wheeze  Psych: Alert and oriented x3, normal affect and mood.       Assessment and Plan:   The following treatment plan was discussed:     Medical conditions are chronic, stable and controlled no change in mgmt.  He is asymptomatic and euvolemic by history.  Does not require medication refills at this time.  Not due for annual labs (last in Jan).    1. Primary Parkinsonism (HCC)    2. Essential hypertension    3. Paroxysmal atrial fibrillation (HCC)        Follow-up: Return in about 5 months (around 10/6/2020).

## 2020-06-03 DIAGNOSIS — I10 ESSENTIAL HYPERTENSION: ICD-10-CM

## 2020-06-03 RX ORDER — VALSARTAN 80 MG/1
TABLET ORAL
Qty: 90 TAB | Refills: 0 | Status: SHIPPED | OUTPATIENT
Start: 2020-06-03 | End: 2020-09-22 | Stop reason: SDUPTHER

## 2020-06-03 NOTE — TELEPHONE ENCOUNTER
Received request via: Pharmacy    Was the patient seen in the last year in this department? Yes    Does the patient have an active prescription (recently filled or refills available) for medication(s) requested? No     Requested Prescriptions     Pending Prescriptions Disp Refills   • valsartan (DIOVAN) 80 MG Tab [Pharmacy Med Name: VALSARTAN 80 MG TABLET] 90 Tab 1     Sig: TAKE 1 TABLET BY MOUTH EVERY DAY

## 2020-06-11 ENCOUNTER — OFFICE VISIT (OUTPATIENT)
Dept: DERMATOLOGY | Facility: IMAGING CENTER | Age: 70
End: 2020-06-11
Payer: MEDICARE

## 2020-06-11 DIAGNOSIS — L11.1 GROVER'S DISEASE: ICD-10-CM

## 2020-06-11 DIAGNOSIS — B35.3 TINEA PEDIS, UNSPECIFIED LATERALITY: ICD-10-CM

## 2020-06-11 DIAGNOSIS — Z12.83 SKIN CANCER SCREENING: ICD-10-CM

## 2020-06-11 DIAGNOSIS — L90.8 SKIN AGING: ICD-10-CM

## 2020-06-11 DIAGNOSIS — Z85.828 HX OF NONMELANOMA SKIN CANCER: ICD-10-CM

## 2020-06-11 DIAGNOSIS — L81.4 LENTIGO: ICD-10-CM

## 2020-06-11 DIAGNOSIS — L57.0 ACTINIC KERATOSIS: ICD-10-CM

## 2020-06-11 DIAGNOSIS — L82.1 SEBORRHEIC KERATOSIS: ICD-10-CM

## 2020-06-11 DIAGNOSIS — L85.3 XEROSIS CUTIS: ICD-10-CM

## 2020-06-11 PROCEDURE — 17003 DESTRUCT PREMALG LES 2-14: CPT | Performed by: DERMATOLOGY

## 2020-06-11 PROCEDURE — 99214 OFFICE O/P EST MOD 30 MIN: CPT | Mod: 25 | Performed by: DERMATOLOGY

## 2020-06-11 PROCEDURE — 17000 DESTRUCT PREMALG LESION: CPT | Performed by: DERMATOLOGY

## 2020-06-11 RX ORDER — TRIAMCINOLONE ACETONIDE 1 MG/G
CREAM TOPICAL
Qty: 60 G | Refills: 1 | Status: SHIPPED | OUTPATIENT
Start: 2020-06-11 | End: 2023-01-20 | Stop reason: SDUPTHER

## 2020-06-11 RX ORDER — TRIAMCINOLONE ACETONIDE 1 MG/G
CREAM TOPICAL
Qty: 60 G | Refills: 1 | Status: SHIPPED | OUTPATIENT
Start: 2020-06-11 | End: 2020-06-11 | Stop reason: SDUPTHER

## 2020-06-11 NOTE — PROGRESS NOTES
"CC: 6 month full skin exam.    Subjective: Previously seen patient here for skin check.  Still has some residual AK's on temples and forehead ; s/p PDT 2020.    Rash on abdomen - crusting bumps. Gets red in shower.  occ itch.    From prior notes:  \"History of skin cancer: Yes, Details: BCC 30 years ago hairline , over the last several years has had 5 BCC forehead, nose , face , SCC on back ( 7 years ago)   History of Aks/precancers: yes, many treatments with cryo and efudex, PDT 1/2020  History of blistering/severe sunburns:Yes, Details: during youth  Family history of skin cancer:Yes, Details: father type unknown   Family history of atypical moles:No\"    ROS: no fevers/chills. No itch.  No cough.  DermPMH: mult past NMSC  No problem-specific Assessment & Plan notes found for this encounter.    Relevant PMH: mult med comorbidities  Social: never smoker    PE: Gen:WDWN male in NAD. Skin: Scalp/face/eyes/lips/oral mucosa/conjunctivae/neck/chest/back/arms/legs/hands/feet/buttocks - without suspicious lesions noted.  Genitals exam declined  -many thin gritty papules on face, few on arms  -scar on back, well healed  -crusted papules many small, diffuse, lower abd  -scattered hyperpigmented macules/papules appearing on torso/extremities, appearing benign without suspicious features; some waxy texture  -diffuse xerosis, most notable, lower extremities  -maceration between toes, feet    Path: pigmented SK, back.    A/P: AKs:  -counseled on diagnosis and treatment, including risks/benefits/alternatives of cyrotherapy  -LN2 25 sec X 2 cycles X 11lesions  -f/u if persists at 1 month    Xerosis, diffuse:  -counseled re: dx/tx  -OTC moisturizers recommended    Tinea pedis:  -reviewed lamisil cream use BID X 2-3 weeks    Lentigos/SKs: benign  -reassurance  -reviewed skin cancer detection/prevention    [place]'s dz, abdomen/chest:  -TAC 0.1% cream BID PRN reveiwed with patient    Hx of skin cancer:  -cont'd sunprotection and skin " cancer surveillance  -Q 6mo-annual exam recommended; f/u suspicious lesions PRN    F/u 6 months PDT face and CLEVE  -reviewed PDT information, declined handout.  No known hx of herpes/HSV    I have reviewed medications relevant to my specialty.

## 2020-07-23 ENCOUNTER — OFFICE VISIT (OUTPATIENT)
Dept: DERMATOLOGY | Facility: IMAGING CENTER | Age: 70
End: 2020-07-23
Payer: MEDICARE

## 2020-07-23 VITALS — HEIGHT: 77 IN | WEIGHT: 224 LBS | BODY MASS INDEX: 26.45 KG/M2

## 2020-07-23 DIAGNOSIS — D49.2 NEOPLASM OF SKIN: ICD-10-CM

## 2020-07-23 PROCEDURE — 11104 PUNCH BX SKIN SINGLE LESION: CPT | Performed by: DERMATOLOGY

## 2020-07-23 ASSESSMENT — FIBROSIS 4 INDEX: FIB4 SCORE: 0.82

## 2020-07-23 NOTE — PROGRESS NOTES
CC: Skin Lesion    HPI/location: Left inner thigh  Time present: 1+ mos  Painful lesion: Yes  Itching lesion: Yes  Enlarging lesion: No  Anything make it better or worse? No    PE: focal exam: follicular/folliculocentria?/erythematous papulonodule on left medial thigh 5mm    A/P: Neoplasm NOS: left thigh - folliculitis vs atypia/r-o  -consent for bx, including R/B/A. Cleaned with EtOH, anesthesia with lidocaine 1% + epinephrine, 8mm punch bx, 4-0 Prolene to close  -vaseline/bandage and wound care reviewed  -s/r in 1-2 weeks      I have reviewed medications relevant to my specialty.

## 2020-08-04 ENCOUNTER — APPOINTMENT (OUTPATIENT)
Dept: DERMATOLOGY | Facility: IMAGING CENTER | Age: 70
End: 2020-08-04
Payer: MEDICARE

## 2020-08-04 ENCOUNTER — TELEPHONE (OUTPATIENT)
Dept: DERMATOLOGY | Facility: IMAGING CENTER | Age: 70
End: 2020-08-04

## 2020-09-02 ENCOUNTER — HOSPITAL ENCOUNTER (OUTPATIENT)
Dept: LAB | Facility: MEDICAL CENTER | Age: 70
End: 2020-09-02
Attending: PATHOLOGY
Payer: MEDICARE

## 2020-09-02 LAB
COVID ORDER STATUS COVID19: NORMAL
SARS-COV-2 RNA RESP QL NAA+PROBE: NOTDETECTED
SPECIMEN SOURCE: NORMAL

## 2020-09-02 PROCEDURE — C9803 HOPD COVID-19 SPEC COLLECT: HCPCS

## 2020-09-02 PROCEDURE — U0003 INFECTIOUS AGENT DETECTION BY NUCLEIC ACID (DNA OR RNA); SEVERE ACUTE RESPIRATORY SYNDROME CORONAVIRUS 2 (SARS-COV-2) (CORONAVIRUS DISEASE [COVID-19]), AMPLIFIED PROBE TECHNIQUE, MAKING USE OF HIGH THROUGHPUT TECHNOLOGIES AS DESCRIBED BY CMS-2020-01-R: HCPCS

## 2020-09-22 DIAGNOSIS — I10 ESSENTIAL HYPERTENSION: ICD-10-CM

## 2020-09-22 RX ORDER — VALSARTAN 80 MG/1
80 TABLET ORAL
Qty: 90 TAB | Refills: 0 | Status: SHIPPED | OUTPATIENT
Start: 2020-09-22 | End: 2020-11-02

## 2020-10-02 ENCOUNTER — OFFICE VISIT (OUTPATIENT)
Dept: NEUROLOGY | Facility: MEDICAL CENTER | Age: 70
End: 2020-10-02
Payer: MEDICARE

## 2020-10-02 VITALS
TEMPERATURE: 97.4 F | HEIGHT: 77 IN | DIASTOLIC BLOOD PRESSURE: 62 MMHG | HEART RATE: 60 BPM | BODY MASS INDEX: 26.47 KG/M2 | OXYGEN SATURATION: 96 % | WEIGHT: 224.21 LBS | SYSTOLIC BLOOD PRESSURE: 114 MMHG

## 2020-10-02 DIAGNOSIS — G20.C PRIMARY PARKINSONISM (HCC): Primary | ICD-10-CM

## 2020-10-02 PROCEDURE — 99214 OFFICE O/P EST MOD 30 MIN: CPT | Performed by: PSYCHIATRY & NEUROLOGY

## 2020-10-02 ASSESSMENT — ENCOUNTER SYMPTOMS
TREMORS: 1
MEMORY LOSS: 0
HALLUCINATIONS: 0
INSOMNIA: 0
FALLS: 0
CONSTIPATION: 0

## 2020-10-02 ASSESSMENT — FIBROSIS 4 INDEX: FIB4 SCORE: 0.83

## 2020-10-02 NOTE — PROGRESS NOTES
"Subjective:      Gopal Mallory Jr. is a 70 y.o. male who presents for follow-up, with a history of asymmetric left-sided Parkinson's disease.    HPI    Last seen in April of this year, he was started on Azilect 1 mg per morning, he is tolerating the drug well, he thinks that things might of gotten a little bit better.  They certainly have not worsened.  There is still the \"weakness\" with the left hand, some loss of dexterity remains.  The tremor that he has probably is gotten a little better though it does fluctuate depending on circumstance.  His walking is undisturbed, though the left leg can get a little heavy if he is not cautious.  He can drool a little bit, a little more noticeable when he is asleep.  Voice volume is softened but still he pronounces and enunciates clearly.  Swallowing itself is normal.    He continues to deny any issues with cognitive function, emotional state, bowel and bladder function, etc.  He has not been falling.  From day-to-day things are fairly consistent other than some fluctuations as mentioned above.  There are no dyskinesias.  He sleeps well.  He certainly has had no problems with freezing or on/off episodes.    Medical, surgical and family histories are reviewed, there are no new drug allergies.  Other than the Azilect 1 mg every day, he is still on Eliquis 5 mg, twice daily, Diovan and a multivitamin.    Review of Systems   Constitutional: Negative for malaise/fatigue.   Gastrointestinal: Negative for constipation.   Genitourinary: Negative for frequency.   Musculoskeletal: Negative for falls.   Neurological: Positive for tremors.   Psychiatric/Behavioral: Negative for hallucinations and memory loss. The patient does not have insomnia.    All other systems reviewed and are negative.       Objective:     /62 (BP Location: Right arm, Patient Position: Sitting, BP Cuff Size: Adult)   Pulse 60   Temp 36.3 °C (97.4 °F) (Temporal)   Ht 1.956 m (6' 5\")   Wt " 101.7 kg (224 lb 3.3 oz)   SpO2 96%   BMI 26.59 kg/m²      Physical Exam    He appears in no acute distress.  Vital signs are stable.  There is no malar rash or sialorrhea.  The neck is supple, range of motion is full.  Cardiac evaluation reveals a regular rhythm.    He is fully oriented, there is no aphasia or inattention.    Eye blink frequency is still diminished, PERRLA/EOMI and visual fields are full.  There is still some hypophonia and mild tachyphemia.  There is no dysarthria, the tongue and uvula are midline.  Sensory exam is intact to light touch.  Shoulder shrug and head rotation are intact.  Glabellar tap is absent.    There is no resting tremor on today's exam, the rigidity with the left upper extremity may be slightly diminished when compared to his initial examination.  Strength and tone are intact on the right, strength is intact on the left.  Bradykinesia is mild.    He stands easily, the left arm swing is diminished when compared to the right but there is no tremor.  Stride length is maintained bilaterally, he still turns en bloc.  There is no obvious postural instability.  There is no appendicular dystaxia, repetitive movements are only slightly diminished and slowed in the left hand when compared to the right.  They are symmetric in the feet.     Assessment/Plan:     1. Primary Parkinsonism (HCC)  He is doing well on his present regimen, I do not think there is really need to add something else at this time.  Thus, Azilect 1 mg will be continued every morning.  He feels comfortable with this.  He was reassured that symptoms will fluctuate from day today, for him they are minimal, but this does not represent a clear sign of disease progression.      We did talk at length about what the likelihood is of adding medication, when, and what medicines might prove most beneficial.  Dopamine agonists are probably the next drug class I would consider, best effective and tolerated in mild disease.   "Dopamine can be used at any time, the drug always giving the best \"bang for the nolen\".  The only issues are beginning of cyclical responsiveness as the disease worsens.  He was told the drugs do lose some benefit but this is mostly the results of disease worsening and the need for more dosing to maintain the same degree of functional quality of life.    Fortunately he has very few other nonmotor symptoms that can be part of the disease.  He is independent.  We will follow-up in 1 year, he knows he can call the office earlier if there is a need.    Time: 25-minute spent face-to-face for exam, review, discussion, and education, of this over 50% of the time spent counseling and coordinating care.    "

## 2020-10-05 ENCOUNTER — APPOINTMENT (OUTPATIENT)
Dept: MEDICAL GROUP | Facility: MEDICAL CENTER | Age: 70
End: 2020-10-05
Payer: OTHER GOVERNMENT

## 2020-10-28 DIAGNOSIS — I48.0 PAROXYSMAL ATRIAL FIBRILLATION (HCC): ICD-10-CM

## 2020-10-28 NOTE — TELEPHONE ENCOUNTER
Received request via: Pharmacy    Was the patient seen in the last year in this department? Yes    Does the patient have an active prescription (recently filled or refills available) for medication(s) requested? No     LOV 1/21/2020

## 2020-10-29 DIAGNOSIS — I10 ESSENTIAL HYPERTENSION: ICD-10-CM

## 2020-10-29 DIAGNOSIS — G20.C PRIMARY PARKINSONISM (HCC): ICD-10-CM

## 2020-10-29 RX ORDER — RASAGILINE 1 MG/1
TABLET ORAL
Qty: 90 TAB | Refills: 2 | Status: SHIPPED | OUTPATIENT
Start: 2020-10-29 | End: 2021-08-02 | Stop reason: SDUPTHER

## 2020-10-29 NOTE — TELEPHONE ENCOUNTER
Received request via: Pharmacy    Was the patient seen in the last year in this department? Yes    Does the patient have an active prescription (recently filled or refills available) for medication(s) requested? No     Requested Prescriptions     Pending Prescriptions Disp Refills   • valsartan (DIOVAN) 80 MG Tab [Pharmacy Med Name: VALSARTAN 80 MG TABLET] 90 Tab 0     Sig: TAKE 1 TABLET BY MOUTH EVERY DAY

## 2020-11-02 RX ORDER — VALSARTAN 80 MG/1
TABLET ORAL
Qty: 90 TAB | Refills: 0 | Status: SHIPPED | OUTPATIENT
Start: 2020-11-02 | End: 2021-01-27

## 2020-11-02 RX ORDER — APIXABAN 5 MG/1
TABLET, FILM COATED ORAL
Qty: 60 TAB | Refills: 3 | Status: SHIPPED | OUTPATIENT
Start: 2020-11-02 | End: 2021-02-26

## 2020-11-02 NOTE — TELEPHONE ENCOUNTER
Please have patient schedule appointment with me. I Do not see any labs since last January.  Need to have updated labs for drug safety monitoring and he would benefit from an appointment to determine all the labs that he would benefit from.  Please let him know.

## 2020-11-03 ENCOUNTER — ANTICOAGULATION MONITORING (OUTPATIENT)
Dept: VASCULAR LAB | Facility: MEDICAL CENTER | Age: 70
End: 2020-11-03

## 2020-11-03 NOTE — PROGRESS NOTES
I Left a voice message with the patient to call the [facility] to establish care.     On Eliquis for atrial fibrillation.      Gopal Cameronpatt MarianoMohamud Jr.  [address] [place] [address]      PCP:  Radha Culp M.D.  [address]  [place] [id number] [phone]    Braeden Berg, PharmD, MS, BCACP, LCC    [facility]  Phone [phone] fax [phone]    This note was created using voice recognition software (Dragon). The accuracy of the dictation is limited by the abilities of the software. I have reviewed the note prior to signing, however some errors in grammar and context are still possible. If you have any questions related to this note please do not hesitate to contact our office.

## 2020-11-04 ENCOUNTER — TELEPHONE (OUTPATIENT)
Dept: VASCULAR LAB | Facility: MEDICAL CENTER | Age: 70
End: 2020-11-04

## 2020-11-04 NOTE — TELEPHONE ENCOUNTER
LVM for pt to call back to schedule initial visit with anticoagulation clinic. Pt was curious of why our office called expressed in a voicemail. In the VM I left pt, I told him his PCP put in a referral.

## 2020-11-05 ENCOUNTER — OFFICE VISIT (OUTPATIENT)
Dept: CARDIOLOGY | Facility: MEDICAL CENTER | Age: 70
End: 2020-11-05
Payer: MEDICARE

## 2020-11-05 VITALS
BODY MASS INDEX: 27.16 KG/M2 | HEART RATE: 56 BPM | WEIGHT: 230 LBS | OXYGEN SATURATION: 98 % | HEIGHT: 77 IN | SYSTOLIC BLOOD PRESSURE: 130 MMHG | RESPIRATION RATE: 16 BRPM | DIASTOLIC BLOOD PRESSURE: 80 MMHG

## 2020-11-05 DIAGNOSIS — I48.0 PAROXYSMAL ATRIAL FIBRILLATION (HCC): ICD-10-CM

## 2020-11-05 DIAGNOSIS — I10 ESSENTIAL HYPERTENSION: ICD-10-CM

## 2020-11-05 PROCEDURE — 99214 OFFICE O/P EST MOD 30 MIN: CPT | Performed by: INTERNAL MEDICINE

## 2020-11-05 ASSESSMENT — FIBROSIS 4 INDEX: FIB4 SCORE: 0.83

## 2020-11-05 NOTE — PROGRESS NOTES
"CARDIOLOGY OUTPATIENT FOLLOWUP    PCP: Radha Culp M.D.    1. Paroxysmal atrial fibrillation (HCC)  No recurrence of symptoms, tolerating Eliquis.  Abnormal nocturnal oximetry last year, but given the well-controlled A. fib, hypertension and lack of daytime somnolence I did not recommend formal sleep evaluation at this point.  -Continue Eliquis  -Basic metabolic panel ordered    2. Essential hypertension  Well-controlled.  No changes to valsartan  -Basic metabolic panel ordered      Follow up with Hira Starr M.D. in 1 year    Chief Complaint   Patient presents with   • Atrial Fibrillation       History: Gopal Mallory Jr. is a 70 y.o. male with a past medical history of Primary parkinsonism and hypertension presenting for follow up of paroxysmal atrial fibrillation.  Since her last evaluation he completed a treadmill stress ECG which was overall low risk but did show very transient ST segment depression.  Additionally, nocturnal oximetry suggested the presence of occult sleep apnea.  Despite this, he has not had any recurrence of atrial fibrillation and reports sleeping great at night.  He does not experience daytime somnolence.  Blood pressures been well controlled with average readings around 120-130 systolic.  He is tolerating Eliquis without side effect.    ROS:  All other systems reviewed and negative except as per the HPI    PE:  /80 (BP Location: Right arm, Patient Position: Sitting, BP Cuff Size: Adult)   Pulse (!) 56   Resp 16   Ht 1.956 m (6' 5\")   Wt 104.3 kg (230 lb)   SpO2 98%   BMI 27.27 kg/m²   Gen: Well appearing  HEENT: Symmetric face. Anicteric sclerae. Moist mucus membranes  NECK: No JVD. No lymphadenopathy  CARDIAC: Normal PMI, regular, normal S1, S2. without murmur  VASCULATURE: Normal carotid amplitude without bruit.   RESP: Clear to auscultation bilaterally  ABD: Soft, non-tender, non-distended  EXT: No edema, no clubbing or cyanosis  SKIN: Warm and " dry  NEURO: No gross deficits  PSYCH: Appropriate affect, participates in conversation    Past Medical History:   Diagnosis Date   • Arrhythmia     Afib   • Cancer (HCC)     Basal cell/ Squamous skin   • Hypertension      Allergies   Allergen Reactions   • Molds & Smuts Unspecified     Sneezing     Outpatient Encounter Medications as of 11/5/2020   Medication Sig Dispense Refill   • ELIQUIS 5 MG Tab TAKE 1 TABLET BY MOUTH TWICE A DAY 60 Tab 3   • valsartan (DIOVAN) 80 MG Tab TAKE 1 TABLET BY MOUTH EVERY DAY 90 Tab 0   • rasagiline (AZILECT) 1 MG Tab TAKE 1 TABLET BY MOUTH EVERY DAY 90 Tab 2   • triamcinolone acetonide (KENALOG) 0.1 % Cream AAA abdomen BID PRN rash.  Avoid use on sensitive sites.  If use on face, nose; limit to sparing amount 2X/week 60 g 1   • Multiple Vitamins-Minerals (MULTIVITAMIN PO) Take 1 Tab by mouth every morning.     • acetaminophen (TYLENOL) 325 MG Tab Take 325 mg by mouth every four hours as needed for Moderate Pain.     • fluticasone (FLONASE) 50 MCG/ACT nasal spray Spray 1 Spray in nose 1 time daily as needed (ALLERGY).     • [DISCONTINUED] Aspirin Buf,CaCarb-MgCarb-MgO, 81 MG Tab Take 81 mg by mouth every day.       No facility-administered encounter medications on file as of 11/5/2020.      Social History     Socioeconomic History   • Marital status:      Spouse name: Not on file   • Number of children: Not on file   • Years of education: Not on file   • Highest education level: Not on file   Occupational History   • Not on file   Social Needs   • Financial resource strain: Not on file   • Food insecurity     Worry: Not on file     Inability: Not on file   • Transportation needs     Medical: Not on file     Non-medical: Not on file   Tobacco Use   • Smoking status: Never Smoker   • Smokeless tobacco: Never Used   Substance and Sexual Activity   • Alcohol use: Yes     Comment: 2 glasses wine/night   • Drug use: No   • Sexual activity: Yes     Partners: Female   Lifestyle   •  Physical activity     Days per week: Not on file     Minutes per session: Not on file   • Stress: Not on file   Relationships   • Social connections     Talks on phone: Not on file     Gets together: Not on file     Attends Shinto service: Not on file     Active member of club or organization: Not on file     Attends meetings of clubs or organizations: Not on file     Relationship status: Not on file   • Intimate partner violence     Fear of current or ex partner: Not on file     Emotionally abused: Not on file     Physically abused: Not on file     Forced sexual activity: Not on file   Other Topics Concern   • Not on file   Social History Narrative   • Not on file       Studies  Lab Results   Component Value Date/Time    CHOLSTRLTOT 143 01/16/2020 08:52 AM    LDL 79 01/16/2020 08:52 AM    HDL 41 01/16/2020 08:52 AM    TRIGLYCERIDE 117 01/16/2020 08:52 AM       Lab Results   Component Value Date/Time    SODIUM 138 10/02/2019 08:00 AM    POTASSIUM 4.2 10/02/2019 08:00 AM    CHLORIDE 101 10/02/2019 08:00 AM    CO2 22 10/02/2019 08:00 AM    GLUCOSE 138 (H) 10/02/2019 08:00 AM    BUN 16 10/02/2019 08:00 AM    CREATININE 1.12 10/02/2019 08:00 AM     Lab Results   Component Value Date/Time    ALKPHOSPHAT 94 01/16/2020 08:52 AM    ASTSGOT 18 01/16/2020 08:52 AM    ALTSGPT 33 01/16/2020 08:52 AM    TBILIRUBIN 0.8 01/16/2020 08:52 AM        For this encounter I directly reviewed ECG tracings and medical records I agree with the interpretations in the electronic health record

## 2020-11-12 ENCOUNTER — NON-PROVIDER VISIT (OUTPATIENT)
Dept: URGENT CARE | Facility: CLINIC | Age: 70
End: 2020-11-12
Payer: MEDICARE

## 2020-11-12 DIAGNOSIS — Z23 NEED FOR VACCINATION: ICD-10-CM

## 2020-11-12 PROCEDURE — G0008 ADMIN INFLUENZA VIRUS VAC: HCPCS | Performed by: NURSE PRACTITIONER

## 2020-11-12 PROCEDURE — 90662 IIV NO PRSV INCREASED AG IM: CPT | Performed by: NURSE PRACTITIONER

## 2020-11-13 NOTE — PROGRESS NOTES
"Gopal Mallory Jr. is a 70 y.o. male here for a non-provider visit for:   FLU    Reason for immunization: Annual Flu Vaccine  Immunization records indicate need for vaccine: Yes, confirmed with Epic  Minimum interval has been met for this vaccine: Yes  ABN completed: Not Indicated    Order and dose verified by: MARIA ISABEL  VIS Dated  8/2019  was given to patient: No - declined  All IAC Questionnaire questions were answered \"No.\"    Patient tolerated injection and no adverse effects were observed or reported: Yes    Pt scheduled for next dose in series: Not Indicated    "

## 2020-11-17 ENCOUNTER — TELEPHONE (OUTPATIENT)
Dept: VASCULAR LAB | Facility: MEDICAL CENTER | Age: 70
End: 2020-11-17

## 2020-11-17 NOTE — TELEPHONE ENCOUNTER
[facility]    Pt missed appt on 11/13.  Left VM with pt requesting a call back to make next appointment.    David Berman, PharmD

## 2020-11-20 ENCOUNTER — PATIENT MESSAGE (OUTPATIENT)
Dept: MEDICAL GROUP | Facility: MEDICAL CENTER | Age: 70
End: 2020-11-20

## 2020-12-16 ENCOUNTER — TELEMEDICINE (OUTPATIENT)
Dept: MEDICAL GROUP | Facility: MEDICAL CENTER | Age: 70
End: 2020-12-16
Payer: MEDICARE

## 2020-12-16 VITALS — WEIGHT: 224 LBS | HEIGHT: 77 IN | BODY MASS INDEX: 26.45 KG/M2 | RESPIRATION RATE: 16 BRPM

## 2020-12-16 DIAGNOSIS — K21.9 GASTROESOPHAGEAL REFLUX DISEASE WITHOUT ESOPHAGITIS: ICD-10-CM

## 2020-12-16 DIAGNOSIS — R73.01 IMPAIRED FASTING GLUCOSE: ICD-10-CM

## 2020-12-16 DIAGNOSIS — I10 ESSENTIAL HYPERTENSION: ICD-10-CM

## 2020-12-16 DIAGNOSIS — G20.C PRIMARY PARKINSONISM (HCC): ICD-10-CM

## 2020-12-16 DIAGNOSIS — I48.0 PAROXYSMAL ATRIAL FIBRILLATION (HCC): ICD-10-CM

## 2020-12-16 DIAGNOSIS — Z12.5 ENCOUNTER FOR SCREENING FOR MALIGNANT NEOPLASM OF PROSTATE: ICD-10-CM

## 2020-12-16 PROCEDURE — 99214 OFFICE O/P EST MOD 30 MIN: CPT | Mod: 95,CR | Performed by: INTERNAL MEDICINE

## 2020-12-16 RX ORDER — OMEPRAZOLE 20 MG/1
20 CAPSULE, DELAYED RELEASE ORAL DAILY
Qty: 30 CAP | Refills: 6 | Status: SHIPPED | OUTPATIENT
Start: 2020-12-16 | End: 2021-05-17

## 2020-12-16 ASSESSMENT — FIBROSIS 4 INDEX: FIB4 SCORE: 0.83

## 2020-12-16 NOTE — PROGRESS NOTES
Telemedicine: Established Patient   This evaluation was conducted via Zoom using secure and encrypted videoconferencing technology. The patient was in a private location in the state of [place].    The patient's identity was confirmed and verbal consent was obtained for this virtual visit.    Subjective:   CC:   Chief Complaint   Patient presents with   • Requesting Labs   • Follow-Up       Gopal Mallory Jr. is a 70 y.o. male presenting for evaluation and management of:    Gopal says he feels well overall he bought a rowing machine and is doing this 30 minutes a day.  With his Parkinson's disease his neurology note 10/2/2020 was briefly reviewed, indicated to continue with Azilet.  Gopal reports primarily his Parkinson symptoms that are bothersome are some morning stiffness and tremor that seems to get little better during the day.    He says he feels his GERD predated his Parkinson's.  Having more symptoms now.  We discussed GERD foods and behaviors to avoid.  Currently he is using Tums as needed with benefit.  He used to be on H2 blocker but it was recalled due to drug safety.  He is willing to try PPI therapy.  He will let me know if his swallowing function ever changes in a way that he is aspirating as he is aware that this can sometimes be affected with Parkinson's disease but this time does not need speech therapy.    History of atrial fibrillation he is on anticoagulation.  Cardiology note 11/5/2020 briefly reviewed.  He tells me his blood pressure is well controlled at home generally 120/80, with controlled heart rate as well.    Will be due for annual labs in January he wishes to proceed with this.    ROS  No chest pain, no abdominal pain, no fever/chills or other new symptoms.    Allergies   Allergen Reactions   • Molds & Smuts Unspecified     Sneezing       Current medicines (including changes today)  Current Outpatient Medications   Medication Sig Dispense Refill   • ELIQUIS 5 MG Tab TAKE 1  "TABLET BY MOUTH TWICE A DAY 60 Tab 3   • valsartan (DIOVAN) 80 MG Tab TAKE 1 TABLET BY MOUTH EVERY DAY 90 Tab 0   • rasagiline (AZILECT) 1 MG Tab TAKE 1 TABLET BY MOUTH EVERY DAY 90 Tab 2   • triamcinolone acetonide (KENALOG) 0.1 % Cream AAA abdomen BID PRN rash.  Avoid use on sensitive sites.  If use on face, nose; limit to sparing amount 2X/week 60 g 1   • Multiple Vitamins-Minerals (MULTIVITAMIN PO) Take 1 Tab by mouth every morning.     • acetaminophen (TYLENOL) 325 MG Tab Take 325 mg by mouth every four hours as needed for Moderate Pain.     • fluticasone (FLONASE) 50 MCG/ACT nasal spray Spray 1 Spray in nose 1 time daily as needed (ALLERGY).       No current facility-administered medications for this visit.        Patient Active Problem List    Diagnosis Date Noted   • Primary Parkinsonism (HCC) 04/08/2020   • Gastroesophageal reflux disease without esophagitis 01/21/2020   • Family history of prostate cancer 04/22/2019   • Impaired fasting glucose 03/11/2019   • Paroxysmal atrial fibrillation (HCC) 02/11/2019   • Basal cell carcinoma (BCC) of skin of face 02/11/2019   • Squamous cell carcinoma 02/11/2019   • History of varicocele 02/11/2019   • Essential hypertension 01/15/2019       Family History   Problem Relation Age of Onset   • Cancer Mother         bladder non-smoker   • Dementia Father    • Cancer Brother         prostate age 52       He  has a past medical history of Arrhythmia, Cancer (HCC), and Hypertension.  He  has a past surgical history that includes other.       Objective:   Resp 16   Ht 1.956 m (6' 5\")   Wt 101.6 kg (224 lb)   BMI 26.56 kg/m²     Physical Exam  Constitutional: Alert, no distress, well-groomed.  Skin: No rashes in visible areas.  Eye: Round. Conjunctiva clear, lids normal. No icterus.   ENMT: Lips pink without lesions, good dentition, moist mucous membranes. Phonation normal.  Neck: No masses, no thyromegaly. Moves freely without pain.  CV: Pulse as reported by " patient  Respiratory: Unlabored respiratory effort, no cough or audible wheeze  Psych: Alert and oriented x3, normal affect and mood.   He did show me his fancy new rowing machine in the background, it was very nice    Assessment and Plan:   The following treatment plan was discussed:     1. Essential hypertension  - CBC WITHOUT DIFFERENTIAL; Future  - Comp Metabolic Panel; Future  - Lipid Profile; Future    2. Paroxysmal atrial fibrillation (HCC)  - CBC WITHOUT DIFFERENTIAL; Future  - Comp Metabolic Panel; Future  - Lipid Profile; Future    3. Impaired fasting glucose  - HEMOGLOBIN A1C; Future  - CBC WITHOUT DIFFERENTIAL; Future  - Lipid Profile; Future    4. Encounter for screening for malignant neoplasm of prostate  - PROSTATE SPECIFIC AG SCREENING; Future    5. Primary Parkinsonism (HCC)    His medical conditions are all chronic, stable and well controlled, plan to continue medical management.  Trial of omeprazole 30 minutes prior to breakfast to see if this better controls GERD symptoms.  Will monitor for any aspiration and patient aware that sometimes Parkinson's can affect swallowing function.  He will do his annual labs, he prefers 6-month follow-up as long as labs are normal and this is reasonable.    Follow-up: Months per patient request

## 2021-02-01 ENCOUNTER — HOSPITAL ENCOUNTER (OUTPATIENT)
Dept: LAB | Facility: MEDICAL CENTER | Age: 71
End: 2021-02-01
Attending: INTERNAL MEDICINE
Payer: MEDICARE

## 2021-02-01 DIAGNOSIS — R73.01 IMPAIRED FASTING GLUCOSE: ICD-10-CM

## 2021-02-01 DIAGNOSIS — I10 ESSENTIAL HYPERTENSION: ICD-10-CM

## 2021-02-01 DIAGNOSIS — I48.0 PAROXYSMAL ATRIAL FIBRILLATION (HCC): ICD-10-CM

## 2021-02-01 DIAGNOSIS — Z12.5 ENCOUNTER FOR SCREENING FOR MALIGNANT NEOPLASM OF PROSTATE: ICD-10-CM

## 2021-02-01 LAB
ALBUMIN SERPL BCP-MCNC: 4.4 G/DL (ref 3.2–4.9)
ALBUMIN/GLOB SERPL: 1.6 G/DL
ALP SERPL-CCNC: 81 U/L (ref 30–99)
ALT SERPL-CCNC: 36 U/L (ref 2–50)
ANION GAP SERPL CALC-SCNC: 9 MMOL/L (ref 7–16)
AST SERPL-CCNC: 21 U/L (ref 12–45)
BILIRUB SERPL-MCNC: 1 MG/DL (ref 0.1–1.5)
BUN SERPL-MCNC: 19 MG/DL (ref 8–22)
CALCIUM SERPL-MCNC: 9 MG/DL (ref 8.4–10.2)
CHLORIDE SERPL-SCNC: 100 MMOL/L (ref 96–112)
CHOLEST SERPL-MCNC: 148 MG/DL (ref 100–199)
CO2 SERPL-SCNC: 28 MMOL/L (ref 20–33)
CREAT SERPL-MCNC: 0.87 MG/DL (ref 0.5–1.4)
ERYTHROCYTE [DISTWIDTH] IN BLOOD BY AUTOMATED COUNT: 40 FL (ref 35.9–50)
FASTING STATUS PATIENT QL REPORTED: NORMAL
GLOBULIN SER CALC-MCNC: 2.8 G/DL (ref 1.9–3.5)
GLUCOSE SERPL-MCNC: 97 MG/DL (ref 65–99)
HCT VFR BLD AUTO: 46.8 % (ref 42–52)
HDLC SERPL-MCNC: 49 MG/DL
HGB BLD-MCNC: 15.6 G/DL (ref 14–18)
LDLC SERPL CALC-MCNC: 84 MG/DL
MCH RBC QN AUTO: 29.9 PG (ref 27–33)
MCHC RBC AUTO-ENTMCNC: 33.3 G/DL (ref 33.7–35.3)
MCV RBC AUTO: 89.8 FL (ref 81.4–97.8)
PLATELET # BLD AUTO: 164 K/UL (ref 164–446)
PMV BLD AUTO: 8.8 FL (ref 9–12.9)
POTASSIUM SERPL-SCNC: 4.3 MMOL/L (ref 3.6–5.5)
PROT SERPL-MCNC: 7.2 G/DL (ref 6–8.2)
RBC # BLD AUTO: 5.21 M/UL (ref 4.7–6.1)
SODIUM SERPL-SCNC: 137 MMOL/L (ref 135–145)
TRIGL SERPL-MCNC: 76 MG/DL (ref 0–149)
WBC # BLD AUTO: 4.3 K/UL (ref 4.8–10.8)

## 2021-02-01 PROCEDURE — 80053 COMPREHEN METABOLIC PANEL: CPT

## 2021-02-01 PROCEDURE — 85027 COMPLETE CBC AUTOMATED: CPT

## 2021-02-01 PROCEDURE — 80061 LIPID PANEL: CPT

## 2021-02-01 PROCEDURE — 36415 COLL VENOUS BLD VENIPUNCTURE: CPT | Mod: GA

## 2021-02-01 PROCEDURE — 83036 HEMOGLOBIN GLYCOSYLATED A1C: CPT | Mod: GA

## 2021-02-01 PROCEDURE — 84153 ASSAY OF PSA TOTAL: CPT | Mod: GA

## 2021-02-02 LAB
EST. AVERAGE GLUCOSE BLD GHB EST-MCNC: 111 MG/DL
HBA1C MFR BLD: 5.5 % (ref 0–5.6)
PSA SERPL-MCNC: 1.14 NG/ML (ref 0–4)

## 2021-02-05 ENCOUNTER — OFFICE VISIT (OUTPATIENT)
Dept: DERMATOLOGY | Facility: IMAGING CENTER | Age: 71
End: 2021-02-05
Payer: MEDICARE

## 2021-02-05 DIAGNOSIS — L81.4 LENTIGO: ICD-10-CM

## 2021-02-05 DIAGNOSIS — Z85.828 HX OF NONMELANOMA SKIN CANCER: ICD-10-CM

## 2021-02-05 DIAGNOSIS — Z12.83 SKIN CANCER SCREENING: ICD-10-CM

## 2021-02-05 DIAGNOSIS — L90.8 SKIN AGING: ICD-10-CM

## 2021-02-05 DIAGNOSIS — L11.1 GROVER'S DISEASE: ICD-10-CM

## 2021-02-05 DIAGNOSIS — L21.9 SEBORRHEA: ICD-10-CM

## 2021-02-05 DIAGNOSIS — D22.9 NEVUS: ICD-10-CM

## 2021-02-05 DIAGNOSIS — L85.3 XEROSIS OF SKIN: ICD-10-CM

## 2021-02-05 DIAGNOSIS — L57.0 ACTINIC KERATOSIS: ICD-10-CM

## 2021-02-05 DIAGNOSIS — L82.1 SEBORRHEIC KERATOSIS: ICD-10-CM

## 2021-02-05 PROCEDURE — 17000 DESTRUCT PREMALG LESION: CPT | Performed by: DERMATOLOGY

## 2021-02-05 PROCEDURE — 99214 OFFICE O/P EST MOD 30 MIN: CPT | Mod: 25 | Performed by: DERMATOLOGY

## 2021-02-05 PROCEDURE — 17003 DESTRUCT PREMALG LES 2-14: CPT | Performed by: DERMATOLOGY

## 2021-02-05 RX ORDER — FLUOROURACIL 50 MG/G
CREAM TOPICAL
Qty: 40 G | Refills: 1 | Status: SHIPPED | OUTPATIENT
Start: 2021-02-05 | End: 2021-09-22

## 2021-02-05 NOTE — PROGRESS NOTES
"CC: Follow up 6 months CLEVE     Subjective: Previously seen patient here for skin check.  Still has some residual AK's on temples and forehead ; s/p PDT 2020.    Hx of BCC on nose few years back, has noticed slight irritation and some redness tip of nose     Rash on abdomen - crusting bumps. Gets red in shower.  occ itch.  Uses prescribed cream and/or moisturizer which worked equally well.  Doesn't use often/always.    From prior notes:  \"History of skin cancer: Yes, Details: BCC 30 years ago hairline , over the last several years has had 5 BCC forehead, nose , face , SCC on back ( 7 years ago)   History of Aks/precancers: yes, many treatments with cryo and efudex, PDT 1/2020  History of blistering/severe sunburns:Yes, Details: during youth  Family history of skin cancer:Yes, Details: father type unknown   Family history of atypical moles:No\"    ROS: no fevers/chills. No itch.  No cough.  DermPMH: mult past NMSC  No problem-specific Assessment & Plan notes found for this encounter.    Relevant PMH: mult med comorbidities  Social: never smoker    PE: Gen:WDWN male in NAD. Skin: Scalp/face/eyes/lips/neck/chest/back/arms/legs/hands/feet/buttocks - without suspicious lesions noted.  Genitals exam declined.  Limited exam under mask.  -thin hyperkeratotic papules on nose, and 2 on right temple  -scattered thin gritty papules on forehead, right cheek and temples  -scaling on facial NL folds  -scar on back, well healed  -crusted papules many small, diffuse, lower abd  -scattered hyperpigmented macules/papules appearing on torso/extremities, appearing benign without suspicious features; many waxy texture  -diffuse xerosis, most notable, lower extremities    A/P: AKs: face - nose/right temple - moderate thickness  -counseled on diagnosis and treatment, including risks/benefits/alternatives of cyrotherapy  -LN2 25 sec X 2 cycles X 3lesions  -f/u if persists at 1 month    Exacerbated condition: AKs, face: thin  -counseled re: " dx/tx  -Efudex cream BID X 2-4 weeks, winter 2021    Xerosis, diffuse:  -counseled re: dx/tx  -OTC moisturizers recommended    New condition: seb derm face:  -counseled re: dx/tx  -lotrimin + HCT creams mixed 1:1 qday-BID PRN    Lentigos/SKs: benign  -reassurance  -reviewed skin cancer detection/prevention    Landon's dz, abdomen/chest:  -cont TAC 0.1% cream BID PRN reviewed with patient    Hx of skin cancer:  -cont'd sunprotection and skin cancer surveillance  -Q 6mo-annual exam recommended; f/u suspicious lesions PRN    I have reviewed medications relevant to my specialty.

## 2021-03-03 DIAGNOSIS — Z23 NEED FOR VACCINATION: ICD-10-CM

## 2021-04-07 DIAGNOSIS — G20.C PRIMARY PARKINSONISM (HCC): ICD-10-CM

## 2021-05-17 DIAGNOSIS — K21.9 GASTROESOPHAGEAL REFLUX DISEASE WITHOUT ESOPHAGITIS: ICD-10-CM

## 2021-05-17 RX ORDER — OMEPRAZOLE 20 MG/1
CAPSULE, DELAYED RELEASE ORAL
Qty: 90 CAPSULE | Refills: 2 | Status: ON HOLD | OUTPATIENT
Start: 2021-05-17 | End: 2022-03-08

## 2021-05-27 DIAGNOSIS — G20.C PRIMARY PARKINSONISM (HCC): ICD-10-CM

## 2021-05-27 NOTE — TELEPHONE ENCOUNTER
Received request via: Pharmacy    Was the patient seen in the last year in this department? Yes    Does the patient have an active prescription (recently filled or refills available) for medication(s) requested? No     Patient last seen on 10/02/2020 , medication last filled on 04/07/2021.

## 2021-06-18 ENCOUNTER — OFFICE VISIT (OUTPATIENT)
Dept: DERMATOLOGY | Facility: IMAGING CENTER | Age: 71
End: 2021-06-18
Payer: MEDICARE

## 2021-06-18 DIAGNOSIS — D49.2 NEOPLASM OF SKIN: ICD-10-CM

## 2021-06-18 PROCEDURE — 11102 TANGNTL BX SKIN SINGLE LES: CPT | Performed by: DERMATOLOGY

## 2021-06-18 NOTE — PROGRESS NOTES
"CC: spot check     Subjective: Previously seen patient here for spot check     HPI/location: Rt forearm skin lesion   Time present: 1 mth   Painful lesion: No  Itching lesion: No  Enlarging lesion: No  Anything make it better or worse?    From prior notes:  \"History of skin cancer: Yes, Details: BCC 30 years ago hairline , over the last several years has had 5 BCC forehead, nose , face , SCC on back ( 7 years ago)   History of Aks/precancers: yes, many treatments with cryo and efudex, PDT 1/2020  History of blistering/severe sunburns:Yes, Details: during youth  Family history of skin cancer:Yes, Details: father type unknown   Family history of atypical moles:No\"    ROS: no fevers/chills. No itch.  No cough.  DermPMH: mult past NMSC  No problem-specific Assessment & Plan notes found for this encounter.    Relevant PMH: mult med comorbidities  Social: never smoker    PE: Gen:WDWN male in NAD. Skin:focal exam:   Crusted papule on right arm, appearing inflamed.    A/P: Neoplasm NOS: r arm; r/o nmsc vs wart  -consent for bx, including R/B/A. Cleaned with EtOH, anesthesia with lidocaine 1% + epinephrine, shave bx, AlCl3 for hemostasis  -vaseline/bandage and wound care reviewed        I have reviewed medications relevant to my specialty.            "

## 2021-06-24 ENCOUNTER — TELEPHONE (OUTPATIENT)
Dept: DERMATOLOGY | Facility: IMAGING CENTER | Age: 71
End: 2021-06-24

## 2021-07-09 ENCOUNTER — PATIENT MESSAGE (OUTPATIENT)
Dept: CARDIOLOGY | Facility: MEDICAL CENTER | Age: 71
End: 2021-07-09

## 2021-07-09 DIAGNOSIS — I48.0 PAROXYSMAL ATRIAL FIBRILLATION (HCC): ICD-10-CM

## 2021-07-15 DIAGNOSIS — I48.0 PAROXYSMAL ATRIAL FIBRILLATION (HCC): ICD-10-CM

## 2021-07-15 NOTE — PROGRESS NOTES
Pt overdue for anticoag f/u.  Labs placed; MA to call to schedule f/u.      SILVA Fung  [facility]

## 2021-07-19 ENCOUNTER — DOCUMENTATION (OUTPATIENT)
Dept: VASCULAR LAB | Facility: MEDICAL CENTER | Age: 71
End: 2021-07-19

## 2021-07-19 NOTE — PROGRESS NOTES
Left message for patient to call back and schedule Erma f/u, let him know he is dues for non-fasting blood work as well.

## 2021-07-30 ENCOUNTER — OFFICE VISIT (OUTPATIENT)
Dept: DERMATOLOGY | Facility: IMAGING CENTER | Age: 71
End: 2021-07-30
Payer: MEDICARE

## 2021-07-30 DIAGNOSIS — Z85.89 HISTORY OF SQUAMOUS CELL CARCINOMA: ICD-10-CM

## 2021-07-30 DIAGNOSIS — Z12.83 SKIN CANCER SCREENING: ICD-10-CM

## 2021-07-30 DIAGNOSIS — L90.8 SKIN AGING: ICD-10-CM

## 2021-07-30 DIAGNOSIS — L21.9 SEBORRHEA: ICD-10-CM

## 2021-07-30 DIAGNOSIS — Z85.828 HISTORY OF BASAL CELL CARCINOMA: ICD-10-CM

## 2021-07-30 DIAGNOSIS — L82.1 SEBORRHEIC KERATOSIS: ICD-10-CM

## 2021-07-30 DIAGNOSIS — L81.4 LENTIGO: ICD-10-CM

## 2021-07-30 DIAGNOSIS — L57.0 ACTINIC KERATOSIS: ICD-10-CM

## 2021-07-30 DIAGNOSIS — L85.3 XEROSIS OF SKIN: ICD-10-CM

## 2021-07-30 DIAGNOSIS — D22.9 NEVUS: ICD-10-CM

## 2021-07-30 PROCEDURE — 17003 DESTRUCT PREMALG LES 2-14: CPT | Performed by: DERMATOLOGY

## 2021-07-30 PROCEDURE — 99213 OFFICE O/P EST LOW 20 MIN: CPT | Mod: 25 | Performed by: DERMATOLOGY

## 2021-07-30 PROCEDURE — 17000 DESTRUCT PREMALG LESION: CPT | Performed by: DERMATOLOGY

## 2021-07-30 NOTE — PROGRESS NOTES
"CC: Follow up CLEVE     Subjective: Previously seen patient here for CLEVE.  Reports one site of concern on right ear. Crusting in canal, picks free and doesn't heal. Not especially painful.    HPI:  Skin lesion   Location: right ear   Time present: few month   Painful lesion: No  Itching lesion: No  Enlarging lesion: No  Anything make it better or worse?no       From prior notes:  \"History of skin cancer: Yes, Details: BCC 30 years ago hairline , over the last several years has had 5 BCC forehead, nose , face , SCC on back ( 7 years ago)   History of Aks/precancers: yes, many treatments with cryo and efudex, PDT 1/2020  History of blistering/severe sunburns:Yes, Details: during youth  Family history of skin cancer:Yes, Details: father type unknown   Family history of atypical moles:No\"    ROS: no fevers/chills. No itch.  No cough.  DermPMH: mult past NMSC  No problem-specific Assessment & Plan notes found for this encounter.    Relevant PMH: mult med comorbidities  Social: never smoker    PE: Gen:WDWN male in NAD. Skin: Scalp/face/eyes/lips/neck/chest/back/arms/legs/hands/feet/buttocks - without suspicious lesions noted.  Genitals exam declined  -scaling, lite of scalp  -few thin HK papules on right dorsal hand and one in right external ear canal.   -scattered hyperpigmented macules/papules, appearing benign on torso and extremities  -xerosis, diffusely on lower legs, feet      A/P: AKs: right hand and right ear canal  -counseled on diagnosis and treatment, including risks/benefits/alternatives of cyrotherapy  -LN2 25 sec X 2 cycles X 6lesions  -f/u future visits   -f/u if persists at 1 month    Hx of skin cancer: most recently SCCIS - free margins, right arm, observing. F/u future visit.  Has declined ED&C, excision  -cont'd sunprotection and skin cancer surveillance  -Q 6mo-annual exam recommended; f/u suspicious lesions PRN    Nevi: benign appearing:  -Reviewed skin cancer detection/prevention  -RTC PRN " growth/changes/concerning features    Lentigos/SKs: benign  -reassurance  -reviewed skin cancer detection/prevention    Seborrhea: scalp:  -reviewed dx/tx  -declined rx today  -declined shampoos    Xerosis, diffuse:  -counseled re: dx/tx  -OTC moisturizers recommended      I have reviewed medications relevant to my specialty.

## 2021-08-02 DIAGNOSIS — G20.C PRIMARY PARKINSONISM (HCC): ICD-10-CM

## 2021-08-02 RX ORDER — RASAGILINE 1 MG/1
1 TABLET ORAL
Qty: 90 TABLET | Refills: 0 | Status: SHIPPED | OUTPATIENT
Start: 2021-08-02 | End: 2021-09-22 | Stop reason: SDUPTHER

## 2021-08-02 NOTE — TELEPHONE ENCOUNTER
Patient needs a follow up appointment in 10/2021 per Dr. Waterman last note. Please have him schedule.

## 2021-08-04 ENCOUNTER — PATIENT MESSAGE (OUTPATIENT)
Dept: CARDIOLOGY | Facility: MEDICAL CENTER | Age: 71
End: 2021-08-04

## 2021-08-04 DIAGNOSIS — I48.0 PAROXYSMAL ATRIAL FIBRILLATION (HCC): ICD-10-CM

## 2021-08-05 ENCOUNTER — SUPERVISING PHYSICIAN REVIEW (OUTPATIENT)
Dept: VASCULAR LAB | Facility: MEDICAL CENTER | Age: 71
End: 2021-08-05

## 2021-08-05 ENCOUNTER — ANTICOAGULATION VISIT (OUTPATIENT)
Dept: MEDICAL GROUP | Facility: MEDICAL CENTER | Age: 71
End: 2021-08-05
Payer: MEDICARE

## 2021-08-05 DIAGNOSIS — I48.0 PAROXYSMAL ATRIAL FIBRILLATION (HCC): ICD-10-CM

## 2021-08-05 DIAGNOSIS — Z79.01 LONG TERM (CURRENT) USE OF ANTICOAGULANTS: Primary | ICD-10-CM

## 2021-08-05 PROCEDURE — 99211 OFF/OP EST MAY X REQ PHY/QHP: CPT | Performed by: INTERNAL MEDICINE

## 2021-08-05 RX ORDER — AMIODARONE HYDROCHLORIDE 200 MG/1
200 TABLET ORAL DAILY
Qty: 90 TABLET | Refills: 3 | Status: SHIPPED | OUTPATIENT
Start: 2021-08-05 | End: 2021-09-01

## 2021-08-05 NOTE — PROGRESS NOTES
Initial anticoag note and most recent PCP note reviewed.  Patient with afib and chads vasc = 2    Pending further recommendations, we will continue with indefinite anticoagulation with Eliquis as directed by  PCP    Will defer all management of rhythm, rate, and other cv issues, aside from anticoagulation, to PCP and/or cards    Michael Bloch, MD  Anticoagulation Clinic    Cc: Radha Culp

## 2021-08-05 NOTE — PATIENT COMMUNICATION
Hira Starr M.D.  You 1 hour ago (3:12 PM)     Looks like Afib is back. As is typical this condition gradually becomes more frequent/stubborn over time. I suggest starting amiodarone 200 mg daily, arranging a cardioversion in 1-2 weeks and a referral to EP.     Thanks   BE      Called pt and discussed recommendations to start amiodarone, have a cardioversion, and see EP. Pt explains that he has only had about 4 episodes of Afib in the last 15 years and would like to know if he can return to his previous management strategy of taking Rythmol PRN. Discussed this with BE, but since pt had a borderline abnormal stress test in 2020, he would like to avoid Rythmol. Informed pt of this, and he is ok with BE's recommendations. Amiodarone Rx sent, scheduled pt with  on 8/25, and informed pt that someone would call him to schedule the cardioversion. Also advised that if he feels he has returned to a normal rhythm prior to the cardioversion to send us a Kardia strip.

## 2021-08-05 NOTE — Clinical Note
Dr. Culp,     Pt seen for initial anticoagulation appointment for Eliquis. F/U6 months.     Ursula Thayer, EduardD

## 2021-08-05 NOTE — PROGRESS NOTES
NEW DOAC   .  Anticoagulation Patient Findings      PCP: Radha Culp M.D.  Cardiologist: Hira Starr MD  Dx: atrial fibrillation  CHADSVASC = 2  HAS-BLED = 1  Target End Date = indefinite     Pt Hx: atrial fibrillation    Labs:  Lab Results   Component Value Date/Time    WBC 4.3 (L) 02/01/2021 09:48 AM    RBC 5.21 02/01/2021 09:48 AM    HEMOGLOBIN 15.6 02/01/2021 09:48 AM    HEMATOCRIT 46.8 02/01/2021 09:48 AM    MCV 89.8 02/01/2021 09:48 AM    MCH 29.9 02/01/2021 09:48 AM    MCHC 33.3 (L) 02/01/2021 09:48 AM    MPV 8.8 (L) 02/01/2021 09:48 AM    NEUTSPOLYS 58.90 01/16/2020 08:52 AM    LYMPHOCYTES 32.70 01/16/2020 08:52 AM    MONOCYTES 6.60 01/16/2020 08:52 AM    EOSINOPHILS 0.90 01/16/2020 08:52 AM    BASOPHILS 0.70 01/16/2020 08:52 AM      Lab Results   Component Value Date/Time    SODIUM 137 02/01/2021 09:48 AM    POTASSIUM 4.3 02/01/2021 09:48 AM    CHLORIDE 100 02/01/2021 09:48 AM    CO2 28 02/01/2021 09:48 AM    GLUCOSE 97 02/01/2021 09:48 AM    BUN 19 02/01/2021 09:48 AM    CREATININE 0.87 02/01/2021 09:48 AM          Pt has been on Eliquis  and new to [facility]. Discussed:   · Indication for DOAC therapy.  · Importance of monitoring and compliance.   · Monitoring parameters, signs and symptoms of bleeding or clotting.  · DOAC therapy, side effects, potential   · Lifestyle safety, ie smoking, ETOH, hobby safety, fall safety/prevention  · Procedures for missed doses or suspected missed doses, surgeries/procedures, travel, dental work, any medication changes      DOAC affordable = yes        Labs to be completed prior to next f/u - CBC, CMP    F/U - 6 months    Ursula Thayer, EduardD

## 2021-08-06 ENCOUNTER — TELEPHONE (OUTPATIENT)
Dept: CARDIOLOGY | Facility: MEDICAL CENTER | Age: 71
End: 2021-08-06

## 2021-08-06 ENCOUNTER — SUPERVISING PHYSICIAN REVIEW (OUTPATIENT)
Dept: VASCULAR LAB | Facility: MEDICAL CENTER | Age: 71
End: 2021-08-06

## 2021-08-06 NOTE — TELEPHONE ENCOUNTER
Regarding: RE: Non-Urgent Medical Question  Contact: [phone]  ----- Message from Liz Luis R.N. sent at 8/5/2021  5:19 PM PDT -----  Alex Khan,  Can you call pt to schedule a cardioversion in the next 1-2 weeks?  Thank you!     ----- Message from Gopal Mallory Jr. to Liz Luis R.N. sent at 8/5/2021  2:32 PM -----   here are snap shots of Allie's reporting. Will be best I can get you. Thanks      ----- Message -----       From:Nurse Liz BARRAGAN       Sent:8/5/2021  1:58 PM PDT         To:Gopal Mallory Jr.    Subject:RE: Non-Urgent Medical Question    Alex Herron,    There's not really a way for you to send it via email. I'll attach the instructions for how to attach to a Lightbox message. I believe you need to save the file as a jpg. And if there's a way to save it as encrypted versus not encrypted, save it as not encrypted. If it's already saved as a pdf and you can't change it, you could also try taking a screen shot of it and send that over.     Thank you,    Liz GABRIEL      ----- Message -----       From:Gopal Mallory Jr.       Sent:8/5/2021 11:49 AM PDT         To:Nurse Liz BARRAGAN    Subject:RE: Non-Urgent Medical Question    light headed and feeling pulse flutter a bit for 1+ days. Have not had afib for 1 year +. Need to email Kardia pdf. Phone is locked out of my chart      ----- Message -----       From:Nurse Liz BARRAGAN       Sent:8/5/2021 11:28 AM PDT         To:Gopal Mallory Jr.    Subject:RE: Non-Urgent Medical Question    Alex Herron,    Could you attach a copy of the Kardia strip? What have your symptoms been, and how frequently have you been noticing them?     Thank you,    Liz GABRIEL      ----- Message -----       From:Gopal Mallory Jr.       Sent:8/4/2021  4:21 PM PDT         To:Physician Hira Starr    Subject:Non-Urgent Medical Question    I have been feeling a little off and I checked my Karda reading  on the iPhone and it indicates possible afib. It also shows a pulsE rate of 85 which is higher than the normal 60 or so. Should get checked out. Thanks

## 2021-08-06 NOTE — TELEPHONE ENCOUNTER
Per patients request, patient is scheduled on 9-9-21 for a CV w/anesthesia with Dr. Starr. No meds to stop and patient to check in at 8:00 for a 10:00 procedure. Updated H&P to be done on admit by NP. Pre admit to call patient.

## 2021-08-11 ENCOUNTER — PATIENT MESSAGE (OUTPATIENT)
Dept: CARDIOLOGY | Facility: MEDICAL CENTER | Age: 71
End: 2021-08-11

## 2021-08-11 DIAGNOSIS — I48.0 PAROXYSMAL ATRIAL FIBRILLATION (HCC): ICD-10-CM

## 2021-08-19 ENCOUNTER — TELEPHONE (OUTPATIENT)
Dept: CARDIOLOGY | Facility: MEDICAL CENTER | Age: 71
End: 2021-08-19

## 2021-08-19 NOTE — PATIENT COMMUNICATION
Hira Starr M.D.  You 1 minute ago (11:05 AM)     Kardia tracings from the 11th show sinus. I suggest continuing amiodarone at present dosage unless the bradycardia is obviously producing symptoms. Lets arrange a 2-7 day monitor ASAP- to be completed prior to the EP visit. No need for DCCV at the present time.     Thanks   BE

## 2021-08-19 NOTE — TELEPHONE ENCOUNTER
----- Message from Liz Luis R.N. sent at 8/19/2021 11:33 AM PDT -----  Regarding: FW: Non-Urgent Medical Question  Contact: [phone]  Bill,  Can you cancel pt's cardioversion?  Thank you!  ----- Message -----  From: Gopal Mallory Jr.  Sent: 8/19/2021  10:34 AM PDT  To: Liz Luis R.N.  Subject: Non-Urgent Medical Question                      Hi, the Kardia continues to show no afib but it did show today that I have Bricardia or a heart rate of less than 50. It indicated 46 bpm.  I confirmed that on my bp cuff at 46. Still taking the amiodarone, is that slowing me down too much? My normal resting rate is around 57. FYI I had to reschedule my appointment with Dr Gray to September 1. Thanks

## 2021-08-20 ENCOUNTER — TELEPHONE (OUTPATIENT)
Dept: CARDIOLOGY | Facility: MEDICAL CENTER | Age: 71
End: 2021-08-20

## 2021-08-20 ENCOUNTER — NON-PROVIDER VISIT (OUTPATIENT)
Dept: CARDIOLOGY | Facility: MEDICAL CENTER | Age: 71
End: 2021-08-20
Payer: MEDICARE

## 2021-08-20 DIAGNOSIS — I49.3 PVC (PREMATURE VENTRICULAR CONTRACTION): ICD-10-CM

## 2021-08-20 DIAGNOSIS — I49.1 PREMATURE ATRIAL CONTRACTION: ICD-10-CM

## 2021-08-20 NOTE — TELEPHONE ENCOUNTER
Patient enrolled in the 7 day [employer] Heart monitoring program per Hira Starr MD.  Monitor to be shipped to patient by [employer].  >Pending Baseline.  >Pending EOS.

## 2021-08-30 DIAGNOSIS — G20.C PRIMARY PARKINSONISM (HCC): ICD-10-CM

## 2021-09-01 ENCOUNTER — OFFICE VISIT (OUTPATIENT)
Dept: CARDIOLOGY | Facility: MEDICAL CENTER | Age: 71
End: 2021-09-01
Attending: INTERNAL MEDICINE
Payer: MEDICARE

## 2021-09-01 VITALS
HEIGHT: 77 IN | BODY MASS INDEX: 28.46 KG/M2 | DIASTOLIC BLOOD PRESSURE: 76 MMHG | OXYGEN SATURATION: 99 % | HEART RATE: 53 BPM | WEIGHT: 241 LBS | SYSTOLIC BLOOD PRESSURE: 104 MMHG

## 2021-09-01 DIAGNOSIS — I48.0 PAROXYSMAL ATRIAL FIBRILLATION (HCC): ICD-10-CM

## 2021-09-01 LAB — EKG IMPRESSION: NORMAL

## 2021-09-01 PROCEDURE — 93000 ELECTROCARDIOGRAM COMPLETE: CPT | Performed by: INTERNAL MEDICINE

## 2021-09-01 PROCEDURE — 99204 OFFICE O/P NEW MOD 45 MIN: CPT | Performed by: INTERNAL MEDICINE

## 2021-09-01 ASSESSMENT — FIBROSIS 4 INDEX: FIB4 SCORE: 1.45

## 2021-09-01 NOTE — PROGRESS NOTES
Arrhythmia Clinic Note (New patient)     DOS: 9/1/2021    Referring physician: Dr Starr    Chief complaint/Reason for consult: Afib    HPI: 69 y/o M with pAF dating back 20 yrs. Has had minimal episodes but recently had AF with RVR recur and was started on amiodarone which chemically cardioverted him. NSR since. Notes low HR on amiodarone. Wearing Biotel now.    ROS (+ highlighted in bold):  Constitutional: Fevers/chills/fatigue/weightloss  HEENT: Blurry vision/eye pain/sore throat/hearing loss  Respiratory: Shortness of breath/cough  Cardiovascular: Chest pain/palpitations/edema/orthopnea/syncope  GI: Nausea/vomitting/diarrhea  MSK: Arthralgias/myagias/muscle weakness  Skin: Rash/sores  Neurological: Numbness/tremors/vertigo  Endocrine: Excessive thirst/polyuria/cold intolerance/heat intolerance  Psych: Depression/anxiety    Past Medical History:   Diagnosis Date   • Arrhythmia     Afib   • Cancer (HCC)     Basal cell/ Squamous skin   • Hypertension        Past Surgical History:   Procedure Laterality Date   • OTHER      mohs to nose, back       Social History     Socioeconomic History   • Marital status:      Spouse name: Not on file   • Number of children: Not on file   • Years of education: Not on file   • Highest education level: Not on file   Occupational History   • Not on file   Tobacco Use   • Smoking status: Never Smoker   • Smokeless tobacco: Never Used   Vaping Use   • Vaping Use: Never used   Substance and Sexual Activity   • Alcohol use: Yes     Comment: 2 glasses wine/night   • Drug use: No   • Sexual activity: Yes     Partners: Female   Other Topics Concern   • Not on file   Social History Narrative   • Not on file     Social Determinants of Health     Financial Resource Strain:    • Difficulty of Paying Living Expenses:    Food Insecurity:    • Worried About Running Out of Food in the Last Year:    • Ran Out of Food in the Last Year:    Transportation Needs:    • Lack of Transportation  (Medical):    • Lack of Transportation (Non-Medical):    Physical Activity:    • Days of Exercise per Week:    • Minutes of Exercise per Session:    Stress:    • Feeling of Stress :    Social Connections:    • Frequency of Communication with Friends and Family:    • Frequency of Social Gatherings with Friends and Family:    • Attends [employer] Services:    • Active Member of Clubs or Organizations:    • Attends Club or Organization Meetings:    • Marital Status:    Intimate Partner Violence:    • Fear of Current or Ex-Partner:    • Emotionally Abused:    • Physically Abused:    • Sexually Abused:        Family History   Problem Relation Age of Onset   • Cancer Mother         bladder non-smoker   • Dementia Father    • Cancer Brother         prostate age 52       Allergies   Allergen Reactions   • Molds & Smuts Unspecified     Sneezing       Current Outpatient Medications   Medication Sig Dispense Refill   • carbidopa-levodopa (SINEMET)  MG Tab TAKE 1 TABLET BY MOUTH TWICE A  Tablet 0   • rasagiline (AZILECT) 1 MG Tab Take 1 tablet by mouth every day. 90 tablet 0   • ELIQUIS 5 MG Tab TAKE 1 TABLET BY MOUTH TWICE A DAY 60 tablet 1   • omeprazole (PRILOSEC) 20 MG delayed-release capsule TAKE 1 CAPSULE BY MOUTH EVERY DAY 90 capsule 2   • fluorouracil (EFUDEX) 5 % cream AAA forehead, upper cheeks, temples BID X 2-4 weeks, then stop 40 g 1   • triamcinolone acetonide (KENALOG) 0.1 % Cream AAA abdomen BID PRN rash.  Avoid use on sensitive sites.  If use on face, nose; limit to sparing amount 2X/week 60 g 1   • Multiple Vitamins-Minerals (MULTIVITAMIN PO) Take 1 Tab by mouth every morning.     • acetaminophen (TYLENOL) 325 MG Tab Take 325 mg by mouth every four hours as needed for Moderate Pain.     • fluticasone (FLONASE) 50 MCG/ACT nasal spray Spray 1 Spray in nose 1 time daily as needed (ALLERGY).       No current facility-administered medications for this visit.       Physical Exam:  Vitals:    09/01/21  "0949   BP: 104/76   BP Location: Left arm   Patient Position: Sitting   BP Cuff Size: Adult   Pulse: (!) 53   SpO2: 99%   Weight: 109 kg (241 lb)   Height: 1.956 m (6' 5\")     General appearance: NAD, conversant   Eyes: anicteric sclerae, moist conjunctivae; no lid-lag; PERRLA  HENT: Atraumatic; oropharynx clear with moist mucous membranes and no mucosal ulcerations; normal hard and soft palate  Neck: Trachea midline; FROM, supple, no thyromegaly or lymphadenopathy  Lungs: CTA, with normal respiratory effort and no intercostal retractions  CV: RRR, no MRGs, no JVD   Abdomen: Soft, non-tender; no masses or HSM  Extremities: No peripheral edema or extremity lymphadenopathy  Skin: Normal temperature, turgor and texture; no rash, ulcers or subcutaneous nodules  Psych: Appropriate affect, alert and oriented to person, place and time    Data:  Lipids:   Lab Results   Component Value Date/Time    CHOLSTRLTOT 148 02/01/2021 09:48 AM    TRIGLYCERIDE 76 02/01/2021 09:48 AM    HDL 49 02/01/2021 09:48 AM    LDL 84 02/01/2021 09:48 AM        BMP:  Lab Results   Component Value Date/Time    SODIUM 137 02/01/2021 0948    POTASSIUM 4.3 02/01/2021 0948    CHLORIDE 100 02/01/2021 0948    CO2 28 02/01/2021 0948    GLUCOSE 97 02/01/2021 0948    BUN 19 02/01/2021 0948    CREATININE 0.87 02/01/2021 0948    CALCIUM 9.0 02/01/2021 0948    ANION 9.0 02/01/2021 0948        TSH:   Lab Results   Component Value Date/Time    TSHULTRASEN 3.900 10/02/2019 0800        THYROXINE (T4):   No results found for: JUAN MANUEL     CBC:   Lab Results   Component Value Date/Time    WBC 4.3 (L) 02/01/2021 09:48 AM    RBC 5.21 02/01/2021 09:48 AM    HEMOGLOBIN 15.6 02/01/2021 09:48 AM    HEMATOCRIT 46.8 02/01/2021 09:48 AM    MCV 89.8 02/01/2021 09:48 AM    MCH 29.9 02/01/2021 09:48 AM    MCHC 33.3 (L) 02/01/2021 09:48 AM    RDW 40.0 02/01/2021 09:48 AM    PLATELETCT 164 02/01/2021 09:48 AM    MPV 8.8 (L) 02/01/2021 09:48 AM    NEUTSPOLYS 58.90 01/16/2020 08:52 " AM    LYMPHOCYTES 32.70 01/16/2020 08:52 AM    MONOCYTES 6.60 01/16/2020 08:52 AM    EOSINOPHILS 0.90 01/16/2020 08:52 AM    BASOPHILS 0.70 01/16/2020 08:52 AM    IMMGRAN 0.20 01/16/2020 08:52 AM    NRBC 0.00 01/16/2020 08:52 AM    NEUTS 2.67 01/16/2020 08:52 AM    LYMPHS 1.48 01/16/2020 08:52 AM    MONOS 0.30 01/16/2020 08:52 AM    EOS 0.04 01/16/2020 08:52 AM    BASO 0.03 01/16/2020 08:52 AM    IMMGRANAB 0.01 01/16/2020 08:52 AM    NRBCAB 0.00 01/16/2020 08:52 AM        CBC w/o DIFF  Lab Results   Component Value Date/Time    WBC 4.3 (L) 02/01/2021 09:48 AM    RBC 5.21 02/01/2021 09:48 AM    HEMOGLOBIN 15.6 02/01/2021 09:48 AM    MCV 89.8 02/01/2021 09:48 AM    MCH 29.9 02/01/2021 09:48 AM    MCHC 33.3 (L) 02/01/2021 09:48 AM    RDW 40.0 02/01/2021 09:48 AM    MPV 8.8 (L) 02/01/2021 09:48 AM       Prior echo/stress results reviewed: Normal EF    EKG interpreted by me: NSR    Impression/Plan:  1. Paroxysmal atrial fibrillation (HCC)  EKG     1. pAF  2. High risk medication encounter, amiodarone  3. Oral anticoagulation monitoring, Eliquis    - Discussed AADs and PVI for mgmt of Afib  - Pt wants to stop amio I think this is reasonable  - Discussed PVI to prevent AF recurrence, he wants to wait for now  - If recurrent Afib off amio he will reconsider PVI  - Labs checked continue Eliquis    FV 6 months sooner if needed    Yobany Gray MD  Cardiac Electrophysiology

## 2021-09-22 ENCOUNTER — OFFICE VISIT (OUTPATIENT)
Dept: NEUROLOGY | Facility: MEDICAL CENTER | Age: 71
End: 2021-09-22
Attending: PSYCHIATRY & NEUROLOGY
Payer: MEDICARE

## 2021-09-22 VITALS
DIASTOLIC BLOOD PRESSURE: 82 MMHG | SYSTOLIC BLOOD PRESSURE: 138 MMHG | HEIGHT: 77 IN | OXYGEN SATURATION: 96 % | HEART RATE: 50 BPM | WEIGHT: 231.48 LBS | BODY MASS INDEX: 27.33 KG/M2 | TEMPERATURE: 97.6 F

## 2021-09-22 DIAGNOSIS — G20.C PRIMARY PARKINSONISM (HCC): Primary | ICD-10-CM

## 2021-09-22 PROCEDURE — 99214 OFFICE O/P EST MOD 30 MIN: CPT | Performed by: PSYCHIATRY & NEUROLOGY

## 2021-09-22 PROCEDURE — 99211 OFF/OP EST MAY X REQ PHY/QHP: CPT | Performed by: PSYCHIATRY & NEUROLOGY

## 2021-09-22 RX ORDER — RASAGILINE 1 MG/1
1 TABLET ORAL
Qty: 90 TABLET | Refills: 3 | Status: SHIPPED | OUTPATIENT
Start: 2021-09-22 | End: 2022-03-25 | Stop reason: SDUPTHER

## 2021-09-22 ASSESSMENT — ENCOUNTER SYMPTOMS
INSOMNIA: 0
TREMORS: 1
HALLUCINATIONS: 0
MEMORY LOSS: 0
FALLS: 0
CONSTIPATION: 1

## 2021-09-22 ASSESSMENT — PATIENT HEALTH QUESTIONNAIRE - PHQ9: CLINICAL INTERPRETATION OF PHQ2 SCORE: 0

## 2021-09-22 ASSESSMENT — FIBROSIS 4 INDEX: FIB4 SCORE: 1.47

## 2021-09-22 NOTE — PROGRESS NOTES
Subjective     Gopal Mallory Jr. is a 71 y.o. male who presents for follow-up, with a history of mild left-sided david-Parkinson's disease, now on Sinemet.     HPI    When last seen, we had kept Gopal on his Azilect 1 mg dose every morning, but over the next several months he began to notice left hand becoming a bit stiffer, the entire left side a bit slower.  Even the tremor on the left was becoming more noticeable.  We started him on Sinemet 25/100, he now takes this with the Azilect in the morning, and the tablet at around 1 PM.  Symptoms are definitely improved, the afternoon dosing allows him to get through the rest of the evening and nighttime without major issue.    With the addition of the medication, he denies dyskinesias, there is no wearing-off between doses, there is no issue with daytime drowsiness or sleep attacks, hallucinations, dystonic posturing, on/off episodes or freezing.    Speech is still softened, drooling occurs mostly at night.  He swallows easily.  He stands and walks easily without an issue of falling.  The left leg does not drag.  Energy level overall is quite good.  He denies daily fluctuations.    He has had no problems with orthostatic dizziness or actual syncope, early satiety when he eats with nausea and vomiting, worsening of his baseline constipation, or urinary retention.    Medical, surgical and family histories are reviewed, there are no new drug allergies.  He is on his Sinemet and Azilect as above, also still on Eliquis, Prilosec, vitamin B12, vitamin C supplement and Flonase as needed.    Review of Systems   Constitutional: Negative for malaise/fatigue.   Gastrointestinal: Positive for constipation.   Musculoskeletal: Negative for falls.   Neurological: Positive for tremors.   Psychiatric/Behavioral: Negative for hallucinations and memory loss. The patient does not have insomnia.    All other systems reviewed and are negative.  Event  Objective     /82    "Pulse (!) 50   Temp 36.4 °C (97.6 °F)   Ht 1.956 m (6' 5\")   Wt 105 kg (231 lb 7.7 oz)   SpO2 96%   BMI 27.45 kg/m²      Physical Exam    He appears in no acute distress.  His vital signs are stable.  Pulse is a little low at 50, rhythm is regular.  There is no malar rash or sialorrhea.  His neck is supple, range of motion is full.  Cardiac evaluation reveals a regular rhythm.  There is no lower extremity edema.     Neurological Exam    He is fully oriented, there is no aphasia, apraxia, or inattention.    PERRLA/EOMI, visual fields are full, there is still some mild bradykinesia with a reduction in eye blink frequency.  There is only mild hypophonia, no tachyphemia or dysarthria.  Facial movements are symmetric.  Shoulder shrug and head rotation are intact.    The left-sided rigidity and bradykinesia are still seen, slightly less prominent.  There is no tremor on today's examination.  There is no asterixis or drift.  Strength is intact throughout.  There are no pathologic reflexes.    He stands easily, only a little slow on initiation, stride length is maintained bilaterally.  The left arm swing is only slightly diminished, tremor with the left hand does not reemerge.  He requires only 3 steps to turn in either direction.  There is no appendicular dystaxia.  Repetitive movements with the left hand are diminished in amplitude when compared to the right.  Movements are symmetric with the feet.    Sensory exam is deferred.    Assessment & Plan     1. Primary parkinsonism (HCC)  He is doing well, we will maintain his present regimen.  There is no wearing-off on his present plan.  He has none of the dopamine-induced side effects or adverse events occurring at this time.  He and I can simply follow-up in 6 months.    - carbidopa-levodopa (SINEMET)  MG Tab; TAKE 1 TABLET BY MOUTH TWICE A DAY  Dispense: 180 Tablet; Refill: 3  - rasagiline (AZILECT) 1 MG Tab; Take 1 Tablet by mouth every day.  Dispense: 90 " Tablet; Refill: 3       Time: A total of 30 minutes was spent with the patient including precharting, of the record review including review of imaging studies, as well as documentation and discussions with other healthcare staff.  Of this more than 20 minutes was spent face-to-face for exam, review as well as discussion, counseling and coordination of care.

## 2021-10-08 ENCOUNTER — OFFICE VISIT (OUTPATIENT)
Dept: URGENT CARE | Facility: CLINIC | Age: 71
End: 2021-10-08
Payer: MEDICARE

## 2021-10-08 VITALS
TEMPERATURE: 97.4 F | WEIGHT: 235 LBS | HEIGHT: 77 IN | SYSTOLIC BLOOD PRESSURE: 130 MMHG | HEART RATE: 59 BPM | OXYGEN SATURATION: 98 % | BODY MASS INDEX: 27.75 KG/M2 | RESPIRATION RATE: 15 BRPM | DIASTOLIC BLOOD PRESSURE: 80 MMHG

## 2021-10-08 DIAGNOSIS — M70.22 OLECRANON BURSITIS OF LEFT ELBOW: ICD-10-CM

## 2021-10-08 PROCEDURE — 20610 DRAIN/INJ JOINT/BURSA W/O US: CPT | Performed by: NURSE PRACTITIONER

## 2021-10-08 ASSESSMENT — ENCOUNTER SYMPTOMS
NECK PAIN: 0
FEVER: 0
NAUSEA: 0
BACK PAIN: 0
FALLS: 0
CHILLS: 0
VOMITING: 0
MYALGIAS: 1

## 2021-10-08 ASSESSMENT — FIBROSIS 4 INDEX: FIB4 SCORE: 1.47

## 2021-10-08 NOTE — PROGRESS NOTES
Subjective:     Gopal Mallory Jr. is a 71 y.o. male who presents for Elbow Swelling (redness, started about a week ago. left )      HPI  Pt presents for evaluation of a new problem.  Gopal is a pleasant 71-year-old male presents to urgent care today with complaints of swelling and pain in his left elbow.  He notes that approximately 10 years ago he was treated for the same issue.  He notes that he did have to have his elbow drained.  This resolved his issue and has not had any problems since.  He does use his rowing machine 30 to 40 minutes daily and noticed that recently he has been increasing his workout time.  He notes that his pain is mild but is more discomfort. Negative for fevers or chills.  Negative for any known injury to his left elbow.    Review of Systems   Constitutional: Negative for chills and fever.   Gastrointestinal: Negative for nausea and vomiting.   Musculoskeletal: Positive for myalgias. Negative for back pain, falls, joint pain and neck pain.       PMH:   Past Medical History:   Diagnosis Date   • Arrhythmia     Afib   • Cancer (HCC)     Basal cell/ Squamous skin   • Hypertension      ALLERGIES:   Allergies   Allergen Reactions   • Molds & Smuts Unspecified     Sneezing     SURGHX:   Past Surgical History:   Procedure Laterality Date   • OTHER      mohs to nose, back     SOCHX:   Social History     Socioeconomic History   • Marital status:      Spouse name: Not on file   • Number of children: Not on file   • Years of education: Not on file   • Highest education level: Not on file   Occupational History   • Not on file   Tobacco Use   • Smoking status: Never Smoker   • Smokeless tobacco: Never Used   Vaping Use   • Vaping Use: Never used   Substance and Sexual Activity   • Alcohol use: Yes     Alcohol/week: 1.2 oz     Types: 2 Glasses of wine per week     Comment: 2 glasses wine/night   • Drug use: No   • Sexual activity: Yes     Partners: Female   Other Topics Concern   •  "Not on file   Social History Narrative   • Not on file     Social Determinants of Health     Financial Resource Strain:    • Difficulty of Paying Living Expenses:    Food Insecurity:    • Worried About Running Out of Food in the Last Year:    • Ran Out of Food in the Last Year:    Transportation Needs:    • Lack of Transportation (Medical):    • Lack of Transportation (Non-Medical):    Physical Activity:    • Days of Exercise per Week:    • Minutes of Exercise per Session:    Stress:    • Feeling of Stress :    Social Connections:    • Frequency of Communication with Friends and Family:    • Frequency of Social Gatherings with Friends and Family:    • Attends Anabaptism Services:    • Active Member of Clubs or Organizations:    • Attends Club or Organization Meetings:    • Marital Status:    Intimate Partner Violence:    • Fear of Current or Ex-Partner:    • Emotionally Abused:    • Physically Abused:    • Sexually Abused:      FH:   Family History   Problem Relation Age of Onset   • Cancer Mother         bladder non-smoker   • Dementia Father    • Cancer Brother         prostate age 52         Objective:   /80 (BP Location: Right arm, Patient Position: Sitting, BP Cuff Size: Adult)   Pulse (!) 59   Temp 36.3 °C (97.4 °F) (Temporal)   Resp 15   Ht 1.956 m (6' 5\")   Wt 107 kg (235 lb)   SpO2 98%   BMI 27.87 kg/m²     Physical Exam  Vitals and nursing note reviewed.   Constitutional:       General: He is not in acute distress.     Appearance: Normal appearance. He is normal weight. He is not ill-appearing or toxic-appearing.   HENT:      Head: Normocephalic.      Right Ear: External ear normal.      Left Ear: External ear normal.      Nose: Nose normal.      Mouth/Throat:      Mouth: Mucous membranes are moist.   Eyes:      General:         Right eye: No discharge.         Left eye: No discharge.      Extraocular Movements: Extraocular movements intact.      Conjunctiva/sclera: Conjunctivae normal.      " Pupils: Pupils are equal, round, and reactive to light.   Pulmonary:      Effort: Pulmonary effort is normal.      Breath sounds: Normal breath sounds.   Abdominal:      General: Abdomen is flat.   Musculoskeletal:         General: Normal range of motion.      Cervical back: Normal range of motion and neck supple. No rigidity.      Comments: Positive for swelling and discomfort with palpation to left elbow.  Fluctuance present.  Negative for erythema.  Skin is warm to touch.   Lymphadenopathy:      Cervical: No cervical adenopathy.   Skin:     General: Skin is warm and dry.   Neurological:      General: No focal deficit present.      Mental Status: He is alert and oriented to person, place, and time. Mental status is at baseline.   Psychiatric:         Mood and Affect: Mood normal.         Behavior: Behavior normal.         Judgment: Judgment normal.         Assessment/Plan:   Assessment    1. Olecranon bursitis of left elbow       Local anesthetic spray was applied for pain relief.  His elbow was then cleansed with 3 iodine swabs.  Sterile 18-gauge needle inserted to area of fluctuance and returned 20 cc of serosanguineous fluid.  He tolerated this well.  No blood loss present.  Wound was then cleansed with normal saline and bandaged with Coban.  No indication for antibiotic treatment at this time.  Patient to return for worsening or persistent symptoms.  AVS handout given and reviewed with patient. Pt educated on red flags and when to seek treatment back in ER or UC.

## 2021-10-12 ENCOUNTER — HOSPITAL ENCOUNTER (OUTPATIENT)
Facility: MEDICAL CENTER | Age: 71
End: 2021-10-12
Attending: PHYSICIAN ASSISTANT
Payer: MEDICARE

## 2021-10-12 ENCOUNTER — OFFICE VISIT (OUTPATIENT)
Dept: URGENT CARE | Facility: CLINIC | Age: 71
End: 2021-10-12
Payer: MEDICARE

## 2021-10-12 ENCOUNTER — TELEPHONE (OUTPATIENT)
Dept: CARDIOLOGY | Facility: MEDICAL CENTER | Age: 71
End: 2021-10-12

## 2021-10-12 VITALS
TEMPERATURE: 97.4 F | WEIGHT: 235 LBS | HEART RATE: 54 BPM | HEIGHT: 77 IN | RESPIRATION RATE: 20 BRPM | DIASTOLIC BLOOD PRESSURE: 70 MMHG | OXYGEN SATURATION: 98 % | BODY MASS INDEX: 27.75 KG/M2 | SYSTOLIC BLOOD PRESSURE: 124 MMHG

## 2021-10-12 DIAGNOSIS — M70.22 OLECRANON BURSITIS OF LEFT ELBOW: ICD-10-CM

## 2021-10-12 DIAGNOSIS — Z92.29 HX OF LONG TERM USE OF BLOOD THINNERS: ICD-10-CM

## 2021-10-12 DIAGNOSIS — I48.0 PAROXYSMAL ATRIAL FIBRILLATION (HCC): ICD-10-CM

## 2021-10-12 PROCEDURE — 87205 SMEAR GRAM STAIN: CPT

## 2021-10-12 PROCEDURE — 99214 OFFICE O/P EST MOD 30 MIN: CPT | Performed by: PHYSICIAN ASSISTANT

## 2021-10-12 PROCEDURE — 87070 CULTURE OTHR SPECIMN AEROBIC: CPT

## 2021-10-12 ASSESSMENT — ENCOUNTER SYMPTOMS: JOINT SWELLING: 1

## 2021-10-12 ASSESSMENT — FIBROSIS 4 INDEX: FIB4 SCORE: 1.47

## 2021-10-12 NOTE — PROGRESS NOTES
"Subjective     Gopal Mallory Jr. is a 71 y.o. male who presents with Elbow Swelling (last visit: 10/08/2021, Lt elbow, better today, refilled liquid in it, requesting another drainage)            Patient is a 71-year-old male who presents to urgent care with continued left elbow swelling.  Patient reports recent visit on October 8 for olecranon bursitis of which was drained at that time of which she continue to ice the area throughout the day along with utilization of compression bandage.  He reports that since then the elbow has slowly refilled.  He denies notable to pain to the region other than slight discomfort with pressure to the area.  He does feel that the original injury may have been secondary to increased duration with throwing.  Of note patient reports being on Eliquis at this time as well.  Of note he denies any increase in warmth or surrounding redness.  Also denies recent body aches or fevers.    Other  This is a new problem. The current episode started in the past 7 days. The problem occurs constantly. The problem has been gradually worsening. Associated symptoms include joint swelling. Nothing aggravates the symptoms. He has tried ice (As above) for the symptoms. The treatment provided mild relief.       Review of Systems   Musculoskeletal: Positive for joint swelling.        Left elbow swelling   All other systems reviewed and are negative.             Objective     /70 (BP Location: Right arm, Patient Position: Sitting, BP Cuff Size: Adult long)   Pulse (!) 54   Temp 36.3 °C (97.4 °F) (Temporal)   Resp 20   Ht 1.956 m (6' 5\")   Wt 107 kg (235 lb)   SpO2 98%   BMI 27.87 kg/m²      Physical Exam  Vitals reviewed.   Constitutional:       General: He is not in acute distress.     Appearance: He is well-developed.   HENT:      Head: Normocephalic and atraumatic.   Eyes:      Conjunctiva/sclera: Conjunctivae normal.      Pupils: Pupils are equal, round, and reactive to light. "   Neck:      Trachea: No tracheal deviation.   Cardiovascular:      Rate and Rhythm: Bradycardia present.   Pulmonary:      Effort: Pulmonary effort is normal.   Musculoskeletal:      Cervical back: Normal range of motion and neck supple.   Neurological:      Mental Status: He is alert and oriented to person, place, and time.      Coordination: Coordination normal.   Psychiatric:         Behavior: Behavior normal.         Thought Content: Thought content normal.         Judgment: Judgment normal.       Left elbow: Notable swelling to the olecranon bursa.  Without increased warmth or surrounding redness.  Full range of motion with extension, flexion and supination pronation.  Without bony tenderness.    Olecranon bursa drainage:    Sterile procedure throughout-Betadine x3 was utilized, Cold-Eeze utilized for mild anesthesia, 18-gauge needle with syringe used at the most fluctuant region of the olecranon bursa.  Approximately 14 cc were aspirated from this region.  Serous sanguinous-bloody fluid was removed.  This was sent for culture.  Pressure bandage was applied to this region.  Patient tolerated well.                              Assessment & Plan        1. Olecranon bursitis of left elbow  - FLUID CULTURE W/GRAM STAIN; Future    2. Hx of long term use of blood thinners            We will send off for culture at this time.  Patient is to keep the area compressed utilization of ice.  If area continues to fill with fluid patient would benefit from orthopedist-sports medicine referral.  I do not feel that this is secondary to infectious processes patient is with minimal discomfort, without redness, painful movement etc.  I will follow-up with this patient via MyChart as results return.  Appropriate PPE worn at all times by provider.   Pt. Had face mask on throughout entirety of the visit other than oropharyngeal examination today.       DDX, Supportive care, and indications for immediate follow-up discussed with  patient.    Instructed to return to clinic or nearest emergency department if we are not available for any change in condition, further concerns, or worsening of symptoms.    The patient and/or guardian demonstrated a good understanding and agreed with the treatment plan.    Please note that this dictation was created using voice recognition software. I have made every reasonable attempt to correct obvious errors, but I expect that there are errors of grammar and possibly content that I did not discover before finalizing the note.

## 2021-10-12 NOTE — TELEPHONE ENCOUNTER
BE    Pt called. Said he needs refill for:   apixaban (ELIQUIS) 5mg Tab [[id number]. I am showing this as discontinued.   Down to last 2 pills.     Gopal - [phone]    Thank you,   Tanisha CLARK

## 2021-10-13 DIAGNOSIS — M70.22 OLECRANON BURSITIS OF LEFT ELBOW: ICD-10-CM

## 2021-10-14 LAB
GRAM STN SPEC: NORMAL
SIGNIFICANT IND 70042: NORMAL
SITE SITE: NORMAL
SOURCE SOURCE: NORMAL

## 2021-10-16 LAB
BACTERIA FLD AEROBE CULT: NORMAL
GRAM STN SPEC: NORMAL
SIGNIFICANT IND 70042: NORMAL
SITE SITE: NORMAL
SOURCE SOURCE: NORMAL

## 2021-10-19 DIAGNOSIS — Z79.01 CHRONIC ANTICOAGULATION: ICD-10-CM

## 2021-10-20 ENCOUNTER — OFFICE VISIT (OUTPATIENT)
Dept: MEDICAL GROUP | Facility: MEDICAL CENTER | Age: 71
End: 2021-10-20
Payer: MEDICARE

## 2021-10-20 VITALS
HEIGHT: 77 IN | OXYGEN SATURATION: 99 % | DIASTOLIC BLOOD PRESSURE: 80 MMHG | TEMPERATURE: 97.5 F | HEART RATE: 53 BPM | SYSTOLIC BLOOD PRESSURE: 130 MMHG | WEIGHT: 235.78 LBS | BODY MASS INDEX: 27.84 KG/M2

## 2021-10-20 DIAGNOSIS — M25.422 EFFUSION OF LEFT ELBOW: ICD-10-CM

## 2021-10-20 DIAGNOSIS — Z23 NEED FOR VACCINATION: ICD-10-CM

## 2021-10-20 PROCEDURE — 99213 OFFICE O/P EST LOW 20 MIN: CPT | Mod: 25 | Performed by: INTERNAL MEDICINE

## 2021-10-20 PROCEDURE — 90662 IIV NO PRSV INCREASED AG IM: CPT | Performed by: INTERNAL MEDICINE

## 2021-10-20 PROCEDURE — G0008 ADMIN INFLUENZA VIRUS VAC: HCPCS | Performed by: INTERNAL MEDICINE

## 2021-10-20 ASSESSMENT — FIBROSIS 4 INDEX: FIB4 SCORE: 1.47

## 2021-10-20 NOTE — PROGRESS NOTES
Subjective:     CC: recurrent left elbow swelling     HISTORY OF THE PRESENT ILLNESS: Patient is a 71 y.o. male. This pleasant patient is here today to  discuss his recurrent left elbow effusion.    Problem   Effusion of Left Elbow    Recurrent left elbow effusion started around 10/01/2021.  Patient has been using his rowing machine couple days a week for approximately 1 hour and has been doing longer sessions recently.  He currently has no pain in his left elbow, moderate effusion present, no skin changes or temperature changes above left elbow identified.  Patient denies fever, chills, tingling numbness in his fingers.  He denies any pain in his left elbow, however states there is a discomfort when he is putting his elbow on the armrest.  He was able to play golf yesterday.  Patient denies any history of gout, inflammatory arthropathies or rheumatologic diseases.           Past Medical History:   Diagnosis Date   • Arrhythmia     Afib   • Cancer (HCC)     Basal cell/ Squamous skin   • Hypertension      Past Surgical History:   Procedure Laterality Date   • OTHER      mohs to nose, back     Family History   Problem Relation Age of Onset   • Cancer Mother         bladder non-smoker   • Dementia Father    • Cancer Brother         prostate age 52     Social History     Tobacco Use   • Smoking status: Never Smoker   • Smokeless tobacco: Never Used   Vaping Use   • Vaping Use: Never used   Substance Use Topics   • Alcohol use: Yes     Alcohol/week: 1.2 oz     Types: 2 Glasses of wine per week     Comment: 2 glasses wine/night   • Drug use: No     Current Outpatient Medications Ordered in Epic   Medication Sig Dispense Refill   • apixaban (ELIQUIS) 5mg Tab TAKE 1 TABLET BY MOUTH TWICE A  Tablet 3   • Cyanocobalamin (B-12 PO) Take  by mouth.     • Zinc Oxide-Vitamin C (ZINC PLUS VITAMIN C PO) Take  by mouth.     • carbidopa-levodopa (SINEMET)  MG Tab TAKE 1 TABLET BY MOUTH TWICE A  Tablet 3   •  "rasagiline (AZILECT) 1 MG Tab Take 1 Tablet by mouth every day. 90 Tablet 3   • omeprazole (PRILOSEC) 20 MG delayed-release capsule TAKE 1 CAPSULE BY MOUTH EVERY DAY 90 capsule 2   • triamcinolone acetonide (KENALOG) 0.1 % Cream AAA abdomen BID PRN rash.  Avoid use on sensitive sites.  If use on face, nose; limit to sparing amount 2X/week 60 g 1   • fluticasone (FLONASE) 50 MCG/ACT nasal spray Spray 1 Spray in nose 1 time daily as needed (ALLERGY).     • acetaminophen (TYLENOL) 325 MG Tab Take 325 mg by mouth every four hours as needed for Moderate Pain. (Patient not taking: Reported on 10/12/2021)       No current [facility]-ordered facility-administered medications on file.     Health Maintenance: Patient is due for influenza vaccine, interested in getting one today.     ROS:   Gen: no fevers/chills  Pulm: no sob  MSk: no myalgias  Neuro: no numbness or tingling in upper extremities    Objective:   Exam: /80 (BP Location: Left arm, Patient Position: Sitting, BP Cuff Size: Large adult)   Pulse (!) 53   Temp 36.4 °C (97.5 °F) (Temporal)   Ht 1.956 m (6' 5\")   Wt 107 kg (235 lb 12.5 oz)   SpO2 99%  Body mass index is 27.96 kg/m².    General: Normal appearing. No distress.  HEENT: Normocephalic. Eyes conjunctiva clear lids without ptosis,oropharynx is without erythema, edema or exudates.   Skin: Warm and dry.  No obvious lesions.  Musculoskeletal: Elbow Musculoskeletal Exam    Inspection      Inspection - Right        Right elbow inspection is normal.      Inspection - Left        Ecchymosis: none      Swelling: effusion      Deformity: none    Palpation      Palpation - Left        Crepitus (radiocapitellar): none      Tenderness: none    Range of Motion      Range of Motion - Left        Left elbow range of motion is normal.      Strength      Strength - Left        Left elbow strength is normal.      Neurovascular      Neurovascular - Left        Left elbow nerve sensation is normal.    Special Tests      " Special Tests - Left        Neurological Signs - Left        Intrinsic atrophy: negative      Hypothenar: negative      Normal gait. No lower extremity cyanosis, clubbing, or edema.  Psych: Normal mood and affect. Alert and oriented x3. Judgment and insight is normal.    Labs: I have reviewed results of the synovial culture and Gram stain.    Assessment & Plan:   71 y.o. male with the following -    Problem List Items Addressed This Visit     Effusion of left elbow     I have no suspicion of septic arthritis.  S/p aspiration at Urgent care on 10/08, 10/12/2021.   Culture and Gram stain were negative for any bacteria, rare WBCs.  Since effusion has been drained twice already I will refer patient to orthopedic surgeon for further management and evaluation.  Patient will continue icing his elbow, avoiding rowing and repetitive motion.         Relevant Orders    REFERRAL TO ORTHOPEDICS      Other Visit Diagnoses     Need for vaccination        Relevant Orders    INFLUENZA VACCINE, HIGH DOSE (65+ ONLY) (Completed)      I have discussed emergency room precautions and symptoms and signs of septic arthritis .  Patient is aware of potential symptoms and complications and will go to Urgent care/ER if those symptoms were to develop.    I have reviewed notes from urgent care visit on 10/08 and 10/12/2021.    Return if symptoms worsen or fail to improve.    Please note that this dictation was created using voice recognition software. I have made every reasonable attempt to correct obvious errors, but I expect that there are errors of grammar and possibly content that I did not discover before finalizing the note.

## 2021-10-20 NOTE — ASSESSMENT & PLAN NOTE
I have no suspicion of septic arthritis.  S/p aspiration at Urgent care on 10/08, 10/12/2021.   Culture and Gram stain were negative for any bacteria, rare WBCs.  Since effusion has been drained twice already I will refer patient to orthopedic surgeon for further management and evaluation.  Patient will continue icing his elbow, avoiding rowing and repetitive motion.

## 2021-11-01 ENCOUNTER — TELEPHONE (OUTPATIENT)
Dept: CARDIOLOGY | Facility: MEDICAL CENTER | Age: 71
End: 2021-11-01

## 2021-11-01 NOTE — LETTER
PROCEDURE/SURGERY CLEARANCE FORM      Encounter Date: 11/3/2021    Patient: Gopal Mallory Jr.  YOB: 1950    CARDIOLOGIST:  Hira Starr MD    REFERRING DOCTOR:  Rome Deutsch DMD      The above patient is ok to proceed to have the following procedure/surgery: Dental extraction and bone graft with local anesthesia.                                            Additional comments: Ok to hold Eliquis 2 days prior to procedure. No cardiac indication for prophylactic antibiotics.         Hira Starr MD    Electronically Signed

## 2021-11-02 NOTE — TELEPHONE ENCOUNTER
Received clearance request from [facility] for pt's extraction and bone graft with local anesthesia. Procedure date not mentioned. They would like pt to hold Eliquis 2 days prior to procedure.

## 2021-11-03 NOTE — TELEPHONE ENCOUNTER
You  Hira Starr M.D. 2 days ago     Ok to proceed with extraction/bone graft and hold Eliquis 2 days prior?   Thanks!          Hira Starr M.D.  You 2 days ago     Yes can hold eliquis for surgery     Thanks   BE      Letter drafted and faxed to [phone]. Receipt confirmed.

## 2021-11-16 ENCOUNTER — APPOINTMENT (OUTPATIENT)
Dept: MEDICAL GROUP | Facility: MEDICAL CENTER | Age: 71
End: 2021-11-16
Payer: MEDICARE

## 2021-11-29 ENCOUNTER — PATIENT MESSAGE (OUTPATIENT)
Dept: CARDIOLOGY | Facility: MEDICAL CENTER | Age: 71
End: 2021-11-29

## 2021-12-01 NOTE — PATIENT COMMUNICATION
Yobany Gray M.D.  Addis Maher R.N. 17 hours ago (10:46 PM)         Please have pt come in for ECG

## 2021-12-02 ENCOUNTER — OFFICE VISIT (OUTPATIENT)
Dept: MEDICAL GROUP | Facility: MEDICAL CENTER | Age: 71
End: 2021-12-02
Payer: MEDICARE

## 2021-12-02 VITALS
WEIGHT: 234.57 LBS | HEIGHT: 72 IN | TEMPERATURE: 97.7 F | HEART RATE: 76 BPM | DIASTOLIC BLOOD PRESSURE: 70 MMHG | OXYGEN SATURATION: 98 % | SYSTOLIC BLOOD PRESSURE: 120 MMHG | BODY MASS INDEX: 31.77 KG/M2

## 2021-12-02 DIAGNOSIS — R73.01 IMPAIRED FASTING GLUCOSE: ICD-10-CM

## 2021-12-02 DIAGNOSIS — Z85.828 HISTORY OF BASAL CELL CARCINOMA (BCC) OF SKIN: ICD-10-CM

## 2021-12-02 DIAGNOSIS — I48.0 PAROXYSMAL ATRIAL FIBRILLATION (HCC): ICD-10-CM

## 2021-12-02 DIAGNOSIS — K21.9 GASTROESOPHAGEAL REFLUX DISEASE WITHOUT ESOPHAGITIS: ICD-10-CM

## 2021-12-02 DIAGNOSIS — Z12.5 SCREENING FOR MALIGNANT NEOPLASM OF PROSTATE: ICD-10-CM

## 2021-12-02 DIAGNOSIS — I10 ESSENTIAL HYPERTENSION: ICD-10-CM

## 2021-12-02 DIAGNOSIS — G20.C PRIMARY PARKINSONISM (HCC): ICD-10-CM

## 2021-12-02 DIAGNOSIS — Z79.01 LONG TERM (CURRENT) USE OF ANTICOAGULANTS: ICD-10-CM

## 2021-12-02 PROCEDURE — 99214 OFFICE O/P EST MOD 30 MIN: CPT | Performed by: INTERNAL MEDICINE

## 2021-12-02 RX ORDER — VALSARTAN 80 MG/1
80 TABLET ORAL EVERY EVENING
COMMUNITY
Start: 2021-11-10 | End: 2022-05-10

## 2021-12-02 RX ORDER — AMLODIPINE BESYLATE 10 MG/1
20 TABLET ORAL DAILY
COMMUNITY
End: 2022-01-18

## 2021-12-02 SDOH — ECONOMIC STABILITY: FOOD INSECURITY: WITHIN THE PAST 12 MONTHS, THE FOOD YOU BOUGHT JUST DIDN'T LAST AND YOU DIDN'T HAVE MONEY TO GET MORE.: NEVER TRUE

## 2021-12-02 SDOH — ECONOMIC STABILITY: HOUSING INSECURITY: IN THE LAST 12 MONTHS, HOW MANY PLACES HAVE YOU LIVED?: 1

## 2021-12-02 SDOH — ECONOMIC STABILITY: HOUSING INSECURITY
IN THE LAST 12 MONTHS, WAS THERE A TIME WHEN YOU DID NOT HAVE A STEADY PLACE TO SLEEP OR SLEPT IN A SHELTER (INCLUDING NOW)?: NO

## 2021-12-02 SDOH — ECONOMIC STABILITY: INCOME INSECURITY: HOW HARD IS IT FOR YOU TO PAY FOR THE VERY BASICS LIKE FOOD, HOUSING, MEDICAL CARE, AND HEATING?: NOT HARD AT ALL

## 2021-12-02 SDOH — ECONOMIC STABILITY: FOOD INSECURITY: WITHIN THE PAST 12 MONTHS, YOU WORRIED THAT YOUR FOOD WOULD RUN OUT BEFORE YOU GOT MONEY TO BUY MORE.: NEVER TRUE

## 2021-12-02 SDOH — HEALTH STABILITY: PHYSICAL HEALTH: ON AVERAGE, HOW MANY MINUTES DO YOU ENGAGE IN EXERCISE AT THIS LEVEL?: 50 MIN

## 2021-12-02 SDOH — HEALTH STABILITY: PHYSICAL HEALTH: ON AVERAGE, HOW MANY DAYS PER WEEK DO YOU ENGAGE IN MODERATE TO STRENUOUS EXERCISE (LIKE A BRISK WALK)?: 4 DAYS

## 2021-12-02 SDOH — ECONOMIC STABILITY: TRANSPORTATION INSECURITY
IN THE PAST 12 MONTHS, HAS LACK OF TRANSPORTATION KEPT YOU FROM MEETINGS, WORK, OR FROM GETTING THINGS NEEDED FOR DAILY LIVING?: NO

## 2021-12-02 SDOH — ECONOMIC STABILITY: TRANSPORTATION INSECURITY
IN THE PAST 12 MONTHS, HAS THE LACK OF TRANSPORTATION KEPT YOU FROM MEDICAL APPOINTMENTS OR FROM GETTING MEDICATIONS?: NO

## 2021-12-02 SDOH — ECONOMIC STABILITY: TRANSPORTATION INSECURITY
IN THE PAST 12 MONTHS, HAS LACK OF RELIABLE TRANSPORTATION KEPT YOU FROM MEDICAL APPOINTMENTS, MEETINGS, WORK OR FROM GETTING THINGS NEEDED FOR DAILY LIVING?: NO

## 2021-12-02 SDOH — HEALTH STABILITY: MENTAL HEALTH
STRESS IS WHEN SOMEONE FEELS TENSE, NERVOUS, ANXIOUS, OR CAN'T SLEEP AT NIGHT BECAUSE THEIR MIND IS TROUBLED. HOW STRESSED ARE YOU?: ONLY A LITTLE

## 2021-12-02 SDOH — ECONOMIC STABILITY: INCOME INSECURITY: IN THE LAST 12 MONTHS, WAS THERE A TIME WHEN YOU WERE NOT ABLE TO PAY THE MORTGAGE OR RENT ON TIME?: NO

## 2021-12-02 ASSESSMENT — SOCIAL DETERMINANTS OF HEALTH (SDOH)
IN A TYPICAL WEEK, HOW MANY TIMES DO YOU TALK ON THE PHONE WITH FAMILY, FRIENDS, OR NEIGHBORS?: MORE THAN THREE TIMES A WEEK
HOW OFTEN DO YOU HAVE A DRINK CONTAINING ALCOHOL: 4 OR MORE TIMES A WEEK
HOW OFTEN DO YOU ATTENT MEETINGS OF THE CLUB OR ORGANIZATION YOU BELONG TO?: MORE THAN 4 TIMES PER YEAR
HOW OFTEN DO YOU ATTEND CHURCH OR RELIGIOUS SERVICES?: NEVER
HOW MANY DRINKS CONTAINING ALCOHOL DO YOU HAVE ON A TYPICAL DAY WHEN YOU ARE DRINKING: 1 OR 2
HOW OFTEN DO YOU ATTEND CHURCH OR RELIGIOUS SERVICES?: NEVER
HOW OFTEN DO YOU GET TOGETHER WITH FRIENDS OR RELATIVES?: MORE THAN THREE TIMES A WEEK
WITHIN THE PAST 12 MONTHS, YOU WORRIED THAT YOUR FOOD WOULD RUN OUT BEFORE YOU GOT THE MONEY TO BUY MORE: NEVER TRUE
DO YOU BELONG TO ANY CLUBS OR ORGANIZATIONS SUCH AS CHURCH GROUPS UNIONS, FRATERNAL OR ATHLETIC GROUPS, OR SCHOOL GROUPS?: YES
HOW OFTEN DO YOU HAVE SIX OR MORE DRINKS ON ONE OCCASION: LESS THAN MONTHLY
HOW OFTEN DO YOU GET TOGETHER WITH FRIENDS OR RELATIVES?: MORE THAN THREE TIMES A WEEK
IN A TYPICAL WEEK, HOW MANY TIMES DO YOU TALK ON THE PHONE WITH FAMILY, FRIENDS, OR NEIGHBORS?: MORE THAN THREE TIMES A WEEK
HOW OFTEN DO YOU ATTENT MEETINGS OF THE CLUB OR ORGANIZATION YOU BELONG TO?: MORE THAN 4 TIMES PER YEAR
DO YOU BELONG TO ANY CLUBS OR ORGANIZATIONS SUCH AS CHURCH GROUPS UNIONS, FRATERNAL OR ATHLETIC GROUPS, OR SCHOOL GROUPS?: YES
HOW HARD IS IT FOR YOU TO PAY FOR THE VERY BASICS LIKE FOOD, HOUSING, MEDICAL CARE, AND HEATING?: NOT HARD AT ALL

## 2021-12-02 ASSESSMENT — FIBROSIS 4 INDEX: FIB4 SCORE: 1.47

## 2021-12-02 ASSESSMENT — LIFESTYLE VARIABLES
HOW MANY STANDARD DRINKS CONTAINING ALCOHOL DO YOU HAVE ON A TYPICAL DAY: 1 OR 2
HOW OFTEN DO YOU HAVE SIX OR MORE DRINKS ON ONE OCCASION: LESS THAN MONTHLY
HOW OFTEN DO YOU HAVE A DRINK CONTAINING ALCOHOL: 4 OR MORE TIMES A WEEK

## 2021-12-03 NOTE — ASSESSMENT & PLAN NOTE
He will continue close follow-up with dermatology every 6 months.  Per Dermatology note: he  was offered excision of the lesion of his right ear canal, declined.

## 2021-12-03 NOTE — PROGRESS NOTES
Subjective:     Chief Complaint   Patient presents with   • Establish Care       Diagnoses of Paroxysmal atrial fibrillation (HCC), Essential hypertension, Screening for malignant neoplasm of prostate, Gastroesophageal reflux disease without esophagitis, History of basal cell carcinoma (BCC) of skin, Impaired fasting glucose, Long term (current) use of anticoagulants, and Primary parkinsonism (HCC) were pertinent to this visit.    HISTORY OF THE PRESENT ILLNESS: Patient is a 71 y.o. male. This pleasant patient is here today to establish care and discuss his atrial fibrillation. His prior PCP was Dr. Culp.    Problem   Long Term (Current) Use of Anticoagulants    Patient is on chronic anticoagulation with Eliquis 5 mg tablet twice a day.     Primary Parkinsonism (Hcc)    This is a chronic condition, managed by neurology.  Currently patient is on Sinemet  mg and rasagiline 1 mg daily.     Gastroesophageal Reflux Disease Without Esophagitis    Barium swallow 8/12/2019 did show signs of GERD without stricture or mass.  Per chart review no EGD was recommended.     Impaired Fasting Glucose    Lab Results   Component Value Date/Time    HBA1C 5.5 02/01/2021 09:48 AM           Paroxysmal Atrial Fibrillation (Hcc)    This is a chronic condition, currently managed by cardiology Dr. Yobany Gray.  Echo 4/9/19 normal ejection fraction 60%, mild LVH, mild RV enlargement with no significant tricuspid regurg, normal left atrium, trace MR.     While hiking on 11/28/2021 he started experiencing irregular heartbeats.  He checked his readings with cardio mobile device and it appeared to be irregular.  His rate was approximately 100 and that time. He contacted his cardiologist and he was advised to be evaluated with formal EKG and to continue Amiodarone PO.    Currently he denies any chest pain, shortness of breath, dizziness and feeling fine overall.    EKG Interpretation   Ordered and interpreted by Erin Madden,  MD  Rhythm: atrial fibrillation  Rate: 67   Ectopy: none   Conduction: normal   ST Segments:no acute change   T Waves: no acute change   Q Waves: none   Clinical Impression: Atrial fibrillation         History of Basal Cell Carcinoma (Bcc) of Skin    Follows with dermatology every 6 months.  Dermatology note briefly reviewed: BCC several years ago hairline, BCCs on the forehead, nose, face also squamous cell carcinoma on the back approximately 7 years ago.       Essential Hypertension    This is a chronic condition, well-controlled valsartan 80 mg tablet daily and amlodipine.   Patient has no anginal or strokelike symptoms.  Advised low-salt diet and exercise.       Past Medical History:   Diagnosis Date   • Arrhythmia     Afib   • Cancer (HCC)     Basal cell/ Squamous skin   • Hypertension      Past Surgical History:   Procedure Laterality Date   • OTHER      mohs to nose, back     Family History   Problem Relation Age of Onset   • Cancer Mother         bladder non-smoker   • Dementia Father    • Cancer Brother         prostate age 52     Social History     Tobacco Use   • Smoking status: Never Smoker   • Smokeless tobacco: Never Used   Vaping Use   • Vaping Use: Never used   Substance Use Topics   • Alcohol use: Yes     Alcohol/week: 1.2 oz     Types: 2 Glasses of wine per week     Comment: 2 glasses wine/night   • Drug use: No     Current Outpatient Medications Ordered in Epic   Medication Sig Dispense Refill   • amLODIPine (NORVASC) 10 MG Tab Take 20 mg by mouth every day.     • apixaban (ELIQUIS) 5mg Tab TAKE 1 TABLET BY MOUTH TWICE A  Tablet 3   • Cyanocobalamin (B-12 PO) Take  by mouth.     • Zinc Oxide-Vitamin C (ZINC PLUS VITAMIN C PO) Take  by mouth.     • carbidopa-levodopa (SINEMET)  MG Tab TAKE 1 TABLET BY MOUTH TWICE A  Tablet 3   • omeprazole (PRILOSEC) 20 MG delayed-release capsule TAKE 1 CAPSULE BY MOUTH EVERY DAY 90 capsule 2   • fluticasone (FLONASE) 50 MCG/ACT nasal spray  Spray 1 Spray in nose 1 time daily as needed (ALLERGY).     • valsartan (DIOVAN) 80 MG Tab      • rasagiline (AZILECT) 1 MG Tab Take 1 Tablet by mouth every day. 90 Tablet 3   • triamcinolone acetonide (KENALOG) 0.1 % Cream AAA abdomen BID PRN rash.  Avoid use on sensitive sites.  If use on face, nose; limit to sparing amount 2X/week (Patient not taking: Reported on 12/2/2021) 60 g 1     No current [facility]-ordered facility-administered medications on file.     Health Maintenance: Patient is due for Shingrix and Tdap vaccine.    ROS:   Gen: no fevers/chills  Eyes: no changes in vision  ENT: no sore throat  Pulm: no sob, no cough  CV: no chest pain, no palpitations  GI: no nausea/vomiting      Objective:     Exam: /70 (BP Location: Left arm, Patient Position: Sitting, BP Cuff Size: Adult)   Pulse 76   Temp 36.5 °C (97.7 °F) (Temporal)   Ht 1.829 m (6')   Wt 106 kg (234 lb 9.1 oz)   SpO2 98%  Body mass index is 31.81 kg/m².    General: Normal appearing. No distress.  HEENT: Normocephalic.  Hypomimic, eyes conjunctiva clear lids without ptosis, pupils equal and reactive to light accommodation, ears normal shape and contour, oropharynx is without erythema, edema or exudates.   Pulmonary: Clear to ausculation.  Normal effort. No rales, ronchi, or wheezing.  Cardiovascular: Irregularly irregular rhythm and rate.  No murmurs or gallops.  Lymph: No cervical or supraclavicular lymph nodes are palpable  Skin: Warm and dry.  No obvious lesions.  Musculoskeletal: Normal gait. No extremity cyanosis, clubbing, or edema.  Psych: Normal mood and affect. Alert and oriented x3. Judgment and insight is normal.    Labs: Reviewed PCP, lipid panel    Assessment & Plan:   71 y.o. male with the following -    Problem List Items Addressed This Visit     Essential hypertension     Chronic condition, well-controlled with losartan and amlodipine 20 mg. Will continue         Relevant Medications    amLODIPine (NORVASC) 10 MG Tab     valsartan (DIOVAN) 80 MG Tab    Other Relevant Orders    Comp Metabolic Panel    Lipid Profile    CBC WITH DIFFERENTIAL    Gastroesophageal reflux disease without esophagitis     He will continue taking omeprazole 20 mg delayed release.         History of basal cell carcinoma (BCC) of skin     He will continue close follow-up with dermatology every 6 months.  Per Dermatology note: he  was offered excision of the lesion of his right ear canal, declined.         Impaired fasting glucose     Low-carb diet advised         Long term (current) use of anticoagulants     Continue Eliquis         Paroxysmal atrial fibrillation (HCC)     Chronic condition, currently in atrial fibrillation, rate controlled and anticoagulated with Eliquis.  Patient has no anginal symptoms or strokelike symptoms.  He is aware of ER precautions.  Will forward EKG to cardiology Dr. Yobany Gray for review.         Relevant Medications    amLODIPine (NORVASC) 10 MG Tab    valsartan (DIOVAN) 80 MG Tab    Other Relevant Orders    EKG - Clinic Performed    Primary parkinsonism (HCC)     Chronic and fairly well controlled.  Continue current regimen.           Other Visit Diagnoses     Screening for malignant neoplasm of prostate        Relevant Orders    PROSTATE SPECIFIC AG SCREENING        Return in about 4 weeks (around 12/30/2021).    Please note that this dictation was created using voice recognition software. I have made every reasonable attempt to correct obvious errors, but I expect that there are errors of grammar and possibly content that I did not discover before finalizing the note.

## 2021-12-03 NOTE — ASSESSMENT & PLAN NOTE
Chronic condition, currently in atrial fibrillation, rate controlled and anticoagulated with Eliquis.  Patient has no anginal symptoms or strokelike symptoms.  He is aware of ER precautions.  Will forward EKG to cardiology Dr. Yobany Gray for review.

## 2021-12-16 ENCOUNTER — OFFICE VISIT (OUTPATIENT)
Dept: CARDIOLOGY | Facility: MEDICAL CENTER | Age: 71
End: 2021-12-16
Payer: MEDICARE

## 2021-12-16 VITALS
OXYGEN SATURATION: 100 % | RESPIRATION RATE: 12 BRPM | DIASTOLIC BLOOD PRESSURE: 76 MMHG | HEIGHT: 72 IN | WEIGHT: 236 LBS | SYSTOLIC BLOOD PRESSURE: 138 MMHG | BODY MASS INDEX: 31.97 KG/M2 | HEART RATE: 66 BPM

## 2021-12-16 DIAGNOSIS — I48.0 PAROXYSMAL ATRIAL FIBRILLATION (HCC): ICD-10-CM

## 2021-12-16 LAB — EKG IMPRESSION: NORMAL

## 2021-12-16 PROCEDURE — 93000 ELECTROCARDIOGRAM COMPLETE: CPT | Performed by: INTERNAL MEDICINE

## 2021-12-16 PROCEDURE — 99215 OFFICE O/P EST HI 40 MIN: CPT | Performed by: INTERNAL MEDICINE

## 2021-12-16 RX ORDER — AMIODARONE HYDROCHLORIDE 200 MG/1
200 TABLET ORAL DAILY
COMMUNITY
End: 2021-12-16

## 2021-12-16 ASSESSMENT — FIBROSIS 4 INDEX: FIB4 SCORE: 1.47

## 2021-12-16 NOTE — PROGRESS NOTES
Arrhythmia Clinic Note (Established patient)    DOS: 12/16/2021    Chief complaint/Reason for consult: Afib    Interval History: 72 y/o M with long history of pAF now with persistent afib. Last I saw him he was on amiodarone for rhythm control. We stopped amiodaronoe. He went back into Afib after Thanksgiving and remains in afib since. Feels it worse some days than others. Resumed amiodarone but remains in afib.    ROS (+ highlighted in bold):  Constitutional: Fevers/chills/fatigue/weightloss  HEENT: Blurry vision/eye pain/sore throat/hearing loss  Respiratory: Shortness of breath/cough  Cardiovascular: Chest pain/palpitations/edema/orthopnea/syncope  GI: Nausea/vomitting/diarrhea  MSK: Arthralgias/myagias/muscle weakness  Skin: Rash/sores  Neurological: Numbness/tremors/vertigo  Endocrine: Excessive thirst/polyuria/cold intolerance/heat intolerance  Psych: Depression/anxiety    Past Medical History:   Diagnosis Date   • Arrhythmia     Afib   • Cancer (HCC)     Basal cell/ Squamous skin   • Hypertension        Past Surgical History:   Procedure Laterality Date   • OTHER      mohs to nose, back       Social History     Socioeconomic History   • Marital status:      Spouse name: Not on file   • Number of children: Not on file   • Years of education: Not on file   • Highest education level: Bachelor's degree (e.g., BA, AB, BS)   Occupational History   • Not on file   Tobacco Use   • Smoking status: Never Smoker   • Smokeless tobacco: Never Used   Vaping Use   • Vaping Use: Never used   Substance and Sexual Activity   • Alcohol use: Yes     Alcohol/week: 1.2 oz     Types: 2 Glasses of wine per week     Comment: 2 glasses wine/night   • Drug use: No   • Sexual activity: Yes     Partners: Female   Other Topics Concern   • Not on file   Social History Narrative   • Not on file     Social Determinants of Health     Financial Resource Strain: Low Risk    • Difficulty of Paying Living Expenses: Not hard at all   Food  Insecurity: No Food Insecurity   • Worried About Running Out of Food in the Last Year: Never true   • Ran Out of Food in the Last Year: Never true   Transportation Needs: No Transportation Needs   • Lack of Transportation (Medical): No   • Lack of Transportation (Non-Medical): No   Physical Activity: Sufficiently Active   • Days of Exercise per Week: 4 days   • Minutes of Exercise per Session: 50 min   Stress: No Stress Concern Present   • Feeling of Stress : Only a little   Social Connections: Moderately Integrated   • Frequency of Communication with Friends and Family: More than three times a week   • Frequency of Social Gatherings with Friends and Family: More than three times a week   • Attends Anabaptist Services: Never   • Active Member of Clubs or Organizations: Yes   • Attends Club or Organization Meetings: More than 4 times per year   • Marital Status:    Intimate Partner Violence:    • Fear of Current or Ex-Partner: Not on file   • Emotionally Abused: Not on file   • Physically Abused: Not on file   • Sexually Abused: Not on file   Housing Stability: Low Risk    • Unable to Pay for Housing in the Last Year: No   • Number of Places Lived in the Last Year: 1   • Unstable Housing in the Last Year: No       Family History   Problem Relation Age of Onset   • Cancer Mother         bladder non-smoker   • Dementia Father    • Cancer Brother         prostate age 52       Allergies   Allergen Reactions   • Molds & Smuts Unspecified     Sneezing       Current Outpatient Medications   Medication Sig Dispense Refill   • metoprolol tartrate (LOPRESSOR) 25 MG Tab Take 1 Tablet by mouth 2 times a day. 60 Tablet 11   • amLODIPine (NORVASC) 10 MG Tab Take 20 mg by mouth every day.     • valsartan (DIOVAN) 80 MG Tab      • apixaban (ELIQUIS) 5mg Tab TAKE 1 TABLET BY MOUTH TWICE A  Tablet 3   • Cyanocobalamin (B-12 PO) Take  by mouth.     • Zinc Oxide-Vitamin C (ZINC PLUS VITAMIN C PO) Take  by mouth.     •  carbidopa-levodopa (SINEMET)  MG Tab TAKE 1 TABLET BY MOUTH TWICE A  Tablet 3   • rasagiline (AZILECT) 1 MG Tab Take 1 Tablet by mouth every day. 90 Tablet 3   • omeprazole (PRILOSEC) 20 MG delayed-release capsule TAKE 1 CAPSULE BY MOUTH EVERY DAY 90 capsule 2   • triamcinolone acetonide (KENALOG) 0.1 % Cream AAA abdomen BID PRN rash.  Avoid use on sensitive sites.  If use on face, nose; limit to sparing amount 2X/week 60 g 1   • fluticasone (FLONASE) 50 MCG/ACT nasal spray Spray 1 Spray in nose 1 time daily as needed (ALLERGY).       No current facility-administered medications for this visit.       Physical Exam:  Vitals:    12/16/21 1410   BP: 138/76   BP Location: Left arm   Patient Position: Sitting   BP Cuff Size: Adult   Pulse: 66   Resp: 12   SpO2: 100%   Weight: 107 kg (236 lb)   Height: 1.829 m (6')     General appearance: NAD, conversant   Eyes: anicteric sclerae, moist conjunctivae; no lid-lag; PERRLA  HENT: Atraumatic; oropharynx clear with moist mucous membranes and no mucosal ulcerations; normal hard and soft palate  Neck: Trachea midline; FROM, supple, no thyromegaly or lymphadenopathy  Lungs: CTA, with normal respiratory effort and no intercostal retractions  CV: Irregular, no MRGs, no JVD  Abdomen: Soft, non-tender; no masses or HSM  Extremities: No peripheral edema or extremity lymphadenopathy  Skin: Normal temperature, turgor and texture; no rash, ulcers or subcutaneous nodules  Psych: Appropriate affect, alert and oriented to person, place and time    Data:  Lipids:   Lab Results   Component Value Date/Time    CHOLSTRLTOT 148 02/01/2021 09:48 AM    TRIGLYCERIDE 76 02/01/2021 09:48 AM    HDL 49 02/01/2021 09:48 AM    LDL 84 02/01/2021 09:48 AM        BMP:  Lab Results   Component Value Date/Time    SODIUM 137 02/01/2021 0948    POTASSIUM 4.3 02/01/2021 0948    CHLORIDE 100 02/01/2021 0948    CO2 28 02/01/2021 0948    GLUCOSE 97 02/01/2021 0948    BUN 19 02/01/2021 0948    CREATININE  0.87 02/01/2021 0948    CALCIUM 9.0 02/01/2021 0948    ANION 9.0 02/01/2021 0948        TSH:   Lab Results   Component Value Date/Time    TSHULTRASEN 3.900 10/02/2019 0800        THYROXINE (T4):   No results found for: ASHLEYIR     CBC:   Lab Results   Component Value Date/Time    WBC 4.3 (L) 02/01/2021 09:48 AM    RBC 5.21 02/01/2021 09:48 AM    HEMOGLOBIN 15.6 02/01/2021 09:48 AM    HEMATOCRIT 46.8 02/01/2021 09:48 AM    MCV 89.8 02/01/2021 09:48 AM    MCH 29.9 02/01/2021 09:48 AM    MCHC 33.3 (L) 02/01/2021 09:48 AM    RDW 40.0 02/01/2021 09:48 AM    PLATELETCT 164 02/01/2021 09:48 AM    MPV 8.8 (L) 02/01/2021 09:48 AM    NEUTSPOLYS 58.90 01/16/2020 08:52 AM    LYMPHOCYTES 32.70 01/16/2020 08:52 AM    MONOCYTES 6.60 01/16/2020 08:52 AM    EOSINOPHILS 0.90 01/16/2020 08:52 AM    BASOPHILS 0.70 01/16/2020 08:52 AM    IMMGRAN 0.20 01/16/2020 08:52 AM    NRBC 0.00 01/16/2020 08:52 AM    NEUTS 2.67 01/16/2020 08:52 AM    LYMPHS 1.48 01/16/2020 08:52 AM    MONOS 0.30 01/16/2020 08:52 AM    EOS 0.04 01/16/2020 08:52 AM    BASO 0.03 01/16/2020 08:52 AM    IMMGRANAB 0.01 01/16/2020 08:52 AM    NRBCAB 0.00 01/16/2020 08:52 AM        CBC w/o DIFF  Lab Results   Component Value Date/Time    WBC 4.3 (L) 02/01/2021 09:48 AM    RBC 5.21 02/01/2021 09:48 AM    HEMOGLOBIN 15.6 02/01/2021 09:48 AM    MCV 89.8 02/01/2021 09:48 AM    MCH 29.9 02/01/2021 09:48 AM    MCHC 33.3 (L) 02/01/2021 09:48 AM    RDW 40.0 02/01/2021 09:48 AM    MPV 8.8 (L) 02/01/2021 09:48 AM       Prior echo/stress reviewed: Normal EF in 2019    EKG interpreted by me: Afib    Impression/Plan:  1. Paroxysmal atrial fibrillation (HCC)  EKG    EC-ECHOCARDIOGRAM COMPLETE W/O CONT     1. Persistent Afib  2. Amiodarone use, high risk medication management  3. OAC use, Eliquis management  4. HTN    - SBP at goal continue Valsartan  - Pt will come off amiodarone at this time, not effective any longer at restoring NSR  - Start metoprolol 25mg PO BID  - Continue  Eliquis  - We discussed sotalol load and DCCV vs catheter ablation. Pt undecided. Wants to give it some thought for a few weeks.  - Check updated echo now that he is persistent Afib  - We discussed pulmonary vein isolation for therapeutic management and continued rhythm control.  We discussed the risks and benefits of this procedure.  Risks include 1-3% risk of major cardiovascular event including stroke, myocardial infarction, phrenic nerve damage, esophageal injury and/or fistula formation, cardiac perforation, pericardial effusion, tamponade, major bleeding, or death.  I quoted a 70 to 80% chance free of atrial fibrillation at 12 months.  We discussed that he may also need a second procedure.    Pt will call to likely schedule either sotalol admission or AF ablation    Yobany Gray MD  Cardiac Electrophysiology

## 2021-12-31 ENCOUNTER — HOSPITAL ENCOUNTER (OUTPATIENT)
Dept: CARDIOLOGY | Facility: MEDICAL CENTER | Age: 71
End: 2021-12-31
Attending: INTERNAL MEDICINE
Payer: MEDICARE

## 2021-12-31 DIAGNOSIS — I48.0 PAROXYSMAL ATRIAL FIBRILLATION (HCC): ICD-10-CM

## 2021-12-31 PROCEDURE — 93306 TTE W/DOPPLER COMPLETE: CPT

## 2022-01-03 ENCOUNTER — TELEPHONE (OUTPATIENT)
Dept: MEDICAL GROUP | Facility: MEDICAL CENTER | Age: 72
End: 2022-01-03

## 2022-01-03 ENCOUNTER — HOSPITAL ENCOUNTER (OUTPATIENT)
Dept: LAB | Facility: MEDICAL CENTER | Age: 72
End: 2022-01-03
Attending: INTERNAL MEDICINE
Payer: MEDICARE

## 2022-01-03 ENCOUNTER — TELEPHONE (OUTPATIENT)
Dept: CARDIOLOGY | Facility: MEDICAL CENTER | Age: 72
End: 2022-01-03

## 2022-01-03 DIAGNOSIS — I10 ESSENTIAL HYPERTENSION: ICD-10-CM

## 2022-01-03 LAB
ALBUMIN SERPL BCP-MCNC: 4.6 G/DL (ref 3.2–4.9)
ALBUMIN/GLOB SERPL: 2.4 G/DL
ALP SERPL-CCNC: 71 U/L (ref 30–99)
ALT SERPL-CCNC: 31 U/L (ref 2–50)
ANION GAP SERPL CALC-SCNC: 9 MMOL/L (ref 7–16)
AST SERPL-CCNC: 22 U/L (ref 12–45)
BASOPHILS # BLD AUTO: 0.8 % (ref 0–1.8)
BASOPHILS # BLD: 0.04 K/UL (ref 0–0.12)
BILIRUB SERPL-MCNC: 0.9 MG/DL (ref 0.1–1.5)
BUN SERPL-MCNC: 13 MG/DL (ref 8–22)
CALCIUM SERPL-MCNC: 8.9 MG/DL (ref 8.5–10.5)
CHLORIDE SERPL-SCNC: 104 MMOL/L (ref 96–112)
CHOLEST SERPL-MCNC: 151 MG/DL (ref 100–199)
CO2 SERPL-SCNC: 27 MMOL/L (ref 20–33)
CREAT SERPL-MCNC: 1.06 MG/DL (ref 0.5–1.4)
EOSINOPHIL # BLD AUTO: 0.09 K/UL (ref 0–0.51)
EOSINOPHIL NFR BLD: 1.8 % (ref 0–6.9)
ERYTHROCYTE [DISTWIDTH] IN BLOOD BY AUTOMATED COUNT: 39.6 FL (ref 35.9–50)
FASTING STATUS PATIENT QL REPORTED: NORMAL
GLOBULIN SER CALC-MCNC: 1.9 G/DL (ref 1.9–3.5)
GLUCOSE SERPL-MCNC: 111 MG/DL (ref 65–99)
HCT VFR BLD AUTO: 46.8 % (ref 42–52)
HDLC SERPL-MCNC: 42 MG/DL
HGB BLD-MCNC: 15.7 G/DL (ref 14–18)
IMM GRANULOCYTES # BLD AUTO: 0.01 K/UL (ref 0–0.11)
IMM GRANULOCYTES NFR BLD AUTO: 0.2 % (ref 0–0.9)
LDLC SERPL CALC-MCNC: 85 MG/DL
LV EJECT FRACT  99904: 60
LV EJECT FRACT MOD 2C 99903: 58.61
LV EJECT FRACT MOD 4C 99902: 59.75
LV EJECT FRACT MOD BP 99901: 60.2
LYMPHOCYTES # BLD AUTO: 2.01 K/UL (ref 1–4.8)
LYMPHOCYTES NFR BLD: 39.5 % (ref 22–41)
MCH RBC QN AUTO: 29.9 PG (ref 27–33)
MCHC RBC AUTO-ENTMCNC: 33.5 G/DL (ref 33.7–35.3)
MCV RBC AUTO: 89.1 FL (ref 81.4–97.8)
MONOCYTES # BLD AUTO: 0.37 K/UL (ref 0–0.85)
MONOCYTES NFR BLD AUTO: 7.3 % (ref 0–13.4)
NEUTROPHILS # BLD AUTO: 2.57 K/UL (ref 1.82–7.42)
NEUTROPHILS NFR BLD: 50.4 % (ref 44–72)
NRBC # BLD AUTO: 0 K/UL
NRBC BLD-RTO: 0 /100 WBC
PLATELET # BLD AUTO: 188 K/UL (ref 164–446)
PMV BLD AUTO: 9.8 FL (ref 9–12.9)
POTASSIUM SERPL-SCNC: 4.6 MMOL/L (ref 3.6–5.5)
PROT SERPL-MCNC: 6.5 G/DL (ref 6–8.2)
RBC # BLD AUTO: 5.25 M/UL (ref 4.7–6.1)
SODIUM SERPL-SCNC: 140 MMOL/L (ref 135–145)
TRIGL SERPL-MCNC: 118 MG/DL (ref 0–149)
WBC # BLD AUTO: 5.1 K/UL (ref 4.8–10.8)

## 2022-01-03 PROCEDURE — 80061 LIPID PANEL: CPT

## 2022-01-03 PROCEDURE — 36415 COLL VENOUS BLD VENIPUNCTURE: CPT

## 2022-01-03 PROCEDURE — 93306 TTE W/DOPPLER COMPLETE: CPT | Mod: 26 | Performed by: INTERNAL MEDICINE

## 2022-01-03 PROCEDURE — 80053 COMPREHEN METABOLIC PANEL: CPT

## 2022-01-03 PROCEDURE — 85025 COMPLETE CBC W/AUTO DIFF WBC: CPT

## 2022-01-03 NOTE — TELEPHONE ENCOUNTER
Called and spoke to pt regarding results per PCP request. Verbalized understanding. No further questions at this time.       ----- Message from Erin Madden M.D. sent at 1/3/2022  1:03 PM PST -----  CBC is normal except for mildly decreased MCHC.  Lipid panel within normal limits.  Kidney, liver function, electrolytes are all normal.Mild elevation of fasting blood glucose.

## 2022-01-06 ENCOUNTER — OFFICE VISIT (OUTPATIENT)
Dept: MEDICAL GROUP | Facility: MEDICAL CENTER | Age: 72
End: 2022-01-06
Payer: MEDICARE

## 2022-01-06 VITALS
BODY MASS INDEX: 27.59 KG/M2 | SYSTOLIC BLOOD PRESSURE: 108 MMHG | WEIGHT: 233.69 LBS | HEART RATE: 75 BPM | OXYGEN SATURATION: 96 % | TEMPERATURE: 97.2 F | HEIGHT: 77 IN | RESPIRATION RATE: 18 BRPM | DIASTOLIC BLOOD PRESSURE: 64 MMHG

## 2022-01-06 DIAGNOSIS — R73.01 IMPAIRED FASTING GLUCOSE: ICD-10-CM

## 2022-01-06 DIAGNOSIS — I48.0 PAROXYSMAL ATRIAL FIBRILLATION (HCC): ICD-10-CM

## 2022-01-06 DIAGNOSIS — I10 ESSENTIAL HYPERTENSION: ICD-10-CM

## 2022-01-06 DIAGNOSIS — Z80.42 FAMILY HISTORY OF MALIGNANT NEOPLASM OF PROSTATE: ICD-10-CM

## 2022-01-06 DIAGNOSIS — Z85.828 HISTORY OF BASAL CELL CARCINOMA (BCC) OF SKIN: ICD-10-CM

## 2022-01-06 DIAGNOSIS — Z00.00 MEDICARE ANNUAL WELLNESS VISIT, SUBSEQUENT: Primary | ICD-10-CM

## 2022-01-06 DIAGNOSIS — Z23 NEED FOR VACCINATION: ICD-10-CM

## 2022-01-06 DIAGNOSIS — G20.C PRIMARY PARKINSONISM (HCC): ICD-10-CM

## 2022-01-06 DIAGNOSIS — K21.9 GASTROESOPHAGEAL REFLUX DISEASE WITHOUT ESOPHAGITIS: ICD-10-CM

## 2022-01-06 DIAGNOSIS — D68.69 SECONDARY HYPERCOAGULABLE STATE (HCC): ICD-10-CM

## 2022-01-06 PROCEDURE — G0439 PPPS, SUBSEQ VISIT: HCPCS | Performed by: INTERNAL MEDICINE

## 2022-01-06 PROCEDURE — 90715 TDAP VACCINE 7 YRS/> IM: CPT | Performed by: INTERNAL MEDICINE

## 2022-01-06 PROCEDURE — 90471 IMMUNIZATION ADMIN: CPT | Performed by: INTERNAL MEDICINE

## 2022-01-06 ASSESSMENT — ACTIVITIES OF DAILY LIVING (ADL): BATHING_REQUIRES_ASSISTANCE: 0

## 2022-01-06 ASSESSMENT — PATIENT HEALTH QUESTIONNAIRE - PHQ9: CLINICAL INTERPRETATION OF PHQ2 SCORE: 0

## 2022-01-06 ASSESSMENT — ENCOUNTER SYMPTOMS: GENERAL WELL-BEING: FAIR

## 2022-01-06 ASSESSMENT — FIBROSIS 4 INDEX: FIB4 SCORE: 1.45

## 2022-01-06 NOTE — ASSESSMENT & PLAN NOTE
BP is well controlled on amlodipine 10 mg, metoprolol 25 mg twice a day and valsartan 80 mg daily. Patient reports no angina or strokelike symptoms.  BP at office today 108/64-patient states that he has not had much water or food since morning and he was running late.  Encourage 6 to 8 glasses of water per day.

## 2022-01-06 NOTE — ASSESSMENT & PLAN NOTE
This is a chronic condition, well controlled and managed by neurology.  Currently patient is taking Sinemet  mg tablet and rasagiline 1 mg tablet daily.

## 2022-01-06 NOTE — PROGRESS NOTES
Chief Complaint   Patient presents with   • Annual Wellness Visit         HPI: Gopal is a 71 y.o. here for Medicare Annual Wellness Visit:     He has history of paroxysmal atrial fibrillation and fortunately he has been in A. fib for almost 10 months now.  He had EKG done that showed atrial fibrillation, he had repeat echocardiogram that showed normal systolic function and no changes from prior echocardiogram.  He has been evaluated by his cardiologist and they have discussed sotalol load and DCCV versus catheter ablation.  Patient wanted to think about it for a few weeks.   His hypertension is well controlled and he reports good home blood pressure reading in 120 systolic.  Today blood pressure is on the lower side and patient states that he did not have time to eat or drink enough water today.  He has no angina or strokelike symptoms.  He does have history of GERD and he was evaluated with EGD in 2019, currently he is only using omeprazole as needed for symptoms of heartburn.  He also has history of basal cell carcinoma of the skin and he is seeing dermatologist every 6 months.  He reports no new suspicious lesions.  History of Parkinson's disease and he is taking Sinemet and rasagiline.     Patient Active Problem List    Diagnosis Date Noted   • Secondary hypercoagulable state (HCC) 01/06/2022   • Effusion of left elbow 10/20/2021   • Long term (current) use of anticoagulants 08/05/2021   • Primary parkinsonism (HCC) 04/08/2020   • Gastroesophageal reflux disease without esophagitis 01/21/2020   • Family history of prostate cancer 04/22/2019   • Impaired fasting glucose 03/11/2019   • Paroxysmal atrial fibrillation (HCC) 02/11/2019   • History of basal cell carcinoma (BCC) of skin 02/11/2019   • History of varicocele 02/11/2019   • Essential hypertension 01/15/2019     Current Outpatient Medications   Medication Sig Dispense Refill   • metoprolol tartrate (LOPRESSOR) 25 MG Tab Take 1 Tablet by mouth 2 times a  day. 60 Tablet 11   • amLODIPine (NORVASC) 10 MG Tab Take 20 mg by mouth every day.     • valsartan (DIOVAN) 80 MG Tab      • apixaban (ELIQUIS) 5mg Tab TAKE 1 TABLET BY MOUTH TWICE A  Tablet 3   • Cyanocobalamin (B-12 PO) Take  by mouth.     • Zinc Oxide-Vitamin C (ZINC PLUS VITAMIN C PO) Take  by mouth.     • carbidopa-levodopa (SINEMET)  MG Tab TAKE 1 TABLET BY MOUTH TWICE A  Tablet 3   • rasagiline (AZILECT) 1 MG Tab Take 1 Tablet by mouth every day. 90 Tablet 3   • omeprazole (PRILOSEC) 20 MG delayed-release capsule TAKE 1 CAPSULE BY MOUTH EVERY DAY 90 capsule 2   • triamcinolone acetonide (KENALOG) 0.1 % Cream AAA abdomen BID PRN rash.  Avoid use on sensitive sites.  If use on face, nose; limit to sparing amount 2X/week 60 g 1   • fluticasone (FLONASE) 50 MCG/ACT nasal spray Spray 1 Spray in nose 1 time daily as needed (ALLERGY).       No current facility-administered medications for this visit.      Patient is taking medications as noted in medication list.  Current supplements as per medication list.     Allergies: Molds & smuts    Current social contact/activities: jcarlos betancourt    Is patient current with immunizations? Yes.    He  reports that he has never smoked. He has never used smokeless tobacco. He reports current alcohol use of about 1.2 oz of alcohol per week. He reports that he does not use drugs.  Counseling given: Not Answered    DPA/Advanced directive: Patient does not have an Advanced Directive.  A packet and workshop information was given on Advanced Directives.    ROS:    Gait: Uses no assistive device   Ostomy: No   Other tubes: No   Amputations: No   Chronic oxygen use No   Last eye exam: scheduled for febr 1 st 2022  Wears hearing aids: No   : Reports urinary leakage during the last 6 months that has not interfered at all with their daily activities or sleep.    Screening:    Depression Screening    Little interest or pleasure in doing things?  0 - not at all  Feeling  down, depressed, or hopeless? 0 - not at all  Patient Health Questionnaire Score: 0    If depressive symptoms identified deferred to follow up visit unless specifically addressed in assessment and plan.    Interpretation of PHQ-9 Total Score   Score Severity   1-4 No Depression   5-9 Mild Depression   10-14 Moderate Depression   15-19 Moderately Severe Depression   20-27 Severe Depression    Screening for Cognitive Impairment    Three Minute Recall (captain, garden, picture)  3/3    Jimbo clock face with all 12 numbers and set the hands to show 5 past 8.  Yes    If cognitive concerns identified, deferred for follow up unless specifically addressed in assessment and plan.    Fall Risk Assessment    Has the patient had two or more falls in the last year or any fall with injury in the last year?  No  If fall risk identified, deferred for follow up unless specifically addressed in assessment and plan.    Safety Assessment    Throw rugs on floor.  Yes  Handrails on all stairs.  Yes  Good lighting in all hallways.  Yes  Difficulty hearing.  No  Patient counseled about all safety risks that were identified.    Functional Assessment ADLs    Are there any barriers preventing you from cooking for yourself or meeting nutritional needs?  No.    Are there any barriers preventing you from driving safely or obtaining transportation?  No.    Are there any barriers preventing you from using a telephone or calling for help?  No.    Are there any barriers preventing you from shopping?  No.    Are there any barriers preventing you from taking care of your own finances?  No.    Are there any barriers preventing you from managing your medications?  No.    Are there any barriers preventing you from showering, bathing or dressing yourself?  No.    Are you currently engaging in any exercise or physical activity?  Yes.     What is your perception of your health?  Fair.    Health Maintenance Summary          Overdue - IMM ZOSTER VACCINES (1  of 2) Overdue - never done    No completion history exists for this topic.          Annual Wellness Visit (Every 366 Days) Next due on 1/7/2023 01/06/2022  Visit Dx: Medicare annual wellness visit, subsequent    01/06/2022  Level of Service: ANNUAL WELLNESS VISIT-INCLUDES PPPS SUBSEQUE*    12/30/2019  Initial Annual Wellness Visit - Includes PPPS ()          COLORECTAL CANCER SCREENING (COLONOSCOPY - Every 5 Years) Tentatively due on 8/8/2024 08/08/2019  REFERRAL TO GI FOR COLONOSCOPY    01/01/2016  COLONOSCOPY (Done - pt states done 2016 due 5 years)          IMM DTaP/Tdap/Td Vaccine (4 - Td or Tdap) Next due on 1/6/2032 01/06/2022  Imm Admin: Tdap Vaccine    03/31/2010  Imm Admin: Tdap Vaccine    01/01/2010  Imm Admin: Tdap Vaccine          IMM HEP B VACCINE (Series Information) Aged Out    06/16/2015  Imm Admin: Hepatitis B Vaccine (Adol/Adult)    05/19/2015  Imm Admin: Hepatitis B Vaccine (Adol/Adult)          IMM PNEUMOCOCCAL VACCINE: 65+ Years (Series Information) Completed    04/22/2019  Imm Admin: Pneumococcal polysaccharide vaccine (PPSV-23)    12/12/2017  Imm Admin: Pneumococcal Conjugate Vaccine (Prevnar/PCV-13)          HEPATITIS C SCREENING  Completed    01/16/2020  HEP C VIRUS ANTIBODY          IMM INFLUENZA (Series Information) Completed    10/20/2021  Imm Admin: Influenza Vaccine Adult HD    11/12/2020  Imm Admin: Influenza Vaccine Adult HD    09/30/2019  Imm Admin: Influenza Vaccine Adult HD    10/12/2018  Imm Admin: Influenza Vaccine Adult HD    10/01/2018  Imm Admin: Influenza Vaccine Adult HD    Only the first 5 history entries have been loaded, but more history exists.          COVID-19 Vaccine (Series Information) Completed    11/12/2021  Imm Admin: Pfizer SARS-CoV-2 Vaccine 12+    03/23/2021  Imm Admin: Pfizer SARS-CoV-2 Vaccine    03/02/2021  Imm Admin: Pfizer SARS-CoV-2 Vaccine          IMM MENINGOCOCCAL VACCINE (MCV4) (Series Information) Aged Out    No completion history  "exists for this topic.                Patient Care Team:  Erin Madden M.D. as PCP - General (Internal Medicine)  [facility]  Yobany Gray M.D. as Consulting Physician (Internal Medicine Clinical Cardiac Electrophysiology)  Aniket Waterman M.D. as Consulting Physician (Neurology)    Social History     Tobacco Use   • Smoking status: Never Smoker   • Smokeless tobacco: Never Used   Vaping Use   • Vaping Use: Never used   Substance Use Topics   • Alcohol use: Yes     Alcohol/week: 1.2 oz     Types: 2 Glasses of wine per week     Comment: 2 glasses wine/night   • Drug use: No     Family History   Problem Relation Age of Onset   • Cancer Mother         bladder non-smoker   • Dementia Father    • Cancer Brother         prostate age 52     He  has a past medical history of Arrhythmia, Cancer (HCC), and Hypertension.   Past Surgical History:   Procedure Laterality Date   • OTHER      mohs to nose, back     Exam:     /64 (BP Location: Right arm, Patient Position: Sitting, BP Cuff Size: Adult)   Pulse 75   Temp 36.2 °C (97.2 °F) (Temporal)   Resp 18   Ht 1.956 m (6' 5\")   Wt 106 kg (233 lb 11 oz)   SpO2 96%  Body mass index is 27.71 kg/m².    Hearing good.    Dentition fair  Alert, oriented in no acute distress.  Eye contact is good, speech goal directed, affect calm    Assessment and Plan. The following treatment and monitoring plan is recommended:    Problem List Items Addressed This Visit     Essential hypertension     BP is well controlled on amlodipine 10 mg, metoprolol 25 mg twice a day and valsartan 80 mg daily. Patient reports no angina or strokelike symptoms.  BP at office today 108/64-patient states that he has not had much water or food since morning and he was running late.  Encourage 6 to 8 glasses of water per day.           Family history of prostate cancer     Patient is asymptomatic, most recent PSA 1.14.         Gastroesophageal reflux disease without " esophagitis     Patient reports that he is taking omeprazole 20 mg delayed release as needed and his symptoms are well controlled.  Continue current regimen.         History of basal cell carcinoma (BCC) of skin     Follows with dermatology every 6 months, no new lesions reported.         Impaired fasting glucose     Lab Results   Component Value Date/Time    HBA1C 5.5 02/01/2021 09:48 AM   Reports regular exercise and diet and eating as healthy as he can.           Paroxysmal atrial fibrillation (HCC)     Patient has been in A. fib for almost 2 months now, he was evaluated by cardiology and they have offered treatment options.  Echocardiogram showed no interval change from the previous.  He will contact his cardiology to make a decision about next steps.         Primary parkinsonism (HCC)     This is a chronic condition, well controlled and managed by neurology.  Currently patient is taking Sinemet  mg tablet and rasagiline 1 mg tablet daily.          Secondary hypercoagulable state (HCC)     Due to atrial fibrillation.  Currently anticoagulated with Eliquis 5 mg twice a day.           Other Visit Diagnoses     Medicare annual wellness visit, subsequent    -  Primary    Need for vaccination        Relevant Orders    Tdap =>8yo IM (Completed)        Services suggested: No services needed at this time  Health Care Screening recommendations as per orders if indicated.  Referrals offered: PT/OT/Nutrition counseling/Behavioral Health/Smoking cessation as per orders if indicated.    Discussion today about general wellness and lifestyle habits:    · Prevent falls and reduce trip hazards; Cautioned about securing or removing rugs.  · Have a working fire alarm and carbon monoxide detector;   · Engage in regular physical activity and social activities       Follow-up: Return in about 6 months (around 7/6/2022) for 6 mo follow up.     Please note that this dictation was created using voice recognition software. I have  made every reasonable attempt to correct obvious errors, but I expect that there are errors of grammar and possibly content that I did not discover before finalizing the note.

## 2022-01-06 NOTE — ASSESSMENT & PLAN NOTE
Lab Results   Component Value Date/Time    HBA1C 5.5 02/01/2021 09:48 AM   Reports regular exercise and diet and eating as healthy as he can.

## 2022-01-06 NOTE — ASSESSMENT & PLAN NOTE
Patient reports that he is taking omeprazole 20 mg delayed release as needed and his symptoms are well controlled.  Continue current regimen.

## 2022-01-06 NOTE — ASSESSMENT & PLAN NOTE
Patient has been in A. fib for almost 2 months now, he was evaluated by cardiology and they have offered treatment options.  Echocardiogram showed no interval change from the previous.  He will contact his cardiology to make a decision about next steps.

## 2022-01-12 ENCOUNTER — PATIENT MESSAGE (OUTPATIENT)
Dept: CARDIOLOGY | Facility: MEDICAL CENTER | Age: 72
End: 2022-01-12

## 2022-01-12 DIAGNOSIS — G20.C PRIMARY PARKINSONISM (HCC): ICD-10-CM

## 2022-01-12 RX ORDER — RASAGILINE 1 MG/1
1 TABLET ORAL
Qty: 90 TABLET | Refills: 3 | Status: CANCELLED | OUTPATIENT
Start: 2022-01-12

## 2022-01-13 NOTE — PATIENT COMMUNICATION
S/w pt and reviewed Dr. Gray's interpretation of echo as well as notes from last office visit. Discussed that Dr. Gray was waiting for him to decide if he wants to proceed with sotalol admission or ablation. Pt thought that the echo results might help determine the best option. Offered to send Dr. Gray a message. However, pt requests to see Dr. Gray again in office. appt made.

## 2022-01-17 PROCEDURE — 93268 ECG RECORD/REVIEW: CPT | Performed by: INTERNAL MEDICINE

## 2022-01-18 ENCOUNTER — OFFICE VISIT (OUTPATIENT)
Dept: CARDIOLOGY | Facility: MEDICAL CENTER | Age: 72
End: 2022-01-18
Payer: MEDICARE

## 2022-01-18 VITALS
DIASTOLIC BLOOD PRESSURE: 84 MMHG | WEIGHT: 232 LBS | SYSTOLIC BLOOD PRESSURE: 118 MMHG | HEART RATE: 75 BPM | OXYGEN SATURATION: 98 % | HEIGHT: 77 IN | BODY MASS INDEX: 27.39 KG/M2 | RESPIRATION RATE: 12 BRPM

## 2022-01-18 DIAGNOSIS — I48.0 PAROXYSMAL ATRIAL FIBRILLATION (HCC): ICD-10-CM

## 2022-01-18 LAB — EKG IMPRESSION: NORMAL

## 2022-01-18 PROCEDURE — 93000 ELECTROCARDIOGRAM COMPLETE: CPT | Performed by: INTERNAL MEDICINE

## 2022-01-18 PROCEDURE — 99215 OFFICE O/P EST HI 40 MIN: CPT | Performed by: INTERNAL MEDICINE

## 2022-01-18 ASSESSMENT — FIBROSIS 4 INDEX: FIB4 SCORE: 1.45

## 2022-01-18 NOTE — PROGRESS NOTES
Arrhythmia Clinic Note (Established patient)    DOS: 1/18/2022    Chief complaint/Reason for consult: Afib    Interval History: 70 y/o M with pAF now persistent on Eliquis. No recent change in symptoms.    ROS (+ highlighted in bold):  Constitutional: Fevers/chills/fatigue/weightloss  HEENT: Blurry vision/eye pain/sore throat/hearing loss  Respiratory: Shortness of breath/cough  Cardiovascular: Chest pain/palpitations/edema/orthopnea/syncope  GI: Nausea/vomitting/diarrhea  MSK: Arthralgias/myagias/muscle weakness  Skin: Rash/sores  Neurological: Numbness/tremors/vertigo  Endocrine: Excessive thirst/polyuria/cold intolerance/heat intolerance  Psych: Depression/anxiety    Past Medical History:   Diagnosis Date   • Arrhythmia     Afib   • Cancer (HCC)     Basal cell/ Squamous skin   • Hypertension        Past Surgical History:   Procedure Laterality Date   • OTHER      mohs to nose, back       Social History     Socioeconomic History   • Marital status:      Spouse name: Not on file   • Number of children: Not on file   • Years of education: Not on file   • Highest education level: Bachelor's degree (e.g., BA, AB, BS)   Occupational History   • Not on file   Tobacco Use   • Smoking status: Never Smoker   • Smokeless tobacco: Never Used   Vaping Use   • Vaping Use: Never used   Substance and Sexual Activity   • Alcohol use: Yes     Alcohol/week: 1.2 oz     Types: 2 Glasses of wine per week     Comment: 2 glasses wine/night   • Drug use: No   • Sexual activity: Yes     Partners: Female   Other Topics Concern   • Not on file   Social History Narrative   • Not on file     Social Determinants of Health     Financial Resource Strain: Low Risk    • Difficulty of Paying Living Expenses: Not hard at all   Food Insecurity: No Food Insecurity   • Worried About Running Out of Food in the Last Year: Never true   • Ran Out of Food in the Last Year: Never true   Transportation Needs: No Transportation Needs   • Lack of  Transportation (Medical): No   • Lack of Transportation (Non-Medical): No   Physical Activity: Sufficiently Active   • Days of Exercise per Week: 4 days   • Minutes of Exercise per Session: 50 min   Stress: No Stress Concern Present   • Feeling of Stress : Only a little   Social Connections: Moderately Integrated   • Frequency of Communication with Friends and Family: More than three times a week   • Frequency of Social Gatherings with Friends and Family: More than three times a week   • Attends Anabaptism Services: Never   • Active Member of Clubs or Organizations: Yes   • Attends Club or Organization Meetings: More than 4 times per year   • Marital Status:    Intimate Partner Violence:    • Fear of Current or Ex-Partner: Not on file   • Emotionally Abused: Not on file   • Physically Abused: Not on file   • Sexually Abused: Not on file   Housing Stability: Low Risk    • Unable to Pay for Housing in the Last Year: No   • Number of Places Lived in the Last Year: 1   • Unstable Housing in the Last Year: No       Family History   Problem Relation Age of Onset   • Cancer Mother         bladder non-smoker   • Dementia Father    • Cancer Brother         prostate age 52       Allergies   Allergen Reactions   • Molds & Smuts Unspecified     Sneezing       Current Outpatient Medications   Medication Sig Dispense Refill   • metoprolol tartrate (LOPRESSOR) 25 MG Tab TAKE 1 TABLET BY MOUTH TWICE A DAY 60 Tablet 11   • valsartan (DIOVAN) 80 MG Tab Take 80 mg by mouth every day.     • apixaban (ELIQUIS) 5mg Tab TAKE 1 TABLET BY MOUTH TWICE A  Tablet 3   • Cyanocobalamin (B-12 PO) Take  by mouth.     • Zinc Oxide-Vitamin C (ZINC PLUS VITAMIN C PO) Take  by mouth.     • carbidopa-levodopa (SINEMET)  MG Tab TAKE 1 TABLET BY MOUTH TWICE A  Tablet 3   • rasagiline (AZILECT) 1 MG Tab Take 1 Tablet by mouth every day. 90 Tablet 3   • omeprazole (PRILOSEC) 20 MG delayed-release capsule TAKE 1 CAPSULE BY MOUTH  "EVERY DAY 90 capsule 2   • triamcinolone acetonide (KENALOG) 0.1 % Cream AAA abdomen BID PRN rash.  Avoid use on sensitive sites.  If use on face, nose; limit to sparing amount 2X/week 60 g 1   • fluticasone (FLONASE) 50 MCG/ACT nasal spray Spray 1 Spray in nose 1 time daily as needed (ALLERGY).     • amLODIPine (NORVASC) 10 MG Tab Take 20 mg by mouth every day. (Patient not taking: Reported on 1/18/2022)       No current facility-administered medications for this visit.       Physical Exam:  Vitals:    01/18/22 1356   BP: 118/84   BP Location: Left arm   Patient Position: Sitting   BP Cuff Size: Adult   Pulse: 75   Resp: 12   SpO2: 98%   Weight: 105 kg (232 lb)   Height: 1.956 m (6' 5\")     General appearance: NAD, conversant   Eyes: anicteric sclerae, moist conjunctivae; no lid-lag; PERRLA  HENT: Atraumatic; oropharynx clear with moist mucous membranes and no mucosal ulcerations; normal hard and soft palate  Neck: Trachea midline; FROM, supple, no thyromegaly or lymphadenopathy  Lungs: CTA, with normal respiratory effort and no intercostal retractions  CV: RRR, no MRGs, no JVD  Abdomen: Soft, non-tender; no masses or HSM  Extremities: No peripheral edema or extremity lymphadenopathy  Skin: Normal temperature, turgor and texture; no rash, ulcers or subcutaneous nodules  Psych: Appropriate affect, alert and oriented to person, place and time    Data:  Lipids:   Lab Results   Component Value Date/Time    CHOLSTRLTOT 151 01/03/2022 07:53 AM    TRIGLYCERIDE 118 01/03/2022 07:53 AM    HDL 42 01/03/2022 07:53 AM    LDL 85 01/03/2022 07:53 AM        BMP:  Lab Results   Component Value Date/Time    SODIUM 140 01/03/2022 0753    POTASSIUM 4.6 01/03/2022 0753    CHLORIDE 104 01/03/2022 0753    CO2 27 01/03/2022 0753    GLUCOSE 111 (H) 01/03/2022 0753    BUN 13 01/03/2022 0753    CREATININE 1.06 01/03/2022 0753    CALCIUM 8.9 01/03/2022 0753    ANION 9.0 01/03/2022 0753        TSH:   Lab Results   Component Value Date/Time    " TSHULTRASEN 3.900 10/02/2019 0800        THYROXINE (T4):   No results found for: ASHLEYIR     CBC:   Lab Results   Component Value Date/Time    WBC 5.1 01/03/2022 07:53 AM    RBC 5.25 01/03/2022 07:53 AM    HEMOGLOBIN 15.7 01/03/2022 07:53 AM    HEMATOCRIT 46.8 01/03/2022 07:53 AM    MCV 89.1 01/03/2022 07:53 AM    MCH 29.9 01/03/2022 07:53 AM    MCHC 33.5 (L) 01/03/2022 07:53 AM    RDW 39.6 01/03/2022 07:53 AM    PLATELETCT 188 01/03/2022 07:53 AM    MPV 9.8 01/03/2022 07:53 AM    NEUTSPOLYS 50.40 01/03/2022 07:53 AM    LYMPHOCYTES 39.50 01/03/2022 07:53 AM    MONOCYTES 7.30 01/03/2022 07:53 AM    EOSINOPHILS 1.80 01/03/2022 07:53 AM    BASOPHILS 0.80 01/03/2022 07:53 AM    IMMGRAN 0.20 01/03/2022 07:53 AM    NRBC 0.00 01/03/2022 07:53 AM    NEUTS 2.57 01/03/2022 07:53 AM    LYMPHS 2.01 01/03/2022 07:53 AM    MONOS 0.37 01/03/2022 07:53 AM    EOS 0.09 01/03/2022 07:53 AM    BASO 0.04 01/03/2022 07:53 AM    IMMGRANAB 0.01 01/03/2022 07:53 AM    NRBCAB 0.00 01/03/2022 07:53 AM        CBC w/o DIFF  Lab Results   Component Value Date/Time    WBC 5.1 01/03/2022 07:53 AM    RBC 5.25 01/03/2022 07:53 AM    HEMOGLOBIN 15.7 01/03/2022 07:53 AM    MCV 89.1 01/03/2022 07:53 AM    MCH 29.9 01/03/2022 07:53 AM    MCHC 33.5 (L) 01/03/2022 07:53 AM    RDW 39.6 01/03/2022 07:53 AM    MPV 9.8 01/03/2022 07:53 AM       Prior echo/stress reviewed: Normal EF    EKG interpreted by me: Afib    Impression/Plan:  1. Persistent Afib  2. OAC management, Eliquis    - Discussed AAD loading and DCCV vs AF ablation  - We discussed pulmonary vein isolation for therapeutic management and continued rhythm control.  We discussed the risks and benefits of this procedure.  Risks include 1-3% risk of major cardiovascular event including stroke, myocardial infarction, phrenic nerve damage, esophageal injury and/or fistula formation, cardiac perforation, pericardial effusion, tamponade, major bleeding, or death.  I quoted a 60-70% chance free of atrial  fibrillation at 12 months.  We discussed that he may also need a second procedure.  - Ultimately he is leaning towards ablation, he will contact us to schedule  - Continue Erma Gray MD  Cardiac Electrophysiology

## 2022-01-24 ENCOUNTER — PATIENT MESSAGE (OUTPATIENT)
Dept: CARDIOLOGY | Facility: MEDICAL CENTER | Age: 72
End: 2022-01-24
Payer: MEDICARE

## 2022-01-25 ENCOUNTER — TELEPHONE (OUTPATIENT)
Dept: CARDIOLOGY | Facility: MEDICAL CENTER | Age: 72
End: 2022-01-25

## 2022-01-25 DIAGNOSIS — I48.0 PAROXYSMAL ATRIAL FIBRILLATION (HCC): ICD-10-CM

## 2022-01-25 NOTE — TELEPHONE ENCOUNTER
Yobany Gray M.D.  You; Ryanne Joseph R.N. 30 minutes ago (10:20 AM)            Lets schedule Afib ablation, no meds to hold, thanks

## 2022-01-27 ENCOUNTER — OFFICE VISIT (OUTPATIENT)
Dept: DERMATOLOGY | Facility: IMAGING CENTER | Age: 72
End: 2022-01-27
Payer: MEDICARE

## 2022-01-27 DIAGNOSIS — D49.2 NEOPLASM OF SKIN: ICD-10-CM

## 2022-01-27 DIAGNOSIS — L81.4 LENTIGO: ICD-10-CM

## 2022-01-27 DIAGNOSIS — L30.4 INTERTRIGO: ICD-10-CM

## 2022-01-27 DIAGNOSIS — L82.1 SEBORRHEIC KERATOSIS: ICD-10-CM

## 2022-01-27 DIAGNOSIS — Z85.828 HX OF MALIGNANT NEOPLASM OF SKIN: ICD-10-CM

## 2022-01-27 DIAGNOSIS — Z12.83 SKIN CANCER SCREENING: ICD-10-CM

## 2022-01-27 DIAGNOSIS — L90.8 SKIN AGING: ICD-10-CM

## 2022-01-27 DIAGNOSIS — L21.9 SEBORRHEA: ICD-10-CM

## 2022-01-27 DIAGNOSIS — L57.0 ACTINIC KERATOSIS: ICD-10-CM

## 2022-01-27 DIAGNOSIS — D22.9 NEVUS: ICD-10-CM

## 2022-01-27 PROCEDURE — 17003 DESTRUCT PREMALG LES 2-14: CPT | Performed by: DERMATOLOGY

## 2022-01-27 PROCEDURE — 99213 OFFICE O/P EST LOW 20 MIN: CPT | Mod: 25 | Performed by: DERMATOLOGY

## 2022-01-27 PROCEDURE — 11102 TANGNTL BX SKIN SINGLE LES: CPT | Performed by: DERMATOLOGY

## 2022-01-27 PROCEDURE — 17000 DESTRUCT PREMALG LESION: CPT | Mod: 59 | Performed by: DERMATOLOGY

## 2022-01-27 RX ORDER — NYSTATIN 100000 [USP'U]/G
1 POWDER TOPICAL 3 TIMES DAILY
Qty: 15 G | Refills: 2 | Status: SHIPPED | OUTPATIENT
Start: 2022-01-27 | End: 2022-06-20

## 2022-01-27 RX ORDER — FLUOROURACIL 50 MG/G
CREAM TOPICAL
Qty: 40 G | Refills: 0 | Status: SHIPPED | OUTPATIENT
Start: 2022-01-27 | End: 2022-09-28

## 2022-01-27 NOTE — PROGRESS NOTES
"CC: Follow up CLEVE       Subjective: Previously seen patient here for CLEVE.      HPI/location: LT neck, crusted spot  Time present: 6 mos  Painful lesion: Yes  Itching lesion: No  Enlarging lesion: No  Anything make it better or worse? No    HPI/location: RT temple, scabbing and rough spots  Time present: 10 years  Painful lesion: Yes  Itching lesion: No  Enlarging lesion: No  Anything make it better or worse? No    HPI/location: Left nasal sidewall, rough crusty spots  Time present: 1 year  Painful lesion: No  Itching lesion: No  Enlarging lesion: No  Anything make it better or worse? No    Left groin - hx of staph and jock itch. Desires eval.     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 No itch.  No cough.  DermPMH: mult past NMSC  No problem-specific Assessment & Plan notes found for this encounter.    Relevant PMH: mult med comorbidities  Social: never smoker    PE: Gen:WDWN male in NAD. Skin: Scalp/face/eyes/lips/neck/chest/back/arms/legs/hands/feet/buttocks - examined.  Genitals exam declined  -scaling, lite of scalp  -thin HK papules on left neck and left antehelix. Also gritty, scattered on right temple and nose, cheeks.   -scattered hyperpigmented macules/papules, appearing benign on torso and extremities  -pearly papule + telangiectasias on upper back  -xerosis, diffusely on lower legs, feet, stucco keratoses visible  -right inguinal skin, erythema and little maceration      A/P: AKs: left ear and left neck  -counseled on diagnosis and treatment, including risks/benefits/alternatives of cyrotherapy  -LN2 " 25 sec X 2 cycles X 2lesions  -f/u if persists at 1 month    AKs: face  -efudex cream BID X 2-4 weeks, se reviewed  -if not able to get efudex. Candidate for PDT    Neoplasm NOS: back, eval bcc  -consent for bx, including R/B/A. Cleaned with EtOH, anesthesia with lidocaine 1% + epinephrine, shave bx, AlCl3 for hemostasis  -vaseline/bandage and wound care reviewed    Intertrigo:   -Reviewed dx/tx  -nystatin powder TID X 5-7 days PRN      Hx of skin cancer: most recently SCCIS - free margins, right arm, observing. F/u future visit.  Has declined ED&C, excision  -cont'd sunprotection and skin cancer surveillance  -Q 6mo-annual exam recommended; f/u suspicious lesions PRN  -rec nicotinamide OTC 500mg PO BID    Nevi: benign appearing:  -Reviewed skin cancer detection/prevention  -RTC PRN growth/changes/concerning features    Lentigos/SKs: benign  -reassurance  -reviewed skin cancer detection/prevention    Seborrhea: scalp:  -reviewed dx/tx  -reviewed OTC shampoos    Xerosis, diffuse:  -counseled re: dx/tx  -OTC moisturizers recommended      I have reviewed medications relevant to my specialty.

## 2022-01-27 NOTE — TELEPHONE ENCOUNTER
Recvd voice message from patient - called and left another message on patient's voicemail requesting a call back to schedule this procedure.

## 2022-01-27 NOTE — TELEPHONE ENCOUNTER
Patient scheduled for afib ablation w/RAMON on 3-8-22 with Dr. Gray. Patient has been instructed to check in at 8:00 for 10:00 case time. Message sent to authorizations. Emailed Carto.

## 2022-02-04 ENCOUNTER — TELEPHONE (OUTPATIENT)
Dept: DERMATOLOGY | Facility: IMAGING CENTER | Age: 72
End: 2022-02-04

## 2022-03-04 ENCOUNTER — PRE-ADMISSION TESTING (OUTPATIENT)
Dept: ADMISSIONS | Facility: MEDICAL CENTER | Age: 72
End: 2022-03-04
Attending: INTERNAL MEDICINE
Payer: MEDICARE

## 2022-03-04 DIAGNOSIS — Z01.812 PRE-OPERATIVE LABORATORY EXAMINATION: ICD-10-CM

## 2022-03-04 DIAGNOSIS — Z01.810 PRE-OPERATIVE CARDIOVASCULAR EXAMINATION: ICD-10-CM

## 2022-03-04 LAB
ALBUMIN SERPL BCP-MCNC: 4.3 G/DL (ref 3.2–4.9)
ALBUMIN/GLOB SERPL: 1.8 G/DL
ALP SERPL-CCNC: 68 U/L (ref 30–99)
ALT SERPL-CCNC: 22 U/L (ref 2–50)
ANION GAP SERPL CALC-SCNC: 8 MMOL/L (ref 7–16)
AST SERPL-CCNC: 13 U/L (ref 12–45)
BASOPHILS # BLD AUTO: 0.5 % (ref 0–1.8)
BASOPHILS # BLD: 0.03 K/UL (ref 0–0.12)
BILIRUB SERPL-MCNC: 0.6 MG/DL (ref 0.1–1.5)
BUN SERPL-MCNC: 17 MG/DL (ref 8–22)
CALCIUM SERPL-MCNC: 9.2 MG/DL (ref 8.5–10.5)
CHLORIDE SERPL-SCNC: 103 MMOL/L (ref 96–112)
CO2 SERPL-SCNC: 29 MMOL/L (ref 20–33)
CREAT SERPL-MCNC: 1.04 MG/DL (ref 0.5–1.4)
EKG IMPRESSION: NORMAL
EOSINOPHIL # BLD AUTO: 0.05 K/UL (ref 0–0.51)
EOSINOPHIL NFR BLD: 0.9 % (ref 0–6.9)
ERYTHROCYTE [DISTWIDTH] IN BLOOD BY AUTOMATED COUNT: 40 FL (ref 35.9–50)
GLOBULIN SER CALC-MCNC: 2.4 G/DL (ref 1.9–3.5)
GLUCOSE SERPL-MCNC: 102 MG/DL (ref 65–99)
HCT VFR BLD AUTO: 45.9 % (ref 42–52)
HGB BLD-MCNC: 15.5 G/DL (ref 14–18)
IMM GRANULOCYTES # BLD AUTO: 0.01 K/UL (ref 0–0.11)
IMM GRANULOCYTES NFR BLD AUTO: 0.2 % (ref 0–0.9)
INR PPP: 1.15 (ref 0.87–1.13)
LYMPHOCYTES # BLD AUTO: 2.18 K/UL (ref 1–4.8)
LYMPHOCYTES NFR BLD: 37.1 % (ref 22–41)
MCH RBC QN AUTO: 29.9 PG (ref 27–33)
MCHC RBC AUTO-ENTMCNC: 33.8 G/DL (ref 33.7–35.3)
MCV RBC AUTO: 88.4 FL (ref 81.4–97.8)
MONOCYTES # BLD AUTO: 0.51 K/UL (ref 0–0.85)
MONOCYTES NFR BLD AUTO: 8.7 % (ref 0–13.4)
NEUTROPHILS # BLD AUTO: 3.1 K/UL (ref 1.82–7.42)
NEUTROPHILS NFR BLD: 52.6 % (ref 44–72)
NRBC # BLD AUTO: 0 K/UL
NRBC BLD-RTO: 0 /100 WBC
PLATELET # BLD AUTO: 196 K/UL (ref 164–446)
PMV BLD AUTO: 9.5 FL (ref 9–12.9)
POTASSIUM SERPL-SCNC: 4.7 MMOL/L (ref 3.6–5.5)
PROT SERPL-MCNC: 6.7 G/DL (ref 6–8.2)
PROTHROMBIN TIME: 14.4 SEC (ref 12–14.6)
RBC # BLD AUTO: 5.19 M/UL (ref 4.7–6.1)
SARS-COV-2 RNA RESP QL NAA+PROBE: NOTDETECTED
SODIUM SERPL-SCNC: 140 MMOL/L (ref 135–145)
SPECIMEN SOURCE: NORMAL
WBC # BLD AUTO: 5.9 K/UL (ref 4.8–10.8)

## 2022-03-04 PROCEDURE — 85610 PROTHROMBIN TIME: CPT

## 2022-03-04 PROCEDURE — 80053 COMPREHEN METABOLIC PANEL: CPT

## 2022-03-04 PROCEDURE — C9803 HOPD COVID-19 SPEC COLLECT: HCPCS

## 2022-03-04 PROCEDURE — U0005 INFEC AGEN DETEC AMPLI PROBE: HCPCS

## 2022-03-04 PROCEDURE — 36415 COLL VENOUS BLD VENIPUNCTURE: CPT

## 2022-03-04 PROCEDURE — 93010 ELECTROCARDIOGRAM REPORT: CPT | Performed by: INTERNAL MEDICINE

## 2022-03-04 PROCEDURE — 85025 COMPLETE CBC W/AUTO DIFF WBC: CPT

## 2022-03-04 PROCEDURE — U0003 INFECTIOUS AGENT DETECTION BY NUCLEIC ACID (DNA OR RNA); SEVERE ACUTE RESPIRATORY SYNDROME CORONAVIRUS 2 (SARS-COV-2) (CORONAVIRUS DISEASE [COVID-19]), AMPLIFIED PROBE TECHNIQUE, MAKING USE OF HIGH THROUGHPUT TECHNOLOGIES AS DESCRIBED BY CMS-2020-01-R: HCPCS

## 2022-03-04 PROCEDURE — 93005 ELECTROCARDIOGRAM TRACING: CPT

## 2022-03-04 ASSESSMENT — FIBROSIS 4 INDEX: FIB4 SCORE: 1.45

## 2022-03-08 ENCOUNTER — HOSPITAL ENCOUNTER (OUTPATIENT)
Facility: MEDICAL CENTER | Age: 72
End: 2022-03-08
Attending: INTERNAL MEDICINE | Admitting: INTERNAL MEDICINE
Payer: MEDICARE

## 2022-03-08 ENCOUNTER — APPOINTMENT (OUTPATIENT)
Dept: CARDIOLOGY | Facility: MEDICAL CENTER | Age: 72
End: 2022-03-08
Attending: INTERNAL MEDICINE
Payer: MEDICARE

## 2022-03-08 ENCOUNTER — ANESTHESIA EVENT (OUTPATIENT)
Dept: CARDIOLOGY | Facility: MEDICAL CENTER | Age: 72
End: 2022-03-08
Payer: MEDICARE

## 2022-03-08 ENCOUNTER — ANESTHESIA (OUTPATIENT)
Dept: CARDIOLOGY | Facility: MEDICAL CENTER | Age: 72
End: 2022-03-08
Payer: MEDICARE

## 2022-03-08 VITALS
SYSTOLIC BLOOD PRESSURE: 110 MMHG | BODY MASS INDEX: 27.7 KG/M2 | OXYGEN SATURATION: 97 % | TEMPERATURE: 97.6 F | DIASTOLIC BLOOD PRESSURE: 71 MMHG | RESPIRATION RATE: 12 BRPM | HEART RATE: 66 BPM | WEIGHT: 234.57 LBS | HEIGHT: 77 IN

## 2022-03-08 DIAGNOSIS — Z98.890 S/P ABLATION OF ATRIAL FIBRILLATION: ICD-10-CM

## 2022-03-08 DIAGNOSIS — Z86.79 S/P ABLATION OF ATRIAL FIBRILLATION: ICD-10-CM

## 2022-03-08 DIAGNOSIS — I48.0 PAROXYSMAL ATRIAL FIBRILLATION (HCC): ICD-10-CM

## 2022-03-08 LAB
ACT BLD: 303 SEC (ref 74–137)
ACT BLD: 327 SEC (ref 74–137)
ACT BLD: 380 SEC (ref 74–137)
EKG IMPRESSION: NORMAL
INR PPP: 1.16 (ref 0.87–1.13)
PROTHROMBIN TIME: 14.4 SEC (ref 12–14.6)

## 2022-03-08 PROCEDURE — 160047 HCHG PACU  - EA ADDL 30 MINS PHASE II

## 2022-03-08 PROCEDURE — 93312 ECHO TRANSESOPHAGEAL: CPT

## 2022-03-08 PROCEDURE — 93623 PRGRMD STIMJ&PACG IV RX NFS: CPT | Mod: 26 | Performed by: INTERNAL MEDICINE

## 2022-03-08 PROCEDURE — A9270 NON-COVERED ITEM OR SERVICE: HCPCS | Performed by: ANESTHESIOLOGY

## 2022-03-08 PROCEDURE — 93325 DOPPLER ECHO COLOR FLOW MAPG: CPT | Mod: 26 | Performed by: INTERNAL MEDICINE

## 2022-03-08 PROCEDURE — 700102 HCHG RX REV CODE 250 W/ 637 OVERRIDE(OP): Performed by: ANESTHESIOLOGY

## 2022-03-08 PROCEDURE — 93657 TX L/R ATRIAL FIB ADDL: CPT | Performed by: INTERNAL MEDICINE

## 2022-03-08 PROCEDURE — 700101 HCHG RX REV CODE 250: Performed by: INTERNAL MEDICINE

## 2022-03-08 PROCEDURE — 160002 HCHG RECOVERY MINUTES (STAT)

## 2022-03-08 PROCEDURE — 93010 ELECTROCARDIOGRAM REPORT: CPT | Performed by: INTERNAL MEDICINE

## 2022-03-08 PROCEDURE — 85610 PROTHROMBIN TIME: CPT

## 2022-03-08 PROCEDURE — 160046 HCHG PACU - 1ST 60 MINS PHASE II

## 2022-03-08 PROCEDURE — 85347 COAGULATION TIME ACTIVATED: CPT

## 2022-03-08 PROCEDURE — 700105 HCHG RX REV CODE 258: Performed by: INTERNAL MEDICINE

## 2022-03-08 PROCEDURE — 93312 ECHO TRANSESOPHAGEAL: CPT | Mod: 26 | Performed by: INTERNAL MEDICINE

## 2022-03-08 PROCEDURE — C1894 INTRO/SHEATH, NON-LASER: HCPCS

## 2022-03-08 PROCEDURE — 700101 HCHG RX REV CODE 250: Performed by: ANESTHESIOLOGY

## 2022-03-08 PROCEDURE — 700111 HCHG RX REV CODE 636 W/ 250 OVERRIDE (IP)

## 2022-03-08 PROCEDURE — 160036 HCHG PACU - EA ADDL 30 MINS PHASE I

## 2022-03-08 PROCEDURE — 93656 COMPRE EP EVAL ABLTJ ATR FIB: CPT | Performed by: INTERNAL MEDICINE

## 2022-03-08 PROCEDURE — 160035 HCHG PACU - 1ST 60 MINS PHASE I

## 2022-03-08 PROCEDURE — 93655 ICAR CATH ABLTJ DSCRT ARRHYT: CPT | Performed by: INTERNAL MEDICINE

## 2022-03-08 PROCEDURE — 93005 ELECTROCARDIOGRAM TRACING: CPT | Performed by: INTERNAL MEDICINE

## 2022-03-08 PROCEDURE — 700101 HCHG RX REV CODE 250

## 2022-03-08 PROCEDURE — 700111 HCHG RX REV CODE 636 W/ 250 OVERRIDE (IP): Performed by: ANESTHESIOLOGY

## 2022-03-08 RX ORDER — HYDROMORPHONE HYDROCHLORIDE 1 MG/ML
0.2 INJECTION, SOLUTION INTRAMUSCULAR; INTRAVENOUS; SUBCUTANEOUS
Status: DISCONTINUED | OUTPATIENT
Start: 2022-03-08 | End: 2022-03-08 | Stop reason: HOSPADM

## 2022-03-08 RX ORDER — HEPARIN SODIUM 200 [USP'U]/100ML
INJECTION, SOLUTION INTRAVENOUS
Status: COMPLETED
Start: 2022-03-08 | End: 2022-03-08

## 2022-03-08 RX ORDER — HYDROMORPHONE HYDROCHLORIDE 1 MG/ML
0.1 INJECTION, SOLUTION INTRAMUSCULAR; INTRAVENOUS; SUBCUTANEOUS
Status: DISCONTINUED | OUTPATIENT
Start: 2022-03-08 | End: 2022-03-08 | Stop reason: HOSPADM

## 2022-03-08 RX ORDER — ISOPROTERENOL HYDROCHLORIDE 0.2 MG/ML
INJECTION, SOLUTION INTRAVENOUS
Status: COMPLETED
Start: 2022-03-08 | End: 2022-03-08

## 2022-03-08 RX ORDER — ONDANSETRON 2 MG/ML
4 INJECTION INTRAMUSCULAR; INTRAVENOUS
Status: DISCONTINUED | OUTPATIENT
Start: 2022-03-08 | End: 2022-03-08 | Stop reason: HOSPADM

## 2022-03-08 RX ORDER — HEPARIN SODIUM 1000 [USP'U]/ML
INJECTION, SOLUTION INTRAVENOUS; SUBCUTANEOUS
Status: COMPLETED
Start: 2022-03-08 | End: 2022-03-08

## 2022-03-08 RX ORDER — HALOPERIDOL 5 MG/ML
1 INJECTION INTRAMUSCULAR
Status: DISCONTINUED | OUTPATIENT
Start: 2022-03-08 | End: 2022-03-08 | Stop reason: HOSPADM

## 2022-03-08 RX ORDER — OXYCODONE HCL 5 MG/5 ML
10 SOLUTION, ORAL ORAL
Status: DISCONTINUED | OUTPATIENT
Start: 2022-03-08 | End: 2022-03-08 | Stop reason: HOSPADM

## 2022-03-08 RX ORDER — ONDANSETRON 2 MG/ML
INJECTION INTRAMUSCULAR; INTRAVENOUS PRN
Status: DISCONTINUED | OUTPATIENT
Start: 2022-03-08 | End: 2022-03-08 | Stop reason: SURG

## 2022-03-08 RX ORDER — DIPHENHYDRAMINE HYDROCHLORIDE 50 MG/ML
12.5 INJECTION INTRAMUSCULAR; INTRAVENOUS
Status: DISCONTINUED | OUTPATIENT
Start: 2022-03-08 | End: 2022-03-08 | Stop reason: HOSPADM

## 2022-03-08 RX ORDER — ACETAMINOPHEN 500 MG
1000 TABLET ORAL EVERY 4 HOURS PRN
Status: DISCONTINUED | OUTPATIENT
Start: 2022-03-08 | End: 2022-03-08 | Stop reason: HOSPADM

## 2022-03-08 RX ORDER — OXYCODONE HCL 5 MG/5 ML
5 SOLUTION, ORAL ORAL
Status: DISCONTINUED | OUTPATIENT
Start: 2022-03-08 | End: 2022-03-08 | Stop reason: HOSPADM

## 2022-03-08 RX ORDER — DEXAMETHASONE SODIUM PHOSPHATE 4 MG/ML
INJECTION, SOLUTION INTRA-ARTICULAR; INTRALESIONAL; INTRAMUSCULAR; INTRAVENOUS; SOFT TISSUE PRN
Status: DISCONTINUED | OUTPATIENT
Start: 2022-03-08 | End: 2022-03-08 | Stop reason: SURG

## 2022-03-08 RX ORDER — PROTAMINE SULFATE 10 MG/ML
INJECTION, SOLUTION INTRAVENOUS
Status: COMPLETED
Start: 2022-03-08 | End: 2022-03-08

## 2022-03-08 RX ORDER — LIDOCAINE HYDROCHLORIDE 20 MG/ML
INJECTION, SOLUTION INFILTRATION; PERINEURAL
Status: COMPLETED
Start: 2022-03-08 | End: 2022-03-08

## 2022-03-08 RX ORDER — BUPIVACAINE HYDROCHLORIDE 2.5 MG/ML
INJECTION, SOLUTION EPIDURAL; INFILTRATION; INTRACAUDAL
Status: COMPLETED
Start: 2022-03-08 | End: 2022-03-08

## 2022-03-08 RX ORDER — SODIUM CHLORIDE, SODIUM LACTATE, POTASSIUM CHLORIDE, CALCIUM CHLORIDE 600; 310; 30; 20 MG/100ML; MG/100ML; MG/100ML; MG/100ML
INJECTION, SOLUTION INTRAVENOUS CONTINUOUS
Status: ACTIVE | OUTPATIENT
Start: 2022-03-08 | End: 2022-03-08

## 2022-03-08 RX ORDER — MIDAZOLAM HYDROCHLORIDE 1 MG/ML
1 INJECTION INTRAMUSCULAR; INTRAVENOUS
Status: DISCONTINUED | OUTPATIENT
Start: 2022-03-08 | End: 2022-03-08 | Stop reason: HOSPADM

## 2022-03-08 RX ORDER — OMEPRAZOLE 20 MG/1
20 CAPSULE, DELAYED RELEASE ORAL DAILY
Qty: 30 CAPSULE | Refills: 0 | Status: SHIPPED | OUTPATIENT
Start: 2022-03-08 | End: 2022-08-29 | Stop reason: SDUPTHER

## 2022-03-08 RX ORDER — PHENYLEPHRINE HYDROCHLORIDE 10 MG/ML
INJECTION, SOLUTION INTRAMUSCULAR; INTRAVENOUS; SUBCUTANEOUS PRN
Status: DISCONTINUED | OUTPATIENT
Start: 2022-03-08 | End: 2022-03-08 | Stop reason: SURG

## 2022-03-08 RX ORDER — HYDROMORPHONE HYDROCHLORIDE 1 MG/ML
0.4 INJECTION, SOLUTION INTRAMUSCULAR; INTRAVENOUS; SUBCUTANEOUS
Status: DISCONTINUED | OUTPATIENT
Start: 2022-03-08 | End: 2022-03-08 | Stop reason: HOSPADM

## 2022-03-08 RX ORDER — NEOSTIGMINE METHYLSULFATE 1 MG/ML
INJECTION, SOLUTION INTRAVENOUS PRN
Status: DISCONTINUED | OUTPATIENT
Start: 2022-03-08 | End: 2022-03-08 | Stop reason: SURG

## 2022-03-08 RX ORDER — MEPERIDINE HYDROCHLORIDE 25 MG/ML
12.5 INJECTION INTRAMUSCULAR; INTRAVENOUS; SUBCUTANEOUS
Status: DISCONTINUED | OUTPATIENT
Start: 2022-03-08 | End: 2022-03-08 | Stop reason: HOSPADM

## 2022-03-08 RX ORDER — SODIUM CHLORIDE, SODIUM LACTATE, POTASSIUM CHLORIDE, CALCIUM CHLORIDE 600; 310; 30; 20 MG/100ML; MG/100ML; MG/100ML; MG/100ML
INJECTION, SOLUTION INTRAVENOUS CONTINUOUS
Status: DISCONTINUED | OUTPATIENT
Start: 2022-03-08 | End: 2022-03-08 | Stop reason: HOSPADM

## 2022-03-08 RX ADMIN — LIDOCAINE HYDROCHLORIDE: 20 INJECTION, SOLUTION INFILTRATION; PERINEURAL at 11:14

## 2022-03-08 RX ADMIN — PHENYLEPHRINE HYDROCHLORIDE 200 MCG: 10 INJECTION INTRAVENOUS at 11:44

## 2022-03-08 RX ADMIN — FENTANYL CITRATE 300 MCG: 50 INJECTION, SOLUTION INTRAMUSCULAR; INTRAVENOUS at 11:04

## 2022-03-08 RX ADMIN — PHENYLEPHRINE HYDROCHLORIDE 200 MCG: 10 INJECTION INTRAVENOUS at 11:10

## 2022-03-08 RX ADMIN — BUPIVACAINE HYDROCHLORIDE: 2.5 INJECTION, SOLUTION EPIDURAL; INFILTRATION; INTRACAUDAL; PERINEURAL at 11:14

## 2022-03-08 RX ADMIN — DEXAMETHASONE SODIUM PHOSPHATE 4 MG: 4 INJECTION, SOLUTION INTRA-ARTICULAR; INTRALESIONAL; INTRAMUSCULAR; INTRAVENOUS; SOFT TISSUE at 11:27

## 2022-03-08 RX ADMIN — ROCURONIUM BROMIDE 50 MG: 10 INJECTION, SOLUTION INTRAVENOUS at 11:05

## 2022-03-08 RX ADMIN — PROPOFOL 160 MG: 10 INJECTION, EMULSION INTRAVENOUS at 11:05

## 2022-03-08 RX ADMIN — LIDOCAINE HYDROCHLORIDE 0.5 ML: 10 INJECTION, SOLUTION EPIDURAL; INFILTRATION; INTRACAUDAL; PERINEURAL at 08:47

## 2022-03-08 RX ADMIN — ISOPROTERENOL HYDROCHLORIDE 200 MCG: 0.2 INJECTION, SOLUTION INTRAMUSCULAR; INTRAVENOUS at 12:59

## 2022-03-08 RX ADMIN — ONDANSETRON 4 MG: 2 INJECTION INTRAMUSCULAR; INTRAVENOUS at 11:27

## 2022-03-08 RX ADMIN — HEPARIN SODIUM 15000 UNITS: 1000 INJECTION, SOLUTION INTRAVENOUS; SUBCUTANEOUS at 11:34

## 2022-03-08 RX ADMIN — HEPARIN SODIUM 6000 UNITS: 200 INJECTION, SOLUTION INTRAVENOUS at 11:14

## 2022-03-08 RX ADMIN — SODIUM CHLORIDE, POTASSIUM CHLORIDE, SODIUM LACTATE AND CALCIUM CHLORIDE: 600; 310; 30; 20 INJECTION, SOLUTION INTRAVENOUS at 08:47

## 2022-03-08 RX ADMIN — PROTAMINE SULFATE 50 MG: 10 INJECTION, SOLUTION INTRAVENOUS at 13:17

## 2022-03-08 RX ADMIN — GLYCOPYRROLATE 1 MG: 0.2 INJECTION INTRAMUSCULAR; INTRAVENOUS at 13:16

## 2022-03-08 RX ADMIN — PHENYLEPHRINE HYDROCHLORIDE 200 MCG: 10 INJECTION INTRAVENOUS at 11:28

## 2022-03-08 RX ADMIN — NEOSTIGMINE METHYLSULFATE 5 MG: 1 INJECTION INTRAVENOUS at 13:16

## 2022-03-08 ASSESSMENT — PAIN DESCRIPTION - PAIN TYPE
TYPE: ACUTE PAIN

## 2022-03-08 ASSESSMENT — FIBROSIS 4 INDEX: FIB4 SCORE: 0.98

## 2022-03-08 ASSESSMENT — PAIN SCALES - GENERAL: PAIN_LEVEL: 0

## 2022-03-08 NOTE — H&P
Please see H&P (incorrectly labeled as OP Report) signed 10:41 AM    Yobany Gray MD  Cardiac Electrophysiology

## 2022-03-08 NOTE — OR NURSING
Pt A&Ox4. VSS on RA. Pt denies pain and nausea. Bilateral groin sheath sites CDI.   Pt tolerating PO.   Pt's wife called and updated.     1545: Pt ambulated around PACU with RN. No changes in groin sites.     Report called to NERY Sanchez and pt transported to phase II via [facility].

## 2022-03-08 NOTE — ANESTHESIA TIME REPORT
Anesthesia Start and Stop Event Times     Date Time Event    3/8/2022 1033 Ready for Procedure     1054 Anesthesia Start     1340 Anesthesia Stop        Responsible Staff  03/08/22    Name Role Begin End    Wilbert Suero M.D. Anesth 1054 1340        Preop Diagnosis (Free Text):  Pre-op Diagnosis             Preop Diagnosis (Codes):    Premium Reason  Non-Premium    Comments:

## 2022-03-08 NOTE — ANESTHESIA POSTPROCEDURE EVALUATION
Patient: Gopal Mallory Jr.    Procedure Summary     Date: 03/08/22 Room / Location: [facility] CATH [facility]    Anesthesia Start: 1054 Anesthesia Stop: 1340    Procedure: CL-EP ABLATION ATRIAL FIBRILLATION Diagnosis:       Paroxysmal atrial fibrillation (HCC)      Paroxysmal atrial fibrillation      (See Associated Dx)    Scheduled Providers: Yobany Gray M.D.; Wlibert Suero M.D. Responsible Provider: Wilbert Suero M.D.    Anesthesia Type: general ASA Status: 3          Final Anesthesia Type: general  Last vitals  BP   NIBP: 129/84    Temp   35.9 °C (96.7 °F)    Pulse   67   Resp   16    SpO2   98 %      Anesthesia Post Evaluation    Patient location during evaluation: PACU  Patient participation: complete - patient participated  Level of consciousness: awake and alert  Pain score: 0    Airway patency: patent  Anesthetic complications: no  Cardiovascular status: hemodynamically stable  Respiratory status: acceptable  Hydration status: euvolemic    PONV: none          No complications documented.     Nurse Pain Score: 0 (NPRS)

## 2022-03-08 NOTE — H&P
"[facility]  Electrophysiology Pre-procedure H&P    DOS:3/8/2022    Planned Procedure: Afib ablation    Chief complaint/Reason for Procedure: Afib    HPI: 70 y/o M with persistent Afib for ablation, no new complaints      Past Medical History:   Diagnosis Date   • Arrhythmia     Afib   • Arthritis 03/04/2022    Knuckles and knees.   • Bowel habit changes 03/04/2022    Constipation   • Cancer (HCC)     Basal cell/ Squamous skin   • Dental disorder 03/04/2022    Waiting for implant front top tooth.  Tooth pulled a couple of months ago.   • GERD (gastroesophageal reflux disease)    • Hypertension    • Parkinson disease (HCC)    • Rash 03/04/2022    \"Jock Rash\"   • Snoring     Negative sleep study.       Past Surgical History:   Procedure Laterality Date   • DENTAL EXTRACTION(S)      wisdom teeth removed at age 18   • OTHER      mohs to nose, back       Social History     Socioeconomic History   • Marital status:      Spouse name: Not on file   • Number of children: Not on file   • Years of education: Not on file   • Highest education level: Bachelor's degree (e.g., BA, AB, BS)   Occupational History   • Not on file   Tobacco Use   • Smoking status: Never Smoker   • Smokeless tobacco: Never Used   Vaping Use   • Vaping Use: Never used   Substance and Sexual Activity   • Alcohol use: Yes     Alcohol/week: 1.2 oz     Types: 2 Glasses of wine per week     Comment: 2 glasses wine/night   • Drug use: No   • Sexual activity: Yes     Partners: Female   Other Topics Concern   • Not on file   Social History Narrative   • Not on file     Social Determinants of Health     Financial Resource Strain: Low Risk    • Difficulty of Paying Living Expenses: Not hard at all   Food Insecurity: No Food Insecurity   • Worried About Running Out of Food in the Last Year: Never true   • Ran Out of Food in the Last Year: Never true   Transportation Needs: No Transportation Needs   • Lack of Transportation (Medical): No " "  • Lack of Transportation (Non-Medical): No   Physical Activity: Sufficiently Active   • Days of Exercise per Week: 4 days   • Minutes of Exercise per Session: 50 min   Stress: No Stress Concern Present   • Feeling of Stress : Only a little   Social Connections: Moderately Integrated   • Frequency of Communication with Friends and Family: More than three times a week   • Frequency of Social Gatherings with Friends and Family: More than three times a week   • Attends Rastafarian Services: Never   • Active Member of Clubs or Organizations: Yes   • Attends Club or Organization Meetings: More than 4 times per year   • Marital Status:    Intimate Partner Violence: Not on file   Housing Stability: Low Risk    • Unable to Pay for Housing in the Last Year: No   • Number of Places Lived in the Last Year: 1   • Unstable Housing in the Last Year: No       Family History   Problem Relation Age of Onset   • Cancer Mother         bladder non-smoker   • Dementia Father    • Cancer Brother         prostate age 52       Allergies   Allergen Reactions   • Molds & Smuts Unspecified     Sneezing       Current Facility-Administered Medications   Medication Dose Route Frequency Provider Last Rate Last Admin   • lidocaine (XYLOCAINE) 1 % injection 0.5 mL  0.5 mL Intradermal Once PRN Yobany Gray M.D.   0.5 mL at 03/08/22 0847   • lactated ringers infusion   Intravenous Continuous Yobany Gray M.D. 10 mL/hr at 03/08/22 0847 New Bag at 03/08/22 0847       Physical Exam:  Vitals:    03/04/22 1027 03/08/22 0826   Pulse:  67   Resp:  16   Temp:  35.9 °C (96.7 °F)   TempSrc:  Temporal   SpO2:  98%   Weight: 110 kg (241 lb 10 oz) 106 kg (234 lb 9.1 oz)   Height: 1.956 m (6' 5\") 1.956 m (6' 5\")     General appearance: NAD, conversant   Neck: Trachea midline; FROM, supple, no thyromegaly or lymphadenopathy  CV: RRR, no MRGs, no JVD   Extremities: No peripheral edema or extremity lymphadenopathy  Skin: Normal temperature, turgor and " texture; no rash, ulcers or subcutaneous nodules  Psych: Appropriate affect, alert and oriented to person, place and time    Data:  Lab Results   Component Value Date/Time    CHOLSTRLTOT 151 01/03/2022 07:53 AM    LDL 85 01/03/2022 07:53 AM    HDL 42 01/03/2022 07:53 AM    TRIGLYCERIDE 118 01/03/2022 07:53 AM       Lab Results   Component Value Date/Time    SODIUM 140 03/04/2022 10:58 AM    POTASSIUM 4.7 03/04/2022 10:58 AM    CHLORIDE 103 03/04/2022 10:58 AM    CO2 29 03/04/2022 10:58 AM    GLUCOSE 102 (H) 03/04/2022 10:58 AM    BUN 17 03/04/2022 10:58 AM    CREATININE 1.04 03/04/2022 10:58 AM     Lab Results   Component Value Date/Time    ALKPHOSPHAT 68 03/04/2022 10:58 AM    ASTSGOT 13 03/04/2022 10:58 AM    ALTSGPT 22 03/04/2022 10:58 AM    TBILIRUBIN 0.6 03/04/2022 10:58 AM      No results found for: BNPBTYPENAT            EKG interpreted by me: Afib    Impression/Plan:  1) Persistent Afib    - We discussed pulmonary vein isolation for therapeutic management and continued rhythm control.  We discussed the risks and benefits of this procedure.  Risks include 1-3% risk of major cardiovascular event including stroke, myocardial infarction, phrenic nerve damage, esophageal injury and/or fistula formation, cardiac perforation, pericardial effusion, tamponade, major bleeding, or death.  I quoted a 60% chance free of atrial fibrillation at 12 months.  We discussed that he may also need a second procedure.    Plan ablation, PVI/PWI and RAMON      Yobany Gray MD  Cardiac Electrophysiology

## 2022-03-08 NOTE — ANESTHESIA PROCEDURE NOTES
Airway    Date/Time: 3/8/2022 11:07 AM  Performed by: Wilbert Suero M.D.  Authorized by: Wilbert Suero M.D.     Location:  OR  Urgency:  Elective  Indications for Airway Management:  Anesthesia      Spontaneous Ventilation: absent    Sedation Level:  Deep  Preoxygenated: Yes    Patient Position:  Sniffing  Mask Difficulty Assessment:  1 - vent by mask  Final Airway Type:  Endotracheal airway  Final Endotracheal Airway:  ETT  Cuffed: Yes    Technique Used for Successful ETT Placement:  Direct laryngoscopy    Insertion Site:  Oral  Blade Type:  Sandra  Laryngoscope Blade/Videolaryngoscope Blade Size:  3  ETT Size (mm):  7.0  Measured from:  Teeth  ETT to Teeth (cm):  26  Placement Verified by: auscultation and capnometry    Cormack-Lehane Classification:  Grade IIa - partial view of glottis  Number of Attempts at Approach:  1

## 2022-03-08 NOTE — OP REPORT
Electrophysiology Procedure Note  [facility]    Procedures Performed:  Pulmonary Vein Isolation  Additional ablation for atrial fibrillation x2  Ablation of additional arrhythmia x2  Intracardiac Echocardiography  Three-dimensional intracardiac mapping  IV isoproterenol infusion with programmed stimulation  Trans-esophageal echocardiogram    Electrophysiologist: Yobany Gray MD    Assistant(s): None    Anesthesia: General anesthesia was provided by Dr. Suero of the Anesthesiology service.    Statement of Medical Necessity: This is a 71  year-old male with history of symptomatic persistent atrial  fibrillation     Pre-procedure ECG: Afib    Post-procedure ECG: Sinus     Description of Procedure:    Access and catheter placement: After obtaining informed written consent, the patient was  brought to the EP lab in the fasting, non-sedated stated. The patient was sedated and intubated  by the anesthesiologist. The patient was prepped and draped in the usual sterile fashion. Using  the modified Seldinger technique, access was obtained in bilateral  femoral veins. Guidewires were advanced into the IVC. In the right femoral vein, 2 sheaths of   8F were placed. In the left femoral vein 2 sheaths of 8F were placed. A deflectable  decapolar catheter was advanced through the LFV to the coronary sinus. An 8F intracardiac  echo catheter was advanced to the right atrium. Through one of the 8F short sheaths in the RFV,  a long wire was advanced to the SVC. The short sheath was  changed out for an 8.5 F braided Tor-flex ([employer] [facility]) sheath which was advanced to the SVC. The wire was  removed and the dilator was flushed. A 71-cm transseptal needle ([employer] [facility]) was advanced to the tip of the  dilator, and the obturator was removed. The transseptal needle was attached to the manifold and  flushed. Under intracardiac echocardiographic guidance, the sheath/needle   system was withdrawn to the fossa ovalis. At this time,  15,000 units of heparin were administered.   Additional boluses of heparin were given as needed to keep -350 sec during LA dwell time.   The needle was advanced out of the dilator, and RF energy applied via the [place] needle.   ICE visualized the needle passage through the fossa ovalis into the left  atrium. Confirmation of left atrial location was confirmed by injecting saline and transducing  pressure. The dilator was advanced over the needle into the left atrium, and then the sheath was advanced over the dilator. The dilator and  needle were removed. The sheath was flushed and connected to a continuous heparinized   saline drip.  The transseptal catheterization  procedure was repeated through the second RFV access site a 98-cm transseptal needle  and a medium-curl Vizigo ([employer]) sheath. Left atrial location was again confirmed by injecting saline  and transducing pressure. The needle and dilator were removed. The sheath was attached to  the drip line and flushed.     A 3.5-mm externally-irrigated ablation catheter ([employer]   Thermocool ST SF DF-curve) was advanced through the Vizigo sheath into the left atrium. A duodecapolar Penta-Ray catheter ([employer]) was  advanced through the Tor-flex sheath into the left atrium. A 3-dimensional electroanatomical  map was constructed using the CARTO system.    Attention was first turned to the left pulmonary veins. The Penta-Ray  catheter was placed in the LSPV and LIPV which showed conduction into the vein. A circumferential  ablation line using radiofrequency energy 20-40W was placed which resulted in LSPV and LIPV isolation.  The catheters were then moved to the RSPV and RIPV which showed evidence of conduction into the veins,   and ablation 20-40W was performed circumferentially around these veins   resulting in RSPV and RIPV isolation. Power was reduced near the esophagus.  The Penta-Ray was advanced into all four pulmonary veins and  persistent conduction block into the right and left pulmonary veins was demonstrated.     The posterior wall was evaluated as an additional target for treatment of atrial fibrillation, and posterior wall isolation was performed with roof and floor lines, 20-40W, resulting in isolation of the posterior wall. Additional debulking of the posterior wall was needed to complete isolation.    The patient remained in atrial fibrillation, so mapping was performed to identify focal areas of rotational activity or drivers with CFAE mapping. Ablation was performed at locations in the low posterior septum, the CARIDAD aspect of the ridge, and the anterior wall base of the left atrial appendage, all at 40W.     The patient remained in atrial fibrillation so a single 200J external shock was applied, restoring sinus rhythm.     The posterior wall appeared isolated but pacing revealed exit block was not present. Further ablation at sites of capture were performed at 30-40W. After ablation, no local capture was seen in the posterior wall.    Isuprel 2-4mcg/min was administered and burst pacing was performed in the left atrium. Atrial flutter was induced, TCL 240ms. This was mapped in the left atrium with apparent right sided activation.   The left atrial sheaths and catheters were withdrawn to the right atrium. Ablation was performed along the CTI at 35W to create a line of block. The tachycardia sped up to 220ms but did not terminate. We then elected to remap, and the LAT map of the right atrium showed no conduction through the CTI. We remapped on the left atrium and found an area of apparent focal microreentry on the anterior septum. Ablation here at 40W terminated the flutter. Catheters were withdrawn to the right atrium and pacing across the CTI line showed bidirectional block >170ms.    ICE visualization   of the pericardium confirmed no pericardial effusion at the end of the case. The  patient was awakened from anesthesia,  catheters and sheaths were removed, Vascade MVP closure devices were deployed, and manual  pressure was held on bilateral groins until hemostasis was achieved.    Electrophysiological Findings:  Sinus cycle length 1367 msec  Intervals-   H-V 53 msec  QRS 95 msec   msec   msec  AV block  ms  AVERP: 600/340 ms    Total RF time: 2156 sec  Fluoro time: 0 min    Arrhythmias:  Atrial fibrillation was present at baseline  Tachycardias noted:  1) Typical RA flutter  2) Atypical LA flutter    Estimated blood loss - 30 mL    Complications: None    Impression:  1. Atrial fibrillation, persistent  2. Successful isolation of all four pulmonary veins  3. Successful posterior wall isolation, and focal CFAE ablation for additional treatment of atrial fibrillation  4. Typical RA flutter and atypical LA flutter, microreentry  5. Successful CTI ablation with bidirectional block for RA flutter  6. Successful ablation of septal LA atypical flutter    Recommendations:  1. Bed rest for 2 hours  2. Telemetry monitoring during recovery  3. Anticoagulant therapy for at least 6-12 months  4. Follow up in Arrhythmia clinic in 4 weeks      Yobany Gray MD  Cardiac Electrophysiology

## 2022-03-08 NOTE — ANESTHESIA PREPROCEDURE EVALUATION
Date/Time: 03/08/22 1030    Scheduled providers: Yobany Gray M.D.; Wilbert Suero M.D.    Procedure: CL-EP ABLATION ATRIAL FIBRILLATION    Diagnosis:       Paroxysmal atrial fibrillation (HCC) [I48.0]      Paroxysmal atrial fibrillation [I48.0]    Indications: See Associated Dx    Location: [facility] IMAGING - CATH LAB - [facility]          Relevant Problems   NEURO   (positive) History of basal cell carcinoma (BCC) of skin   (positive) History of varicocele      CARDIAC   (positive) Essential hypertension   (positive) Paroxysmal atrial fibrillation (HCC)      GI   (positive) Gastroesophageal reflux disease without esophagitis       Physical Exam    Airway   Mallampati: II  TM distance: >3 FB  Neck ROM: full       Cardiovascular - normal exam  Rhythm: regular  Rate: normal  (-) murmur     Dental - normal exam           Pulmonary - normal exam  Breath sounds clear to auscultation     Abdominal    Neurological - normal exam                 Anesthesia Plan    ASA 3       Plan - general       Airway plan will be ETT          Induction: intravenous    Postoperative Plan: Postoperative administration of opioids is intended.    Pertinent diagnostic labs and testing reviewed    Informed Consent:    Anesthetic plan and risks discussed with patient.    Use of blood products discussed with: patient whom consented to blood products.       carmelita Friedman.

## 2022-03-09 NOTE — PROGRESS NOTES
"Chief Complaint   Patient presents with   • Atrial Fibrillation     F/V Dx: Paroxysmal atrial fibrillation (HCC)           Subjective     Gopal Mallory Jr. is a 71 y.o. male who presents today for follow up after undergoing successful AF, typical and atypical flutter ablation on 3/8/2022.    Other past medical history significant for HTN, GERD and Parkinson's disease.     Patient contacted our office a few days ago stating that he was experiencing heart rates that were elevated for him in the 70's with K[employer]ia mobile reading \"undetermined\" and \"atrial fibrillation\".     Today patient states that he was feeling winded and slightly lightheaded with mild exertion prompting him to use his [employer] Mobile. He started feeling better yesterday and is feeling well this am. Reports that his bilateral femoral access sites are healing well with some mild bruising.     Patient has a dentist appointment on 4/22/22 to have a dental implant completed, his dentist would like for him to hold his OAC for a \"couple of days\".     Patient states that his BP was running in the low 100's, he starting breaking his valsartan in 1/2 and now his SBP is running in the one-teens and he feels well with that BP.     Past Medical History:   Diagnosis Date   • Arrhythmia     Afib   • Arthritis 03/04/2022    Knuckles and knees.   • Bowel habit changes 03/04/2022    Constipation   • Cancer (HCC)     Basal cell/ Squamous skin   • Dental disorder 03/04/2022    Waiting for implant front top tooth.  Tooth pulled a couple of months ago.   • GERD (gastroesophageal reflux disease)    • Hypertension    • Parkinson disease (HCC)    • Rash 03/04/2022    \"Jock Rash\"   • Snoring     Negative sleep study.     Past Surgical History:   Procedure Laterality Date   • DENTAL EXTRACTION(S)      wisdom teeth removed at age 18   • OTHER      mohs to nose, back     Family History   Problem Relation Age of Onset   • Cancer Mother         bladder non-smoker   • " Dementia Father    • Cancer Brother         prostate age 52     Social History     Socioeconomic History   • Marital status:      Spouse name: Not on file   • Number of children: Not on file   • Years of education: Not on file   • Highest education level: Bachelor's degree (e.g., BA, AB, BS)   Occupational History   • Not on file   Tobacco Use   • Smoking status: Never Smoker   • Smokeless tobacco: Never Used   Vaping Use   • Vaping Use: Never used   Substance and Sexual Activity   • Alcohol use: Yes     Alcohol/week: 1.2 oz     Types: 2 Glasses of wine per week     Comment: 2 glasses wine/night   • Drug use: No   • Sexual activity: Yes     Partners: Female   Other Topics Concern   • Not on file   Social History Narrative   • Not on file     Social Determinants of Health     Financial Resource Strain: Low Risk    • Difficulty of Paying Living Expenses: Not hard at all   Food Insecurity: No Food Insecurity   • Worried About Running Out of Food in the Last Year: Never true   • Ran Out of Food in the Last Year: Never true   Transportation Needs: No Transportation Needs   • Lack of Transportation (Medical): No   • Lack of Transportation (Non-Medical): No   Physical Activity: Sufficiently Active   • Days of Exercise per Week: 4 days   • Minutes of Exercise per Session: 50 min   Stress: No Stress Concern Present   • Feeling of Stress : Only a little   Social Connections: Moderately Integrated   • Frequency of Communication with Friends and Family: More than three times a week   • Frequency of Social Gatherings with Friends and Family: More than three times a week   • Attends [employer] Services: Never   • Active Member of Clubs or Organizations: Yes   • Attends Club or Organization Meetings: More than 4 times per year   • Marital Status:    Intimate Partner Violence: Not on file   Housing Stability: Low Risk    • Unable to Pay for Housing in the Last Year: No   • Number of Places Lived in the Last Year: 1   •  Unstable Housing in the Last Year: No     Allergies   Allergen Reactions   • Molds & Smuts Unspecified     Sneezing     Outpatient Encounter Medications as of 3/29/2022   Medication Sig Dispense Refill   • carbidopa-levodopa (SINEMET)  MG Tab TAKE 1 TABLET BY MOUTH TWICE A  Tablet 3   • rasagiline (AZILECT) 1 MG Tab Take 1 Tablet by mouth every day. 90 Tablet 3   • omeprazole (PRILOSEC) 20 MG delayed-release capsule Take 1 Capsule by mouth every day. Take every morning for 30 days post ablation to protect your esophagus. 30 Capsule 0   • fluorouracil (EFUDEX) 5 % cream AAA face BID X 2-4 weeks. Forehead, temples, cheeks and nose. Stop when red, crusted, blistered X 3-5 days 40 g 0   • nystatin (MYCOSTATIN) powder Apply 1 g topically 3 times a day. 15 g 2   • metoprolol tartrate (LOPRESSOR) 25 MG Tab TAKE 1 TABLET BY MOUTH TWICE A DAY (Patient taking differently: Take 25 mg by mouth 2 times a day.) 60 Tablet 11   • valsartan (DIOVAN) 80 MG Tab Take 80 mg by mouth every evening.     • apixaban (ELIQUIS) 5mg Tab TAKE 1 TABLET BY MOUTH TWICE A DAY (Patient taking differently: Take 5 mg by mouth 2 times a day. TAKE 1 TABLET BY MOUTH TWICE A DAY) 180 Tablet 3   • Cyanocobalamin (B-12 PO) Take 1 Tablet by mouth every day.     • Zinc Oxide-Vitamin C (ZINC PLUS VITAMIN C PO) Take 1 Tablet by mouth every day.     • triamcinolone acetonide (KENALOG) 0.1 % Cream AAA abdomen BID PRN rash.  Avoid use on sensitive sites.  If use on face, nose; limit to sparing amount 2X/week 60 g 1   • fluticasone (FLONASE) 50 MCG/ACT nasal spray Spray 1 Spray in nose 1 time daily as needed (ALLERGY).     • [DISCONTINUED] carbidopa-levodopa (SINEMET)  MG Tab TAKE 1 TABLET BY MOUTH TWICE A DAY (Patient taking differently: Take 1 Tablet by mouth 2 times a day. TAKE 1 TABLET BY MOUTH TWICE A DAY) 180 Tablet 3   • [DISCONTINUED] rasagiline (AZILECT) 1 MG Tab Take 1 Tablet by mouth every day. 90 Tablet 3   • [DISCONTINUED] lactated  "ringers infusion      • [DISCONTINUED] meperidine (DEMEROL) injection 12.5 mg      • [DISCONTINUED] midazolam (Versed) 2 MG/2ML injection 1 mg      • [DISCONTINUED] ondansetron (ZOFRAN) syringe/vial injection 4 mg      • [DISCONTINUED] haloperidol lactate (HALDOL) injection 1 mg      • [DISCONTINUED] diphenhydrAMINE (BENADRYL) injection 12.5 mg      • [DISCONTINUED] HYDROmorphone (Dilaudid) injection 0.1 mg      • [DISCONTINUED] HYDROmorphone (Dilaudid) injection 0.2 mg      • [DISCONTINUED] HYDROmorphone (Dilaudid) injection 0.4 mg      • [DISCONTINUED] oxyCODONE (ROXICODONE) oral solution 5 mg      • [DISCONTINUED] oxyCODONE (ROXICODONE) oral solution 10 mg      • [DISCONTINUED] ePHEDrine injection 5 mg      • [DISCONTINUED] albuterol (PROVENTIL) 2.5mg/0.5ml nebulizer solution 2.5 mg      • [DISCONTINUED] acetaminophen (TYLENOL) tablet 1,000 mg        No facility-administered encounter medications on file as of 3/29/2022.     Review of Systems   Constitutional: Negative for malaise/fatigue and weight loss.   Respiratory: Positive for shortness of breath (while in AF).    Cardiovascular: Negative for chest pain, palpitations, orthopnea, claudication, leg swelling and PND.   Neurological: Negative for dizziness and weakness.        Mild lightheadedness while in AF   All other systems reviewed and are negative.             Objective     /80 (BP Location: Left arm, Patient Position: Sitting, BP Cuff Size: Adult)   Pulse 64   Resp 16   Ht 1.956 m (6' 5\")   Wt 105 kg (232 lb)   SpO2 97%   BMI 27.51 kg/m²     Physical Exam  Constitutional:       General: He is not in acute distress.     Appearance: He is well-developed.   HENT:      Head: Normocephalic.   Eyes:      Extraocular Movements: Extraocular movements intact.   Neck:      Vascular: No carotid bruit or JVD.   Cardiovascular:      Rate and Rhythm: Regular rhythm. Bradycardia present.      Pulses:           Dorsalis pedis pulses are 2+ on the right side " and 2+ on the left side.        Posterior tibial pulses are 2+ on the right side and 2+ on the left side.      Heart sounds: Normal heart sounds.   Pulmonary:      Effort: Pulmonary effort is normal.      Breath sounds: Normal breath sounds.   Abdominal:      General: There is no distension.      Palpations: Abdomen is soft.   Musculoskeletal:      Cervical back: Normal range of motion.      Right lower leg: No edema.      Left lower leg: No edema.   Skin:     General: Skin is warm and dry.   Neurological:      Mental Status: He is alert and oriented to person, place, and time.   Psychiatric:         Mood and Affect: Mood normal.         Behavior: Behavior normal.         TTE 1/3/2022:  CONCLUSIONS  Prior echo on 04/09/19, compared to the report of the prior study,   there has been no significant change.   Normal left ventricular systolic function.  The left ventricular ejection fraction is visually estimated to be 60%.  Mild mitral regurgitation.  Mild tricuspid regurgitation.  Estimated right ventricular systolic pressure is 30 mmHg.         Conclusions per Dr. Yobany Gray Op Note 3/8/2022:  Procedure(s) Performed:   Impression:  1. Atrial fibrillation, persistent  2. Successful isolation of all four pulmonary veins  3. Successful posterior wall isolation, and focal CFAE ablation for additional treatment of atrial fibrillation  4. Typical RA flutter and atypical LA flutter, microreentry  5. Successful CTI ablation with bidirectional block for RA flutter  6. Successful ablation of septal LA atypical flutter    Assessment & Plan     1. Paroxysmal atrial fibrillation (HCC)  EKG   2. PVC (premature ventricular contraction)     3. Premature atrial contraction     4. Essential hypertension     5. Long term (current) use of anticoagulants         Medical Decision Making: Today's Assessment/Status/Plan:   PAC's/PVC's/PAF:  - S/P successful AF, typical and atypical flutter ablation on 3/8/2022 with Dr. Gray.  - Reviewed  Kardia mobile tracings, some tracings appear to show atrial bigeminy and others do appear to show AF, some are artifact.   - EKG today shows sinus bradycardia.  - Thorough discussion with patient that due to post ablation irritation some recurrence of AF can be expected and does not mean that his ablation was not successful. We discussed that him being back in normal rhythm this am is reassuring. I encouraged patient to continue to monitor his rhythm with his home Kardia mobile monitor. Patient will send me a XDN[employer] message should he have more episodes and call our office if he converts into AF for long periods of time.   - Will continue the metoprolol 25 mg BID.  - OAC with Eliquis 5 mg BID. Patient does have a dentist appointment approximately 6 weeks post ablation requiring two day of OAC interruption, I will confirm with Dr. Gray that 6 weeks will not be too early post ablation to interrupt OAC use.     HTN:  - Well controlled.   - Continue valsartan 80 mg daily.     Patient will follow up with Dr. Gray as scheduled below or earlier if needed. Encouraged patient to contact our office should any questions or concerns arise in the mean time.      Future Appointments   Date Time Provider Department Center   6/20/2022  9:20 AM Yobany Gray M.D. [facility] None   7/7/2022 11:30 AM Erin Madden M.D. [facility]

## 2022-03-09 NOTE — PROGRESS NOTES
Seen in afternoon same day discharge EP rounds.  S/P successful A-Fib, typical and atypical atrial flutter ablation.      Conclusions per Dr. Yobany Gray Op Note 3/8/2022:  Procedure(s) Performed:   Impression:  1. Atrial fibrillation, persistent  2. Successful isolation of all four pulmonary veins  3. Successful posterior wall isolation, and focal CFAE ablation for additional treatment of atrial fibrillation  4. Typical RA flutter and atypical LA flutter, microreentry  5. Successful CTI ablation with bidirectional block for RA flutter  6. Successful ablation of septal LA atypical flutter     Monitored rhythm NSR.  Vital signs are stable. Bilateral femoral access sites are soft with CDI dressing in place.      I have verified that new discharge prescriptions have been sent to correct pharmacy and provided education about importance of esophageal prophylaxis post ablation.      Discharge instructions discussed with patient:  [facility] Post Ablation Patient Instructions:  1. No lifting > 10 lbs x 1 week.    2. No soaking in baths, hot tubs, pools x 1 week.  May shower the day after discharge and take off groin dressings and leave uncovered.  Continue to monitor sites daily for warmth, redness, discolored drainage.  It is common to have a small lump in the area where the cather was (usually the size of a small marble); this will go away but takes approximately 6 weeks to normalize.   3.   Please take all medications as prescribed to you; please do not stop any medications prescribed post ablation unless directed by your healthcare provider.    4.   Please do not miss any doses of your blood thinner (if you have been started on, or take chronic blood thinners) without discussion with your healthcare provider first.   5.   Please walk and take deep breaths after discharge.  After discharge, if  experiences neurological changes/signs of stroke, high fever, you should be seen in the  emergency dept.   6.   It is possible you may experience some chest discomfort or chest tightness post ablation.  This is usually secondary to inflammation and irritation of the tissues at the area of the ablation.  If this occurs, it is advised to try 400 mg of Ibuprofen with food as needed up to three times a day for a maximum of two days.  This should help to decrease pain and tissue inflammation.          **Please notify the office ([phone]) if this occurs.         ** DO NOT TAKE Ibuprofen IF HISTORY OF SIGNIFICANT BLEEDING OR KIDNEY DISEASE WITHOUT DISCUSSING WITH YOUR CARDIOLOGY PROVIDER FIRST.          ** If pain becomes severe or you have additional symptoms you may need to be medically evaluated; please contact the cardiology office ([phone]) for further direction.   7. It is possible that you may experience arrhythmia/Atrial Fibrillation post ablation.  This is secondary to irritation and inflammation of the cardiac tissues from the ablation.  If you have atrial fibrillation all day or feel poorly with it, please notify your cardiologist's via phone ([phone]) or [employer].    8.  Please contact call our office ([phone]) or message via [employer] message if you have any questions or concerns post procedurally.  9. You need to be seen for post ablation follow up 2-4 weeks post procedure.  If you do not have a follow up appointment scheduled, please call [phone] to schedule your follow up appointment.

## 2022-03-09 NOTE — DISCHARGE INSTRUCTIONS
[facility] V[facility] Post Ablation Patient Instructions:  1. No lifting > 10 lbs x 1 week.      2. No soaking in baths, hot tubs, pools x 1 week.  May shower the day after discharge and take off groin dressings and leave  sites uncovered.  Continue to monitor sites daily for warmth, redness, discolored drainage.  It is common to have a small lump in the area where the cather was (usually the size of a marble); this will go away but takes approximately 6 weeks to normalize.     3.   Please take all medications as prescribed to you; please do not stop any medications prescribed post ablation unless directed by your healthcare provider.      4.   Please do not miss any doses of your blood thinner (if you have been started on, or take chronic blood thinners) without discussion with your healthcare provider first.     5.   Please walk and take deep breaths after discharge.  After discharge, if you experience neurological changes/signs of stroke or high fever you should be seen in the emergency dept.     6.   It is possible you may experience some chest discomfort or chest tightness post ablation.  This is usually secondary to inflammation and irritation of the tissues at the area of the ablation.  If this occurs, it is advised to try 400 mg of Ibuprofen with food as needed up to three times a day for a maximum of two days.  This should help to decrease pain and tissue inflammation.          **Please notify the office ([phone]) if this occurs.         ** DO NOT TAKE Ibuprofen IF HISTORY OF ALLERGY, SIGNIFICANT BLEEDING OR KIDNEY DISEASE WITHOUT DISCUSSING WITH YOUR CARDIOLOGY PROVIDER FIRST.          ** If pain becomes severe or you have additional symptoms you may need to be medically evaluated; please contact the cardiology office ([phone]) for further direction.     7. It is possible that you may experience arrhythmia/Atrial Fibrillation post ablation.  This is secondary to  irritation and inflammation of the cardiac tissues from the ablation.  If you have atrial fibrillation all day or feel poorly with it, please notify your cardiologist's via phone ([phone]) or mychart.      8.  Please contact call our office ([phone]) or message via [employer] message if you have any questions or concerns post procedurally.    9. You need to be seen for post ablation follow up 3-4 weeks post procedure. An appointment is scheduled for you.  Please contact the office ([phone]) if you need to change your appointment.      ACTIVITY: Rest and take it easy for the first 24 hours.  A responsible adult is recommended to remain with you during that time.  It is normal to feel sleepy.  We encourage you to not do anything that requires balance, judgment or coordination.    MILD FLU-LIKE SYMPTOMS ARE NORMAL. YOU MAY EXPERIENCE GENERALIZED MUSCLE ACHES, THROAT IRRITATION, HEADACHE AND/OR SOME NAUSEA.    FOR 24 HOURS DO NOT:  Drive, operate machinery or run household appliances.  Drink beer or alcoholic beverages.   Make important decisions or sign legal documents.    SPECIAL INSTRUCTIONS:     DIET: To avoid nausea, slowly advance diet as tolerated, avoiding spicy or greasy foods for the first day.  Add more substantial food to your diet according to your physician's instructions.  Babies can be fed formula or breast milk as soon as they are hungry.  INCREASE FLUIDS AND FIBER TO AVOID CONSTIPATION.    SURGICAL DRESSING/BATHING: Ok to remove dressings in 24 hours and shower. Do not submerge site in water for one week.    FOLLOW-UP APPOINTMENT:  A follow-up appointment should be arranged with your cardiologist; call to schedule.    You should CALL YOUR PHYSICIAN if you develop:  Fever greater than 101 degrees F.  Pain not relieved by medication, or persistent nausea or vomiting.  Excessive bleeding (blood soaking through dressing) or unexpected drainage from the wound.  Extreme redness or swelling around  the incision site, drainage of pus or foul smelling drainage.  Inability to urinate or empty your bladder within 8 hours.  Problems with breathing or chest pain.    You should call 911 if you develop problems with breathing or chest pain.  If you are unable to contact your doctor or surgical center, you should go to the nearest emergency room or urgent care center.  Physician's telephone #: Dr. Gray, [phone]    If any questions arise, call your doctor.  If your doctor is not available, please feel free to call the Surgical Center at [phone].     A registered nurse may call you a few days after your surgery to see how you are doing after your procedure.    MEDICATIONS: Resume taking daily medication.  Take prescribed pain medication with food.  If no medication is prescribed, you may take non-aspirin pain medication if needed.  PAIN MEDICATION CAN BE VERY CONSTIPATING.  Take a stool softener or laxative such as senokot, pericolace, or milk of magnesia if needed.    Prescription given for prilosec.      If your physician has prescribed pain medication that includes Acetaminophen (Tylenol), do not take additional Acetaminophen (Tylenol) while taking the prescribed medication.    Depression / Suicide Risk    As you are discharged from this [facility] facility, it is important to learn how to keep safe from harming yourself.    Recognize the warning signs:  · Abrupt changes in personality, positive or negative- including increase in energy   · Giving away possessions  · Change in eating patterns- significant weight changes-  positive or negative  · Change in sleeping patterns- unable to sleep or sleeping all the time   · Unwillingness or inability to communicate  · Depression  · Unusual sadness, discouragement and loneliness  · Talk of wanting to die  · Neglect of personal appearance   · Rebelliousness- reckless behavior  · Withdrawal from people/activities they love  · Confusion- inability to  concentrate     If you or a loved one observes any of these behaviors or has concerns about self-harm, here's what you can do:  · Talk about it- your feelings and reasons for harming yourself  · Remove any means that you might use to hurt yourself (examples: pills, rope, extension cords, firearm)  · Get professional help from the community (Mental Health, Substance Abuse, psychological counseling)  · Do not be alone:Call your Safe Contact- someone whom you trust who will be there for you.  · Call your local CRISIS HOTLINE 846-9247 or 701-215-4235  · Call your local Children's Mobile Crisis Response Team [place] (966) 239-7732 or www.[employer]  · Call the toll free National Suicide Prevention Hotlines   · National Suicide Prevention Lifeline 637-638-MDVS (4670)  · National Hope Line Network 800-SUICIDE (418-2388)

## 2022-03-25 ENCOUNTER — OFFICE VISIT (OUTPATIENT)
Dept: NEUROLOGY | Facility: MEDICAL CENTER | Age: 72
End: 2022-03-25
Attending: PSYCHIATRY & NEUROLOGY
Payer: MEDICARE

## 2022-03-25 VITALS
BODY MASS INDEX: 28.11 KG/M2 | HEIGHT: 77 IN | DIASTOLIC BLOOD PRESSURE: 60 MMHG | TEMPERATURE: 96.4 F | OXYGEN SATURATION: 99 % | SYSTOLIC BLOOD PRESSURE: 126 MMHG | WEIGHT: 238.1 LBS | HEART RATE: 50 BPM

## 2022-03-25 DIAGNOSIS — G20.A1 PARKINSON'S DISEASE (HCC): Primary | ICD-10-CM

## 2022-03-25 PROCEDURE — 99214 OFFICE O/P EST MOD 30 MIN: CPT | Performed by: PSYCHIATRY & NEUROLOGY

## 2022-03-25 PROCEDURE — 99212 OFFICE O/P EST SF 10 MIN: CPT | Performed by: PSYCHIATRY & NEUROLOGY

## 2022-03-25 RX ORDER — RASAGILINE 1 MG/1
1 TABLET ORAL
Qty: 90 TABLET | Refills: 3 | Status: SHIPPED | OUTPATIENT
Start: 2022-03-25 | End: 2023-03-30

## 2022-03-25 ASSESSMENT — ENCOUNTER SYMPTOMS
MEMORY LOSS: 0
LOSS OF CONSCIOUSNESS: 0
FALLS: 0
TREMORS: 1
HALLUCINATIONS: 0
INSOMNIA: 0
CONSTIPATION: 1

## 2022-03-25 ASSESSMENT — FIBROSIS 4 INDEX: FIB4 SCORE: 0.98

## 2022-03-25 NOTE — PROGRESS NOTES
Subjective     Gopal Mallory Jr. is a 71 y.o. male who presents for follow-up, with a history of mild, left-sided predominant, Parkinson's disease.    HPI    Since last seen, Gopal has been maintaining his rasagiline and Sinemet 25/100 daily dosing regimen.  He takes his first dose of the latter in the morning at about 7 AM with the rasagiline, he does well throughout the day, but notes that if he has social activities in the evening, he takes his second dose of Sinemet at 4 PM.  Otherwise he can take the second dose a little bit before dinnertime or now at bedtime since it also allows him to sleep better.  The tremor with the left hand can sometimes recur at night, awakening him, if he takes the evening Sinemet too early.    The medication I will he does have an effect of benefit, he notes that it takes about an hour or so to begin to work.  He denies wearing-off otherwise except from those days when he needs the additional dose!  He denies dystonia, dyskinesias, fluctuations, and as he walks he does not freeze.  On/off episodes are not an issue.    He denies any stiffness or tremulousness with the right upper extremity.  Handwriting is small but stable.  Voice volume and clarity maintained.  Cognition has never been an issue.  There is no drowsiness from the Sinemet, dizziness, nausea, hallucinations, etc.    He denies issues with early satiety, constipation is an issue for him.  He can use laxatives, on rare occasion it takes every 3 days.  He has had no problems with urinary retention, orthostatic syncope, etc.    Medical, surgical and family histories are reviewed, there are no new drug allergies.  He just underwent a final ablation for his PAF, with success.  He is happy to get back out on the links, he wants to see what his handicap is now while he is on medicine.    He is on Sinemet 25/100, twice daily, rasagiline 1 mg daily, still on Eliquis 5 mg twice daily, Prilosec, Lopressor 25 mg, twice  "daily, Diovan, zinc plus, vitamin B12, and several topical steroids.    Review of Systems   Constitutional: Negative for malaise/fatigue.   Gastrointestinal: Positive for constipation.   Musculoskeletal: Negative for falls.   Neurological: Positive for tremors. Negative for loss of consciousness.   Psychiatric/Behavioral: Negative for hallucinations and memory loss. The patient does not have insomnia.    All other systems reviewed and are negative.    Objective     /60 (BP Location: Right arm, Patient Position: Sitting, BP Cuff Size: Adult)   Pulse (!) 50   Temp (!) 35.8 °C (96.4 °F) (Temporal)   Ht 1.956 m (6' 5\")   Wt 108 kg (238 lb 1.6 oz)   SpO2 99%   BMI 28.23 kg/m²      Physical Exam    He appears in no acute distress.  His vital signs are stable, his rhythm is regular though his pulse is 50.  There is no malar rash or sialorrhea.  His neck is supple, range of motion is full.  Cardiac evaluation is unremarkable.     Neurological Exam    Fully oriented, there is no aphasia, apraxia, or inattention.    PERRLA/EOMI, eye blink frequency is diminished, visual fields are full, facial movements are symmetric.  There is mild bradykinesia.  Voice volume is slightly diminished only, he enunciates clearly, with little effort he can be heard easily.  There is no bulbar dysfunction.  Shoulder shrug and head rotation are normal.  Sensory exam is intact to light touch.    Musculoskeletal exam reveals only minimal tremor with the left hand.  Rigidity is mild on the left side only.  There is mild generalized bradykinesia.  There is no drift or asterixis.  Strength is intact in all 4 extremities.    He stands easily and with his arms folded, there is only slight hesitancy on gait initiation, stride length with the left leg is easily maintained and symmetric with the right.  The tremor with the left hand reemerges slightly, armswing on the left only minimally diminished.  There is no appendicular dystaxia.  " Repetitive movements with both hands show diminished amplitudes.  Movements are better maintained and symmetric in the lower extremities.      Sensory exam is grossly intact.    Assessment & Plan     1. Parkinson's disease (HCC)  He is doing well, there is no reason to change his regimen though I would anticipate he will need a third dose eventually since there is also very noticeable wearing off towards the end of the afternoons.  For now we can maintain the rasagiline as well.  He has a very mild form of disease, Olimpia and Yahr, grade 2.  I would anticipate his remaining at this level of disease for a while longer.  He has minimal constipation, he is adapting appropriately.  He was encouraged to remain physically active, hopefully his handicap on the golf course has not tanked.  We can follow-up in 1 year.    Time: 30 minutes in total spent in patient care including precharting, record review, discussions with healthcare staff and documentation.  This includes face-to-face time for exam, review, discussion, as well as counseling and coordinating care.   - carbidopa-levodopa (SINEMET)  MG Tab; TAKE 1 TABLET BY MOUTH TWICE A DAY  Dispense: 180 Tablet; Refill: 3  - rasagiline (AZILECT) 1 MG Tab; Take 1 Tablet by mouth every day.  Dispense: 90 Tablet; Refill: 3

## 2022-03-29 ENCOUNTER — OFFICE VISIT (OUTPATIENT)
Dept: CARDIOLOGY | Facility: MEDICAL CENTER | Age: 72
End: 2022-03-29
Payer: MEDICARE

## 2022-03-29 VITALS
OXYGEN SATURATION: 97 % | HEART RATE: 64 BPM | HEIGHT: 77 IN | RESPIRATION RATE: 16 BRPM | DIASTOLIC BLOOD PRESSURE: 80 MMHG | WEIGHT: 232 LBS | BODY MASS INDEX: 27.39 KG/M2 | SYSTOLIC BLOOD PRESSURE: 118 MMHG

## 2022-03-29 DIAGNOSIS — Z79.01 LONG TERM (CURRENT) USE OF ANTICOAGULANTS: ICD-10-CM

## 2022-03-29 DIAGNOSIS — I48.0 PAROXYSMAL ATRIAL FIBRILLATION (HCC): ICD-10-CM

## 2022-03-29 DIAGNOSIS — I49.3 PVC (PREMATURE VENTRICULAR CONTRACTION): ICD-10-CM

## 2022-03-29 DIAGNOSIS — I10 ESSENTIAL HYPERTENSION: ICD-10-CM

## 2022-03-29 DIAGNOSIS — I49.1 PREMATURE ATRIAL CONTRACTION: ICD-10-CM

## 2022-03-29 PROCEDURE — 99214 OFFICE O/P EST MOD 30 MIN: CPT | Performed by: NURSE PRACTITIONER

## 2022-03-29 ASSESSMENT — ENCOUNTER SYMPTOMS
SHORTNESS OF BREATH: 1
PALPITATIONS: 0
CLAUDICATION: 0
ORTHOPNEA: 0
WEAKNESS: 0
WEIGHT LOSS: 0
DIZZINESS: 0
PND: 0

## 2022-03-29 ASSESSMENT — FIBROSIS 4 INDEX: FIB4 SCORE: 0.98

## 2022-03-31 LAB — EKG IMPRESSION: NORMAL

## 2022-03-31 PROCEDURE — 93000 ELECTROCARDIOGRAM COMPLETE: CPT | Performed by: INTERNAL MEDICINE

## 2022-03-31 NOTE — PATIENT COMMUNICATION
Yobany Gray M.D.  SOPHIA Maria 2 days ago         Yes OK to hold    Message text      SOPHIA Maria M.D. 2 days ago       Hi Dr. Gray,     Patient was in [place] this am. Kardia mobile readings showed some irregular tracings that could be AF. Discussed post ablation irritation and healing expectations.     Patient has a dentist apt on 4/22/22, needs to hold OAC 2 days prior. Will be about 6 weeks post ablation. Okay to hold?     Thanks,     JS

## 2022-05-25 ENCOUNTER — PATIENT MESSAGE (OUTPATIENT)
Dept: MEDICAL GROUP | Facility: MEDICAL CENTER | Age: 72
End: 2022-05-25
Payer: MEDICARE

## 2022-05-25 DIAGNOSIS — G89.29 CHRONIC PAIN OF LEFT KNEE: ICD-10-CM

## 2022-05-25 DIAGNOSIS — M25.562 CHRONIC PAIN OF LEFT KNEE: ICD-10-CM

## 2022-05-26 NOTE — PROGRESS NOTES
Patient has history of left knee injury, requesting referral to orthopedic specialist.  He would like to see Dr. Cotter.

## 2022-06-20 ENCOUNTER — OFFICE VISIT (OUTPATIENT)
Dept: CARDIOLOGY | Facility: MEDICAL CENTER | Age: 72
End: 2022-06-20
Payer: MEDICARE

## 2022-06-20 VITALS
DIASTOLIC BLOOD PRESSURE: 68 MMHG | HEART RATE: 49 BPM | HEIGHT: 77 IN | BODY MASS INDEX: 28.1 KG/M2 | RESPIRATION RATE: 14 BRPM | SYSTOLIC BLOOD PRESSURE: 124 MMHG | OXYGEN SATURATION: 97 % | WEIGHT: 238 LBS

## 2022-06-20 DIAGNOSIS — I48.0 PAROXYSMAL ATRIAL FIBRILLATION (HCC): ICD-10-CM

## 2022-06-20 PROCEDURE — 99214 OFFICE O/P EST MOD 30 MIN: CPT | Mod: 25 | Performed by: INTERNAL MEDICINE

## 2022-06-20 PROCEDURE — 93000 ELECTROCARDIOGRAM COMPLETE: CPT | Performed by: INTERNAL MEDICINE

## 2022-06-20 RX ORDER — CHLORHEXIDINE GLUCONATE ORAL RINSE 1.2 MG/ML
SOLUTION DENTAL
COMMUNITY
Start: 2022-05-03 | End: 2022-06-20

## 2022-06-20 RX ORDER — AMOXICILLIN 500 MG/1
CAPSULE ORAL
COMMUNITY
Start: 2022-04-22 | End: 2022-06-20

## 2022-06-20 ASSESSMENT — FIBROSIS 4 INDEX: FIB4 SCORE: 0.98

## 2022-06-20 NOTE — PROGRESS NOTES
"Arrhythmia Clinic Note (Established patient)    DOS: 6/20/2022    Chief complaint/Reason for consult: Afib    Interval History: 70 y/o M with persistent Afib s/p ablation. Had some possible ERAF a few weeks after ablation but none since. Feeling well, has some intermittent fatigue on exertion.    ROS (+ highlighted in bold):  Constitutional: Fevers/chills/fatigue/weightloss  HEENT: Blurry vision/eye pain/sore throat/hearing loss  Respiratory: Shortness of breath/cough  Cardiovascular: Chest pain/palpitations/edema/orthopnea/syncope  GI: Nausea/vomitting/diarrhea  MSK: Arthralgias/myagias/muscle weakness  Skin: Rash/sores  Neurological: Numbness/tremors/vertigo  Endocrine: Excessive thirst/polyuria/cold intolerance/heat intolerance  Psych: Depression/anxiety    Past Medical History:   Diagnosis Date   • Arrhythmia     Afib   • Arthritis 03/04/2022    Knuckles and knees.   • Bowel habit changes 03/04/2022    Constipation   • Cancer (HCC)     Basal cell/ Squamous skin   • Dental disorder 03/04/2022    Waiting for implant front top tooth.  Tooth pulled a couple of months ago.   • GERD (gastroesophageal reflux disease)    • Hypertension    • Parkinson disease (HCC)    • Rash 03/04/2022    \"Jock Rash\"   • Snoring     Negative sleep study.       Past Surgical History:   Procedure Laterality Date   • DENTAL EXTRACTION(S)      wisdom teeth removed at age 18   • OTHER      mohs to nose, back       Social History     Socioeconomic History   • Marital status:      Spouse name: Not on file   • Number of children: Not on file   • Years of education: Not on file   • Highest education level: Bachelor's degree (e.g., BA, AB, BS)   Occupational History   • Not on file   Tobacco Use   • Smoking status: Never Smoker   • Smokeless tobacco: Never Used   Vaping Use   • Vaping Use: Never used   Substance and Sexual Activity   • Alcohol use: Yes     Alcohol/week: 1.2 oz     Types: 2 Glasses of wine per week     Comment: 2 glasses " wine/night   • Drug use: No   • Sexual activity: Yes     Partners: Female   Other Topics Concern   • Not on file   Social History Narrative   • Not on file     Social Determinants of Health     Financial Resource Strain: Low Risk    • Difficulty of Paying Living Expenses: Not hard at all   Food Insecurity: No Food Insecurity   • Worried About Running Out of Food in the Last Year: Never true   • Ran Out of Food in the Last Year: Never true   Transportation Needs: No Transportation Needs   • Lack of Transportation (Medical): No   • Lack of Transportation (Non-Medical): No   Physical Activity: Sufficiently Active   • Days of Exercise per Week: 4 days   • Minutes of Exercise per Session: 50 min   Stress: No Stress Concern Present   • Feeling of Stress : Only a little   Social Connections: Moderately Integrated   • Frequency of Communication with Friends and Family: More than three times a week   • Frequency of Social Gatherings with Friends and Family: More than three times a week   • Attends Latter-day Services: Never   • Active Member of Clubs or Organizations: Yes   • Attends Club or Organization Meetings: More than 4 times per year   • Marital Status:    Intimate Partner Violence: Not on file   Housing Stability: Low Risk    • Unable to Pay for Housing in the Last Year: No   • Number of Places Lived in the Last Year: 1   • Unstable Housing in the Last Year: No       Family History   Problem Relation Age of Onset   • Cancer Mother         bladder non-smoker   • Dementia Father    • Cancer Brother         prostate age 52       Allergies   Allergen Reactions   • Molds & Smuts Unspecified     Sneezing       Current Outpatient Medications   Medication Sig Dispense Refill   • valsartan (DIOVAN) 80 MG Tab TAKE 1 TABLET BY MOUTH EVERY DAY 90 Tablet 3   • carbidopa-levodopa (SINEMET)  MG Tab TAKE 1 TABLET BY MOUTH TWICE A  Tablet 3   • rasagiline (AZILECT) 1 MG Tab Take 1 Tablet by mouth every day. 90  "Tablet 3   • omeprazole (PRILOSEC) 20 MG delayed-release capsule Take 1 Capsule by mouth every day. Take every morning for 30 days post ablation to protect your esophagus. 30 Capsule 0   • fluorouracil (EFUDEX) 5 % cream AAA face BID X 2-4 weeks. Forehead, temples, cheeks and nose. Stop when red, crusted, blistered X 3-5 days 40 g 0   • apixaban (ELIQUIS) 5mg Tab TAKE 1 TABLET BY MOUTH TWICE A DAY (Patient taking differently: Take 5 mg by mouth 2 times a day. TAKE 1 TABLET BY MOUTH TWICE A DAY) 180 Tablet 3   • Cyanocobalamin (B-12 PO) Take 1 Tablet by mouth every day.     • triamcinolone acetonide (KENALOG) 0.1 % Cream AAA abdomen BID PRN rash.  Avoid use on sensitive sites.  If use on face, nose; limit to sparing amount 2X/week 60 g 1   • fluticasone (FLONASE) 50 MCG/ACT nasal spray Spray 1 Spray in nose 1 time daily as needed (ALLERGY).       No current facility-administered medications for this visit.       Physical Exam:  Vitals:    06/20/22 0921   BP: 124/68   BP Location: Left arm   Patient Position: Sitting   BP Cuff Size: Adult   Pulse: (!) 49   Resp: 14   SpO2: 97%   Weight: 108 kg (238 lb)   Height: 1.956 m (6' 5\")     General appearance: NAD, conversant   Eyes: anicteric sclerae, moist conjunctivae; no lid-lag; PERRLA  HENT: Atraumatic; oropharynx clear with moist mucous membranes and no mucosal ulcerations; normal hard and soft palate  Neck: Trachea midline; FROM, supple, no thyromegaly or lymphadenopathy  Lungs: CTA, with normal respiratory effort and no intercostal retractions  CV: RRR, no MRGs, no JVD  Abdomen: Soft, non-tender; no masses or HSM  Extremities: No peripheral edema or extremity lymphadenopathy  Skin: Normal temperature, turgor and texture; no rash, ulcers or subcutaneous nodules  Psych: Appropriate affect, alert and oriented to person, place and time    Data:  Lipids:   Lab Results   Component Value Date/Time    CHOLSTRLTOT 151 01/03/2022 07:53 AM    TRIGLYCERIDE 118 01/03/2022 07:53 AM "    HDL 42 01/03/2022 07:53 AM    LDL 85 01/03/2022 07:53 AM        BMP:  Lab Results   Component Value Date/Time    SODIUM 140 03/04/2022 1058    POTASSIUM 4.7 03/04/2022 1058    CHLORIDE 103 03/04/2022 1058    CO2 29 03/04/2022 1058    GLUCOSE 102 (H) 03/04/2022 1058    BUN 17 03/04/2022 1058    CREATININE 1.04 03/04/2022 1058    CALCIUM 9.2 03/04/2022 1058    ANION 8.0 03/04/2022 1058        TSH:   Lab Results   Component Value Date/Time    TSHULTRASEN 3.900 10/02/2019 0800        THYROXINE (T4):   No results found for: ASHLEYIR     CBC:   Lab Results   Component Value Date/Time    WBC 5.9 03/04/2022 10:58 AM    RBC 5.19 03/04/2022 10:58 AM    HEMOGLOBIN 15.5 03/04/2022 10:58 AM    HEMATOCRIT 45.9 03/04/2022 10:58 AM    MCV 88.4 03/04/2022 10:58 AM    MCH 29.9 03/04/2022 10:58 AM    MCHC 33.8 03/04/2022 10:58 AM    RDW 40.0 03/04/2022 10:58 AM    PLATELETCT 196 03/04/2022 10:58 AM    MPV 9.5 03/04/2022 10:58 AM    NEUTSPOLYS 52.60 03/04/2022 10:58 AM    LYMPHOCYTES 37.10 03/04/2022 10:58 AM    MONOCYTES 8.70 03/04/2022 10:58 AM    EOSINOPHILS 0.90 03/04/2022 10:58 AM    BASOPHILS 0.50 03/04/2022 10:58 AM    IMMGRAN 0.20 03/04/2022 10:58 AM    NRBC 0.00 03/04/2022 10:58 AM    NEUTS 3.10 03/04/2022 10:58 AM    LYMPHS 2.18 03/04/2022 10:58 AM    MONOS 0.51 03/04/2022 10:58 AM    EOS 0.05 03/04/2022 10:58 AM    BASO 0.03 03/04/2022 10:58 AM    IMMGRANAB 0.01 03/04/2022 10:58 AM    NRBCAB 0.00 03/04/2022 10:58 AM        CBC w/o DIFF  Lab Results   Component Value Date/Time    WBC 5.9 03/04/2022 10:58 AM    RBC 5.19 03/04/2022 10:58 AM    HEMOGLOBIN 15.5 03/04/2022 10:58 AM    MCV 88.4 03/04/2022 10:58 AM    MCH 29.9 03/04/2022 10:58 AM    MCHC 33.8 03/04/2022 10:58 AM    RDW 40.0 03/04/2022 10:58 AM    MPV 9.5 03/04/2022 10:58 AM       Prior echo/stress reviewed: No CARIDAD thrombus    EKG interpreted by me: NSR    Impression/Plan:  1. Paroxysmal atrial fibrillation (HCC)  EKG     1. Afib s/p ablation  2. Sinus  bradycardia  3. Anticoagulation management, Eliquis    - Stop metoprolol due to bradycardia  - Offered repeat monitor if bradycardia symptomatic and does not resolve off metoprolol  - Staying in NSR for now    FV 6 months      Yobany Gray MD  Cardiac Electrophysiology

## 2022-07-01 LAB — EKG IMPRESSION: NORMAL

## 2022-07-07 ENCOUNTER — TELEPHONE (OUTPATIENT)
Dept: MEDICAL GROUP | Facility: MEDICAL CENTER | Age: 72
End: 2022-07-07

## 2022-07-07 NOTE — TELEPHONE ENCOUNTER
Phone Number Called: [phone] (home)       Call outcome: Spoke to patient regarding message below.    Message: Spoke with pt he forgot about this appt re arian 07/26 THR

## 2022-07-26 ENCOUNTER — APPOINTMENT (OUTPATIENT)
Dept: MEDICAL GROUP | Facility: MEDICAL CENTER | Age: 72
End: 2022-07-26
Payer: MEDICARE

## 2022-07-26 ENCOUNTER — OFFICE VISIT (OUTPATIENT)
Dept: MEDICAL GROUP | Facility: MEDICAL CENTER | Age: 72
End: 2022-07-26

## 2022-07-26 VITALS
OXYGEN SATURATION: 95 % | DIASTOLIC BLOOD PRESSURE: 74 MMHG | TEMPERATURE: 98.3 F | BODY MASS INDEX: 27.59 KG/M2 | WEIGHT: 233.69 LBS | SYSTOLIC BLOOD PRESSURE: 114 MMHG | HEIGHT: 77 IN | HEART RATE: 65 BPM

## 2022-07-26 DIAGNOSIS — I48.0 PAROXYSMAL ATRIAL FIBRILLATION (HCC): ICD-10-CM

## 2022-07-26 DIAGNOSIS — Z12.5 SCREENING FOR MALIGNANT NEOPLASM OF PROSTATE: ICD-10-CM

## 2022-07-26 DIAGNOSIS — I10 ESSENTIAL HYPERTENSION: ICD-10-CM

## 2022-07-26 DIAGNOSIS — K21.9 GASTROESOPHAGEAL REFLUX DISEASE WITHOUT ESOPHAGITIS: ICD-10-CM

## 2022-07-26 PROCEDURE — 99214 OFFICE O/P EST MOD 30 MIN: CPT | Performed by: INTERNAL MEDICINE

## 2022-07-26 RX ORDER — VALSARTAN 80 MG/1
40 TABLET ORAL
Qty: 90 TABLET | Refills: 3 | Status: SHIPPED | OUTPATIENT
Start: 2022-07-26 | End: 2023-05-19 | Stop reason: SDUPTHER

## 2022-07-26 ASSESSMENT — ENCOUNTER SYMPTOMS
SHORTNESS OF BREATH: 0
HEADACHES: 0
CHILLS: 0
TREMORS: 1
VOMITING: 0
DIZZINESS: 0
FEVER: 0
COUGH: 0
DEPRESSION: 0
NAUSEA: 0

## 2022-07-26 ASSESSMENT — FIBROSIS 4 INDEX: FIB4 SCORE: 0.98

## 2022-07-26 NOTE — PROGRESS NOTES
"Subjective:     Chief Complaint   Patient presents with   • Follow-Up     Diagnoses of Paroxysmal atrial fibrillation (HCC), Essential hypertension, Screening for malignant neoplasm of prostate, and Gastroesophageal reflux disease without esophagitis were pertinent to this visit.    HPI: Gopal is a pleasant 71 y.o. male who presents today for routine 6-month follow-up.    Problem   Gastroesophageal Reflux Disease Without Esophagitis    Barium swallow 8/12/2019 did show signs of GERD without stricture or mass.  Per chart review no EGD was recommended.  Believes he had EGD completed around 2 to 3 years ago.     Paroxysmal Atrial Fibrillation (Hcc)    This is a chronic condition, currently managed by cardiology Dr. Yobany Gray.  Echo 4/9/19 normal ejection fraction 60%, mild LVH, mild RV enlargement with no significant tricuspid regurg, normal left atrium, trace MR.   Status post ablation 3 months ago.     Essential Hypertension    Chronic, controlled on current regimen of valsartan 40 mg daily. Patient has no anginal or strokelike symptoms.  Advised low-salt diet and exercise.       Past Medical History:   Diagnosis Date   • Arrhythmia     Afib   • Arthritis 03/04/2022    Knuckles and knees.   • Bowel habit changes 03/04/2022    Constipation   • Cancer (HCC)     Basal cell/ Squamous skin   • Dental disorder 03/04/2022    Waiting for implant front top tooth.  Tooth pulled a couple of months ago.   • GERD (gastroesophageal reflux disease)    • Hypertension    • Parkinson disease (HCC)    • Rash 03/04/2022    \"Jock Rash\"   • Snoring     Negative sleep study.       Current Outpatient Medications Ordered in Epic   Medication Sig Dispense Refill   • valsartan (DIOVAN) 80 MG Tab Take 0.5 Tablets by mouth every day. 90 Tablet 3   • carbidopa-levodopa (SINEMET)  MG Tab TAKE 1 TABLET BY MOUTH TWICE A  Tablet 3   • rasagiline (AZILECT) 1 MG Tab Take 1 Tablet by mouth every day. 90 Tablet 3   • omeprazole (PRILOSEC) " "20 MG delayed-release capsule Take 1 Capsule by mouth every day. Take every morning for 30 days post ablation to protect your esophagus. 30 Capsule 0   • fluorouracil (EFUDEX) 5 % cream AAA face BID X 2-4 weeks. Forehead, temples, cheeks and nose. Stop when red, crusted, blistered X 3-5 days 40 g 0   • apixaban (ELIQUIS) 5mg Tab TAKE 1 TABLET BY MOUTH TWICE A DAY (Patient taking differently: Take 5 mg by mouth 2 times a day. TAKE 1 TABLET BY MOUTH TWICE A DAY) 180 Tablet 3   • Cyanocobalamin (B-12 PO) Take 1 Tablet by mouth every day.     • triamcinolone acetonide (KENALOG) 0.1 % Cream AAA abdomen BID PRN rash.  Avoid use on sensitive sites.  If use on face, nose; limit to sparing amount 2X/week 60 g 1   • fluticasone (FLONASE) 50 MCG/ACT nasal spray Spray 1 Spray in nose 1 time daily as needed (ALLERGY).       No current [facility]-ordered facility-administered medications on file.     Health Maintenance: Due for Shingrix and second COVID-19 booster    Review of Systems   Constitutional: Negative for chills and fever.   Respiratory: Negative for cough and shortness of breath.    Cardiovascular: Negative for chest pain.   Gastrointestinal: Negative for nausea and vomiting.   Neurological: Positive for tremors (Parkinson). Negative for dizziness and headaches.   Psychiatric/Behavioral: Negative for depression.     Objective:     Exam:  /74 (BP Location: Left arm, Patient Position: Sitting, BP Cuff Size: Adult)   Pulse 65   Temp 36.8 °C (98.3 °F) (Temporal)   Ht 1.956 m (6' 5\")   Wt 106 kg (233 lb 11 oz)   SpO2 95%   BMI 27.71 kg/m²  Body mass index is 27.71 kg/m².    Physical Exam  Constitutional:       Appearance: Normal appearance. He is normal weight.   HENT:      Head: Normocephalic and atraumatic.      Nose: Nose normal. No congestion.   Eyes:      General: No scleral icterus.  Cardiovascular:      Pulses: Normal pulses.      Heart sounds: Normal heart sounds. No murmur heard.  Pulmonary:      Effort: " "Pulmonary effort is normal.   Skin:     General: Skin is warm and dry.   Neurological:      Mental Status: He is alert.       Labs: CBC, lipid panel and CMP from 1/3/2022    Assessment & Plan:   Gopal  is a pleasant 71 y.o. male with the following -     Problem List Items Addressed This Visit     Essential hypertension (Chronic)     Chronic, controlled on valsartan.  Continue without changes.           Relevant Medications    valsartan (DIOVAN) 80 MG Tab    Gastroesophageal reflux disease without esophagitis (Chronic)     Chronic, controlled on omeprazole 20 mg daily, patient reports intermittent feeling of \" swallowing in the wrong way\".  No obvious dysphagia.    - Referred back to gastroenterology for endoscopy           Relevant Orders    Referral to Gastroenterology    Paroxysmal atrial fibrillation (HCC) (Chronic)     Status post ablation approximately 3 months ago.  Regular rhythm, anticoagulated with Eliquis 5 mg twice daily.           Relevant Medications    valsartan (DIOVAN) 80 MG Tab      Other Visit Diagnoses     Screening for malignant neoplasm of prostate        Relevant Orders    PROSTATE SPECIFIC AG SCREENING        Return in about 6 months (around 1/26/2023) for annual wellness visit.    Please note that this dictation was created using voice recognition software. I have made every reasonable attempt to correct obvious errors, but I expect that there are errors of grammar and possibly content that I did not discover before finalizing the note.      "

## 2022-07-27 NOTE — ASSESSMENT & PLAN NOTE
"Chronic, controlled on omeprazole 20 mg daily, patient reports intermittent feeling of \" swallowing in the wrong way\".  No obvious dysphagia.    - Referred back to gastroenterology for endoscopy  "

## 2022-07-27 NOTE — ASSESSMENT & PLAN NOTE
Status post ablation approximately 3 months ago.  Regular rhythm, anticoagulated with Eliquis 5 mg twice daily.

## 2022-08-17 ENCOUNTER — HOSPITAL ENCOUNTER (OUTPATIENT)
Dept: LAB | Facility: MEDICAL CENTER | Age: 72
End: 2022-08-17
Attending: INTERNAL MEDICINE
Payer: MEDICARE

## 2022-08-17 DIAGNOSIS — Z12.5 SCREENING FOR MALIGNANT NEOPLASM OF PROSTATE: ICD-10-CM

## 2022-08-17 LAB — PSA SERPL-MCNC: 1.43 NG/ML (ref 0–4)

## 2022-08-17 PROCEDURE — 36415 COLL VENOUS BLD VENIPUNCTURE: CPT | Mod: GA

## 2022-08-17 PROCEDURE — 84153 ASSAY OF PSA TOTAL: CPT | Mod: GA

## 2022-08-18 ENCOUNTER — OFFICE VISIT (OUTPATIENT)
Dept: DERMATOLOGY | Facility: IMAGING CENTER | Age: 72
End: 2022-08-18
Payer: MEDICARE

## 2022-08-18 DIAGNOSIS — L81.4 LENTIGO: ICD-10-CM

## 2022-08-18 DIAGNOSIS — L57.0 ACTINIC KERATOSIS: ICD-10-CM

## 2022-08-18 DIAGNOSIS — Z85.828 HX OF NONMELANOMA SKIN CANCER: ICD-10-CM

## 2022-08-18 DIAGNOSIS — L82.1 SEBORRHEIC KERATOSIS: ICD-10-CM

## 2022-08-18 DIAGNOSIS — L11.1 GROVER'S DISEASE: ICD-10-CM

## 2022-08-18 DIAGNOSIS — D22.9 NEVUS: ICD-10-CM

## 2022-08-18 DIAGNOSIS — L90.8 SKIN AGING: ICD-10-CM

## 2022-08-18 DIAGNOSIS — Z12.83 SKIN CANCER SCREENING: ICD-10-CM

## 2022-08-18 PROCEDURE — 99213 OFFICE O/P EST LOW 20 MIN: CPT | Mod: 25 | Performed by: DERMATOLOGY

## 2022-08-18 PROCEDURE — 17004 DESTROY PREMAL LESIONS 15/>: CPT | Performed by: DERMATOLOGY

## 2022-08-18 NOTE — PROGRESS NOTES
"CC: Follow up CLEVE       Subjective: Previously seen patient here for CLEVE.      Pt states spots (Aks) previously frozen before still present. On neck and ear and face    From prior notes:  Left groin - hx of staph and jock itch. Desires eval.     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 No itch.  No cough.  DermPMH: mult past NMSC  No problem-specific Assessment & Plan notes found for this encounter.    Relevant PMH: mult med comorbidities  Social: never smoker    PE: Gen:WDWN male in NAD. Skin: Scalp/face/eyes/lips/neck/chest/back/arms/legs/hands/feet/buttocks - examined.  Genitals exam declined  -thin HK papules on right conchal bowl, scattered on face and few on left arm, one on right wrist. Also gritty, scattered on right temple and nose, cheeks.   -scattered hyperpigmented macules/papules, appearing benign on torso and extremities  -crusted papules scattered on lower abdomen    A/P: AKs: r ear, face - many, left arm X 3, right wrist X1  -counseled on diagnosis and treatment, including risks/benefits/alternatives of cyrotherapy  -LN2 25 sec X 2 cycles X 15+lesions  -f/u if persists at 1 month      AKs: face  -efudex cream BID X 2-4 weeks, se reviewed, to complete face trx before next CLEVE visit      Hx of skin cancer: most recently SCCIS - free margins, right arm, observing. F/u future visit.  Has declined ED&C, excision. NER TODAY  -cont'd sunprotection and skin cancer surveillance  -Q 6mo-annual exam recommended; f/u suspicious lesions PRN  -rec nicotinamide OTC 500mg PO BID    Nevi: benign appearing:  -Reviewed skin cancer " detection/prevention  -RTC PRN growth/changes/concerning features    Lentigos/SKs: benign  -reassurance  -reviewed skin cancer detection/prevention    [place]:  No trx reviewed today    F/u 6 months    I have reviewed medications relevant to my specialty.

## 2022-08-26 ENCOUNTER — PATIENT MESSAGE (OUTPATIENT)
Dept: MEDICAL GROUP | Facility: MEDICAL CENTER | Age: 72
End: 2022-08-26
Payer: MEDICARE

## 2022-08-26 DIAGNOSIS — Z98.890 S/P ABLATION OF ATRIAL FIBRILLATION: ICD-10-CM

## 2022-08-26 DIAGNOSIS — Z86.79 S/P ABLATION OF ATRIAL FIBRILLATION: ICD-10-CM

## 2022-08-29 RX ORDER — OMEPRAZOLE 20 MG/1
20 CAPSULE, DELAYED RELEASE ORAL DAILY
Qty: 30 CAPSULE | Refills: 0 | Status: SHIPPED | OUTPATIENT
Start: 2022-08-29 | End: 2022-09-20

## 2022-08-29 NOTE — PATIENT COMMUNICATION
Received request via: Patient    Was the patient seen in the last year in this department? Yes    Does the patient have an active prescription (recently filled or refills available) for medication(s) requested? No     Requested Prescriptions     Pending Prescriptions Disp Refills    omeprazole (PRILOSEC) 20 MG delayed-release capsule 30 Capsule 0     Sig: Take 1 Capsule by mouth every day. Take every morning for 30 days post ablation to protect your esophagus.

## 2022-09-08 RX ORDER — DOXYCYCLINE HYCLATE 100 MG
100 TABLET ORAL 2 TIMES DAILY
Qty: 20 TABLET | Refills: 0 | Status: SHIPPED | OUTPATIENT
Start: 2022-09-08 | End: 2022-09-28

## 2022-09-28 PROBLEM — M17.12 OSTEOARTHRITIS OF LEFT KNEE: Status: ACTIVE | Noted: 2022-09-28

## 2022-10-05 ENCOUNTER — TELEPHONE (OUTPATIENT)
Dept: CARDIOLOGY | Facility: MEDICAL CENTER | Age: 72
End: 2022-10-05
Payer: MEDICARE

## 2022-10-05 NOTE — TELEPHONE ENCOUNTER
To [facility] - ok to proceed? Ok to hold Eliquis 2 days prior if needed?     Received clearance request from [facility] for total knee replacement.      Last office visit - 6/20/2022     Past Medical History:  1. Afib s/p ablation  2. Sinus bradycardia  3. Anticoagulation management, Eliquis     Return clearance to:  Phone: [phone]  Fax: [phone]

## 2022-10-07 DIAGNOSIS — I48.0 PAROXYSMAL ATRIAL FIBRILLATION (HCC): ICD-10-CM

## 2022-10-13 ENCOUNTER — TELEPHONE (OUTPATIENT)
Dept: CARDIOLOGY | Facility: MEDICAL CENTER | Age: 72
End: 2022-10-13
Payer: MEDICARE

## 2022-10-13 NOTE — TELEPHONE ENCOUNTER
To [facility] - ok to proceed? Ok to hold Eliquis  days prior if needed?     Received clearance request from [facility] for EGD.      Last office visit - 6/20/2022     Past Medical History:  1. Afib s/p ablation  2. Sinus bradycardia  3. Anticoagulation management, Eliquis        Return clearance to:  Phone: [phone]  Fax: [phone]

## 2022-10-27 ENCOUNTER — HOSPITAL ENCOUNTER (OUTPATIENT)
Dept: RADIOLOGY | Facility: MEDICAL CENTER | Age: 72
End: 2022-10-27
Payer: MEDICARE

## 2022-11-08 ENCOUNTER — PATIENT MESSAGE (OUTPATIENT)
Dept: HEALTH INFORMATION MANAGEMENT | Facility: OTHER | Age: 72
End: 2022-11-08

## 2022-12-06 ENCOUNTER — PATIENT MESSAGE (OUTPATIENT)
Dept: CARDIOLOGY | Facility: MEDICAL CENTER | Age: 72
End: 2022-12-06
Payer: MEDICARE

## 2022-12-14 ENCOUNTER — APPOINTMENT (OUTPATIENT)
Dept: RADIOLOGY | Facility: MEDICAL CENTER | Age: 72
End: 2022-12-14
Payer: MEDICARE

## 2022-12-20 ENCOUNTER — OFFICE VISIT (OUTPATIENT)
Dept: CARDIOLOGY | Facility: MEDICAL CENTER | Age: 72
End: 2022-12-20
Payer: MEDICARE

## 2022-12-20 VITALS
WEIGHT: 239 LBS | HEIGHT: 77 IN | OXYGEN SATURATION: 96 % | DIASTOLIC BLOOD PRESSURE: 70 MMHG | SYSTOLIC BLOOD PRESSURE: 130 MMHG | BODY MASS INDEX: 28.22 KG/M2 | HEART RATE: 75 BPM | RESPIRATION RATE: 18 BRPM

## 2022-12-20 DIAGNOSIS — I48.0 PAROXYSMAL ATRIAL FIBRILLATION (HCC): ICD-10-CM

## 2022-12-20 PROCEDURE — 99214 OFFICE O/P EST MOD 30 MIN: CPT | Performed by: INTERNAL MEDICINE

## 2022-12-20 PROCEDURE — 93000 ELECTROCARDIOGRAM COMPLETE: CPT | Performed by: INTERNAL MEDICINE

## 2022-12-20 RX ORDER — CHLORHEXIDINE GLUCONATE ORAL RINSE 1.2 MG/ML
SOLUTION DENTAL
COMMUNITY
Start: 2022-10-17 | End: 2022-12-20

## 2022-12-20 ASSESSMENT — FIBROSIS 4 INDEX: FIB4 SCORE: 0.99

## 2022-12-20 NOTE — PROGRESS NOTES
"Arrhythmia Clinic Note (Established patient)    DOS: 12/20/2022    Chief complaint/Reason for consult: Afib    Interval History: 71 y/o M with persistent Afib s/p ablation without significant recurrence, off AAD/BB and remains on Eliquis. Feeling well. Just had L knee TKA.    ROS (+ highlighted in bold):  Constitutional: Fevers/chills/fatigue/weightloss  HEENT: Blurry vision/eye pain/sore throat/hearing loss  Respiratory: Shortness of breath/cough  Cardiovascular: Chest pain/palpitations/edema/orthopnea/syncope  GI: Nausea/vomitting/diarrhea  MSK: Arthralgias/myagias/muscle weakness  Skin: Rash/sores  Neurological: Numbness/tremors/vertigo  Endocrine: Excessive thirst/polyuria/cold intolerance/heat intolerance  Psych: Depression/anxiety    Past Medical History:   Diagnosis Date    Arrhythmia     Afib    Arthritis 03/04/2022    Knuckles and knees.    Bowel habit changes 03/04/2022    Constipation    Cancer (HCC)     Basal cell/ Squamous skin    Dental disorder 03/04/2022    Waiting for implant front top tooth.  Tooth pulled a couple of months ago.    GERD (gastroesophageal reflux disease)     Hypertension     Parkinson disease (HCC)     Rash 03/04/2022    \"Jock Rash\"    Snoring     Negative sleep study.       Past Surgical History:   Procedure Laterality Date    PB TOTAL KNEE ARTHROPLASTY Left 12/5/2022    Procedure: LEFT TOTAL KNEE ARTHROPLASTY;  Surgeon: Jim Hu M.D.;  Location: [facility];  Service: Orthopedics    DENTAL EXTRACTION(S)      wisdom teeth removed at age 18    OTHER      mohs to nose, back       Social History     Socioeconomic History    Marital status:      Spouse name: Not on file    Number of children: Not on file    Years of education: Not on file    Highest education level: Bachelor's degree (e.g., BA, AB, BS)   Occupational History    Not on file   Tobacco Use    Smoking status: Never    Smokeless tobacco: Never   Vaping Use    Vaping Use: Never used "   Substance and Sexual Activity    Alcohol use: Yes     Alcohol/week: 8.4 oz     Types: 14 Glasses of wine per week     Comment: 2 glasses wine/night    Drug use: No    Sexual activity: Yes     Partners: Female   Other Topics Concern    Not on file   Social History Narrative    Not on file     Social Determinants of Health     Financial Resource Strain: Not on file   Food Insecurity: Not on file   Transportation Needs: Not on file   Physical Activity: Not on file   Stress: Not on file   Social Connections: Not on file   Intimate Partner Violence: Not on file   Housing Stability: Not on file       Family History   Problem Relation Age of Onset    Cancer Mother         bladder non-smoker    Dementia Father     Cancer Brother         prostate age 52       Allergies   Allergen Reactions    Molds & Smuts Unspecified     Sneezing       Current Outpatient Medications   Medication Sig Dispense Refill    oxyCODONE immediate-release (ROXICODONE) 5 MG Tab Take 1 Tablet by mouth every four hours as needed for Severe Pain for up to 7 days. 42 Tablet 0    apixaban (ELIQUIS) 5mg Tab TAKE 1 TABLET BY MOUTH TWICE A  Tablet 2    omeprazole (PRILOSEC) 20 MG delayed-release capsule TAKE 1 CAPSULE BY MOUTH EVERY DAY. TAKE EVERY MORNING FOR 30 DAYS POST ABLATION TO PROTECT YOUR ESOPHAGUS. 90 Capsule 3    valsartan (DIOVAN) 80 MG Tab Take 0.5 Tablets by mouth every day. 90 Tablet 3    carbidopa-levodopa (SINEMET)  MG Tab TAKE 1 TABLET BY MOUTH TWICE A  Tablet 3    rasagiline (AZILECT) 1 MG Tab Take 1 Tablet by mouth every day. 90 Tablet 3    Cyanocobalamin (B-12 PO) Take 1 Tablet by mouth every day.      triamcinolone acetonide (KENALOG) 0.1 % Cream AAA abdomen BID PRN rash.  Avoid use on sensitive sites.  If use on face, nose; limit to sparing amount 2X/week 60 g 1    fluticasone (FLONASE) 50 MCG/ACT nasal spray Spray 1 Spray in nose 1 time daily as needed (ALLERGY).       No current facility-administered medications  "for this visit.       Physical Exam:  Vitals:    12/20/22 0922   BP: 130/70   BP Location: Left arm   Patient Position: Sitting   BP Cuff Size: Adult   Pulse: 75   Resp: 18   SpO2: 96%   Weight: 108 kg (239 lb)   Height: 1.956 m (6' 5\")     General appearance: NAD, conversant   Eyes: anicteric sclerae, moist conjunctivae; no lid-lag; PERRLA  HENT: Atraumatic; oropharynx clear with moist mucous membranes and no mucosal ulcerations; normal hard and soft palate  Neck: Trachea midline; FROM, supple, no thyromegaly or lymphadenopathy  Lungs: CTA, with normal respiratory effort and no intercostal retractions  CV: RRR, no MRGs, no JVD  Abdomen: Soft, non-tender; no masses or HSM  Extremities: No peripheral edema or extremity lymphadenopathy  Skin: Normal temperature, turgor and texture; no rash, ulcers or subcutaneous nodules  Psych: Appropriate affect, alert and oriented to person, place and time    Data:  Lipids:   Lab Results   Component Value Date/Time    CHOLSTRLTOT 151 01/03/2022 07:53 AM    TRIGLYCERIDE 118 01/03/2022 07:53 AM    HDL 42 01/03/2022 07:53 AM    LDL 85 01/03/2022 07:53 AM        BMP:  Lab Results   Component Value Date/Time    SODIUM 140 03/04/2022 1058    POTASSIUM 4.7 03/04/2022 1058    CHLORIDE 103 03/04/2022 1058    CO2 29 03/04/2022 1058    GLUCOSE 102 (H) 03/04/2022 1058    BUN 17 03/04/2022 1058    CREATININE 1.04 03/04/2022 1058    CALCIUM 9.2 03/04/2022 1058    ANION 8.0 03/04/2022 1058        TSH:   Lab Results   Component Value Date/Time    TSHULTRASEN 3.900 10/02/2019 0800        THYROXINE (T4):   No results found for: JUAN MANUEL     CBC:   Lab Results   Component Value Date/Time    WBC 5.9 03/04/2022 10:58 AM    RBC 5.19 03/04/2022 10:58 AM    HEMOGLOBIN 15.5 03/04/2022 10:58 AM    HEMATOCRIT 45.9 03/04/2022 10:58 AM    MCV 88.4 03/04/2022 10:58 AM    MCH 29.9 03/04/2022 10:58 AM    MCHC 33.8 03/04/2022 10:58 AM    RDW 40.0 03/04/2022 10:58 AM    PLATELETCT 196 03/04/2022 10:58 AM    MPV 9.5 " 03/04/2022 10:58 AM    NEUTSPOLYS 52.60 03/04/2022 10:58 AM    LYMPHOCYTES 37.10 03/04/2022 10:58 AM    MONOCYTES 8.70 03/04/2022 10:58 AM    EOSINOPHILS 0.90 03/04/2022 10:58 AM    BASOPHILS 0.50 03/04/2022 10:58 AM    IMMGRAN 0.20 03/04/2022 10:58 AM    NRBC 0.00 03/04/2022 10:58 AM    NEUTS 3.10 03/04/2022 10:58 AM    LYMPHS 2.18 03/04/2022 10:58 AM    MONOS 0.51 03/04/2022 10:58 AM    EOS 0.05 03/04/2022 10:58 AM    BASO 0.03 03/04/2022 10:58 AM    IMMGRANAB 0.01 03/04/2022 10:58 AM    NRBCAB 0.00 03/04/2022 10:58 AM        CBC w/o DIFF  Lab Results   Component Value Date/Time    WBC 5.9 03/04/2022 10:58 AM    RBC 5.19 03/04/2022 10:58 AM    HEMOGLOBIN 15.5 03/04/2022 10:58 AM    MCV 88.4 03/04/2022 10:58 AM    MCH 29.9 03/04/2022 10:58 AM    MCHC 33.8 03/04/2022 10:58 AM    RDW 40.0 03/04/2022 10:58 AM    MPV 9.5 03/04/2022 10:58 AM       Prior echo/stress reviewed: CARIDAD velocity 40 cm/s    EKG interpreted by me: NSR    Impression/Plan:  1. Paroxysmal atrial fibrillation (HCC)  EKG        Afib s/p ablation  Hypercoagulable state due to afib  HTN    - He is taking Eliquis for [id number] of 2, continue for now  - SBP well controlled continue Valsartan  - No afib recurrence to date    Plan annual FV    Yobany Gray MD  Cardiac Electrophysiology

## 2022-12-30 LAB — EKG IMPRESSION: NORMAL

## 2023-01-20 ENCOUNTER — OFFICE VISIT (OUTPATIENT)
Dept: DERMATOLOGY | Facility: IMAGING CENTER | Age: 73
End: 2023-01-20
Payer: MEDICARE

## 2023-01-20 DIAGNOSIS — D22.9 NEVUS: ICD-10-CM

## 2023-01-20 DIAGNOSIS — L30.4 INTERTRIGO: ICD-10-CM

## 2023-01-20 DIAGNOSIS — L57.0 ACTINIC KERATOSIS: ICD-10-CM

## 2023-01-20 DIAGNOSIS — L81.4 LENTIGO: ICD-10-CM

## 2023-01-20 DIAGNOSIS — L90.8 SKIN AGING: ICD-10-CM

## 2023-01-20 DIAGNOSIS — Z85.828 HX OF NONMELANOMA SKIN CANCER: ICD-10-CM

## 2023-01-20 DIAGNOSIS — L11.1 GROVER'S DISEASE: ICD-10-CM

## 2023-01-20 DIAGNOSIS — L82.1 SEBORRHEIC KERATOSIS: ICD-10-CM

## 2023-01-20 DIAGNOSIS — D49.2 NEOPLASM OF SKIN: ICD-10-CM

## 2023-01-20 DIAGNOSIS — L21.9 SEBORRHEA: ICD-10-CM

## 2023-01-20 DIAGNOSIS — Z12.83 SKIN CANCER SCREENING: ICD-10-CM

## 2023-01-20 PROCEDURE — 69100 BIOPSY OF EXTERNAL EAR: CPT | Mod: 59 | Performed by: DERMATOLOGY

## 2023-01-20 PROCEDURE — 17000 DESTRUCT PREMALG LESION: CPT | Mod: 59 | Performed by: DERMATOLOGY

## 2023-01-20 PROCEDURE — 11102 TANGNTL BX SKIN SINGLE LES: CPT | Mod: 59 | Performed by: DERMATOLOGY

## 2023-01-20 PROCEDURE — 17003 DESTRUCT PREMALG LES 2-14: CPT | Performed by: DERMATOLOGY

## 2023-01-20 PROCEDURE — 99213 OFFICE O/P EST LOW 20 MIN: CPT | Mod: 25 | Performed by: DERMATOLOGY

## 2023-01-20 RX ORDER — TRIAMCINOLONE ACETONIDE 1 MG/G
CREAM TOPICAL
Qty: 454 G | Refills: 0 | Status: SHIPPED | OUTPATIENT
Start: 2023-01-20 | End: 2023-05-18

## 2023-01-20 NOTE — PROGRESS NOTES
"  CC: Follow up CLEVE       Subjective: Previously seen patient here for CLEVE.      Spots on nose, left ear. Crust and not resolved.   Scar on l knee after joint replacement, desires scar trx recs  Rash on abdomen, responds well to TAC cream when used with aveeno, needs refill      From prior notes:  Left groin - hx of staph and jock itch. Desires eval.     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 No itch.  No cough.  DermPMH: mult past NMSC  No problem-specific Assessment & Plan notes found for this encounter.      Relevant PMH: mult med comorbidities  Social: never smoker    PE: Gen:WDWN male in NAD. Skin: Scalp/face/eyes/lips/neck/chest/back/arms/legs/hands/feet/buttocks - examined.  Genitals exam declined  -thin HK papules on forehead, left nasal alae and left neck.  -crusted papule on left antehelix, approx 0.7cm  -pearly papule + pigment globules on chest, approx 0.8cm  -scattered hyperpigmented macules/papules, appearing benign on torso and extremities  -crusted papules scattered on lower abdomen  -scar on left knee  -maceration between toes, left foot    A/P: AKs: left neck, nose and forehead:  AKs:  -counseled on diagnosis and treatment, including risks/benefits/alternatives of cyrotherapy  -LN2 25 sec X 2 cycles X 3lesions  -f/u if persists at 1 month    AKs: hands/arms  -efudex cream BID X 2-4 weeks, se reviewed,     Neoplasm NOS: chest bcc  -consent for bx, including R/B/A. Cleaned with EtOH, anesthesia with lidocaine 1% + epinephrine, shave bx, AlCl3 for hemostasis  -vaseline/bandage and wound care reviewed    Neoplasm " NOS: left ear, r/o NMSC  -consent for bx, including R/B/A. Cleaned with EtOH, anesthesia with lidocaine 1% + epinephrine, shave bx, AlCl3 for hemostasis  -vaseline/bandage and wound care reviewed    Hx of skin cancer: most recently SCCIS - free margins, right arm, observing. F/u future visit.  Has declined ED&C, excision. NER TODAY  -cont'd sunprotection and skin cancer surveillance  -Q 6mo-annual exam recommended; f/u suspicious lesions PRN  -rec nicotinamide OTC 500mg PO BID    Nevi: benign appearing:  -Reviewed skin cancer detection/prevention  -RTC PRN growth/changes/concerning features    Lentigos/SKs: benign  -reassurance  -reviewed skin cancer detection/prevention    Landon'juan josé chiang:  -TAC 0.1% cream BID PRN rf    Scar knee, mederma OTC reviewed    F/u 6 months    I have reviewed medications relevant to my specialty.

## 2023-01-20 NOTE — PROGRESS NOTES
"CC: Follow up CLEVE       Subjective: Previously seen patient here for CLEVE.      Denies new, growing, changing, itching or bleeding skin lesions today.    From prior notes:  Left groin - hx of staph and jock itch. Desires eval.     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 No itch.  No cough.  DermPMH: mult past NMSC  No problem-specific Assessment & Plan notes found for this encounter.      Relevant PMH: mult med comorbidities  Social: never smoker    PE: Gen:WDWN male in NAD. Skin: Scalp/face/eyes/lips/neck/chest/back/arms/legs/hands/feet/buttocks - examined.  Genitals exam declined  -thin HK papules on right conchal bowl, scattered on face and few on left arm, one on right wrist. Also gritty, scattered on right temple and nose, cheeks.   -scattered hyperpigmented macules/papules, appearing benign on torso and extremities  -crusted papules scattered on lower abdomen    A/P: AKs: r ear, face - many, left arm X 3, right wrist X1  -counseled on diagnosis and treatment, including risks/benefits/alternatives of cyrotherapy  -LN2 25 sec X 2 cycles X 15+lesions  -f/u if persists at 1 month      AKs: face  -efudex cream BID X 2-4 weeks, se reviewed, to complete face trx before next CLEVE visit      Hx of skin cancer: most recently SCCIS - free margins, right arm, observing. F/u future visit.  Has declined ED&C, excision. NER TODAY  -cont'd sunprotection and skin cancer surveillance  -Q 6mo-annual exam recommended; f/u suspicious lesions PRN  -rec nicotinamide OTC 500mg PO BID    Nevi: benign appearing:  -Reviewed skin cancer " detection/prevention  -RTC PRN growth/changes/concerning features    Lentigos/SKs: benign  -reassurance  -reviewed skin cancer detection/prevention    Taco chiang:  No trx reviewed today    F/u 6 months    I have reviewed medications relevant to my specialty.

## 2023-01-24 ENCOUNTER — HOSPITAL ENCOUNTER (OUTPATIENT)
Dept: RADIOLOGY | Facility: MEDICAL CENTER | Age: 73
End: 2023-01-24
Payer: MEDICARE

## 2023-01-24 DIAGNOSIS — R13.14 PHARYNGOESOPHAGEAL DYSPHAGIA: ICD-10-CM

## 2023-01-24 DIAGNOSIS — R05.9 COUGH, UNSPECIFIED TYPE: ICD-10-CM

## 2023-01-24 PROCEDURE — 74230 X-RAY XM SWLNG FUNCJ C+: CPT

## 2023-01-24 PROCEDURE — 700117 HCHG RX CONTRAST REV CODE 255

## 2023-01-24 PROCEDURE — 92611 MOTION FLUOROSCOPY/SWALLOW: CPT

## 2023-01-24 RX ADMIN — BARIUM SULFATE 700 MG: 700 TABLET ORAL at 15:30

## 2023-01-24 NOTE — OP THERAPY EVALUATION
"[facility] Therapy Services  Outpatient Modified Barium Swallow Study        Patient Name: Gopal Mallory Jr.  Date of Evaluation: 1/24/23  Referring Provider: Jenny Wang NP  Fax: [phone]      Patient History & Reason for Referral  The pt is a 73 y/o M who was referred for a Modified Barium Swallow Study (MBSS) due to chronic dry cough and occasional coughing with oral intake, specifically with liquids. This has been ongoing to \"years\" and has worsened over time. Pt reported that he occasionally feels as though pills get stuck in his throat.    PMHx:  GERD, Parkinson's disease, basal cell/squamous cell cancer, HTN, Afib    PSHx:  Left total knee arthroplasty (12/2022)      Level of Consciousness: Alert, Awake  Affect/Behavior: Appropriate, Calm, Cooperative  Follows Directives: Yes  Orientation: Oriented x 4  Hearing: Functional hearing  Vision: Functional vision    Oral Mechanism Evaluation  Facial Symmetry: Equal  Facial Sensation: Equal  Labial Observations: WFL  Lingual Observations: Midline  Dentition: Good, Natural dentition  Comments:    Voice  Quality: WFL  Resonance: WFL  Intensity: Appropriate  Cough: WFL  Comments:    Pertinent Information  Oxygen Requirements: Room Air  Secretion Management: WNL  Factor(s) Affecting Performance: None    Functional Oral Intake Scale (FOIS)  Level 7 - Total oral diet with no restrictions      Subjective  Pt was pleasant and cooperative. He was ambulatory and arrived unaccompanied.      Discussed with the risks, benefits, and alternatives of the MBSS procedure. Patient/family acknowledged and agreed to proceed.    Assessment  Videofluoroscopic Swallow Study was conducted in the lateral and AP projection(s) to evaluate oropharyngeal swallow function. A radiology tech was present to assist with the procedure.     Positioning: seated upright in fluoroscopy chair, standing (AP view)  Anatomic View:  Prominence of the cricopharyngeus intermittently " appreciated at the C5 level with partial bolus flow obstruction  Bolus Administration: Patient  PO barium contrast trials: Varibar thin liquid, Varibar nectar (mildly thick) liquid, Varibar pudding, solid coated in Varibar pudding, barium tablet      Consistency PAS Score Timing Comments   Thin Liquid 1 N/A Tsp, Cup (single, serial), Straw (single, serial)   Mildly Thick Liquid 1 N/A Cup, Straw   Pudding 1 N/A    Solid 1 N/A    Barium tablet with water 1 N/A        Penetration-Aspiration Scale  1     No contrast enters airway  2     Contrast enters the airway, remains above the vocal folds, and is ejected from the airway (not seen in the airway at the end of the swallow).  3     Contrast enters the airway, remains above the vocal folds, and is not ejected from the airway (is seen in the airway after the swallow).  4     Contrast enters the airway, contacts the vocal folds, and is ejected from the airway.  5     Contrast enters the airway, contacts the vocal folds, and is not ejected from the airway  6     Contrast enters the airway, crosses the plane of the vocal folds, and is ejected from the airway.  7     Contrast enters the airway, crosses the plane of the vocal folds, and is not ejected from the airway despite effort.  8     Contrast enters the airway, crosses the plane of the vocal folds, is not ejected from the airway and there is no response to aspiration.      Oral phase:  Appropriate oral acceptance and sufficient containment, Adequate bite and mastication of the solids. Good oral hold. Mild lingual stasis across all consistencies cleared spontaneously.      Pharyngeal phase:  Timely pharyngeal onset. No penetration nor aspiration appreciated. Mildly decreased BOT retraction resulted in trace vallecular stasis across all consistencies, cleared following a spontaneous additional swallow. The barium tablet was briefly retained in the valleculae but cleared following a spontaneous swallow.      Esophageal  phase:  AP views taken with thin liquids by straw, pudding and regular consistencies. Standing lateral and AP view taken with the barium tablet. There was retention of the barium table within the medial esophagus. There was retention of pudding and regular textures in the distal esophagus with mild retrograde movement below the PES.    Compensatory Strategies:  Liquid Wash - cleared the retained barium within the esophagus.      Clinical Impressions  The pt presents with a functional oropharyngeal swallow and possible esophageal dysphagia. All anatomic and physiologic domains of the oral and pharyngeal phases of swallowing appear within normal limits. Oropharyngeal clearance and prandial airway protection are intact across all consistencies under typical eating behaviors/conditions. Aspiration risk from oral intake is low without indication for a modified diet at this time.      Recommendations  Regular solids, Thin liquids  2.  Swallowing Instructions & Precautions:   Supervision: Independent  Positioning: Fully upright and midline during oral intake  Medication: Whole with liquid, Cut large pills, As tolerated  Strategies: Small bites/sips, Alternate bites and sips, Slow rate of intake  Oral Care: BID  3.  Specialist Referral(s): None  4.  No further speech therapy services appear indicated at this time. SLP encouraged the pt to continue monitoring his swallowing d/t the progressive nature of Parkinson's disease.       Results and recs d/w patient, who verbalized understanding. Thank you.  Dayami Gardiner, SLP

## 2023-01-26 ENCOUNTER — TELEPHONE (OUTPATIENT)
Dept: DERMATOLOGY | Facility: IMAGING CENTER | Age: 73
End: 2023-01-26
Payer: MEDICARE

## 2023-01-26 DIAGNOSIS — C44.229 SCC (SQUAMOUS CELL CARCINOMA), EAR, LEFT: ICD-10-CM

## 2023-01-26 RX ORDER — CEPHALEXIN 500 MG/1
CAPSULE ORAL
Qty: 4 CAPSULE | Refills: 0 | Status: SHIPPED | OUTPATIENT
Start: 2023-01-26 | End: 2023-12-14

## 2023-01-29 SDOH — ECONOMIC STABILITY: FOOD INSECURITY: WITHIN THE PAST 12 MONTHS, YOU WORRIED THAT YOUR FOOD WOULD RUN OUT BEFORE YOU GOT MONEY TO BUY MORE.: NEVER TRUE

## 2023-01-29 SDOH — ECONOMIC STABILITY: FOOD INSECURITY: WITHIN THE PAST 12 MONTHS, THE FOOD YOU BOUGHT JUST DIDN'T LAST AND YOU DIDN'T HAVE MONEY TO GET MORE.: NEVER TRUE

## 2023-01-29 SDOH — ECONOMIC STABILITY: HOUSING INSECURITY: IN THE LAST 12 MONTHS, HOW MANY PLACES HAVE YOU LIVED?: 1

## 2023-01-29 SDOH — ECONOMIC STABILITY: INCOME INSECURITY: IN THE LAST 12 MONTHS, WAS THERE A TIME WHEN YOU WERE NOT ABLE TO PAY THE MORTGAGE OR RENT ON TIME?: NO

## 2023-01-29 SDOH — ECONOMIC STABILITY: INCOME INSECURITY: HOW HARD IS IT FOR YOU TO PAY FOR THE VERY BASICS LIKE FOOD, HOUSING, MEDICAL CARE, AND HEATING?: NOT HARD AT ALL

## 2023-01-29 SDOH — HEALTH STABILITY: MENTAL HEALTH
STRESS IS WHEN SOMEONE FEELS TENSE, NERVOUS, ANXIOUS, OR CAN'T SLEEP AT NIGHT BECAUSE THEIR MIND IS TROUBLED. HOW STRESSED ARE YOU?: NOT AT ALL

## 2023-01-29 SDOH — HEALTH STABILITY: PHYSICAL HEALTH: ON AVERAGE, HOW MANY DAYS PER WEEK DO YOU ENGAGE IN MODERATE TO STRENUOUS EXERCISE (LIKE A BRISK WALK)?: 5 DAYS

## 2023-01-29 SDOH — HEALTH STABILITY: PHYSICAL HEALTH: ON AVERAGE, HOW MANY MINUTES DO YOU ENGAGE IN EXERCISE AT THIS LEVEL?: 60 MIN

## 2023-01-29 ASSESSMENT — SOCIAL DETERMINANTS OF HEALTH (SDOH)
HOW OFTEN DO YOU ATTEND CHURCH OR RELIGIOUS SERVICES?: NEVER
HOW OFTEN DO YOU ATTENT MEETINGS OF THE CLUB OR ORGANIZATION YOU BELONG TO?: MORE THAN 4 TIMES PER YEAR
HOW HARD IS IT FOR YOU TO PAY FOR THE VERY BASICS LIKE FOOD, HOUSING, MEDICAL CARE, AND HEATING?: NOT HARD AT ALL
WITHIN THE PAST 12 MONTHS, YOU WORRIED THAT YOUR FOOD WOULD RUN OUT BEFORE YOU GOT THE MONEY TO BUY MORE: NEVER TRUE
HOW OFTEN DO YOU HAVE A DRINK CONTAINING ALCOHOL: 4 OR MORE TIMES A WEEK
IN A TYPICAL WEEK, HOW MANY TIMES DO YOU TALK ON THE PHONE WITH FAMILY, FRIENDS, OR NEIGHBORS?: MORE THAN THREE TIMES A WEEK
HOW OFTEN DO YOU GET TOGETHER WITH FRIENDS OR RELATIVES?: MORE THAN THREE TIMES A WEEK
DO YOU BELONG TO ANY CLUBS OR ORGANIZATIONS SUCH AS CHURCH GROUPS UNIONS, FRATERNAL OR ATHLETIC GROUPS, OR SCHOOL GROUPS?: YES
IN A TYPICAL WEEK, HOW MANY TIMES DO YOU TALK ON THE PHONE WITH FAMILY, FRIENDS, OR NEIGHBORS?: MORE THAN THREE TIMES A WEEK
HOW MANY DRINKS CONTAINING ALCOHOL DO YOU HAVE ON A TYPICAL DAY WHEN YOU ARE DRINKING: 1 OR 2
DO YOU BELONG TO ANY CLUBS OR ORGANIZATIONS SUCH AS CHURCH GROUPS UNIONS, FRATERNAL OR ATHLETIC GROUPS, OR SCHOOL GROUPS?: YES
HOW OFTEN DO YOU GET TOGETHER WITH FRIENDS OR RELATIVES?: MORE THAN THREE TIMES A WEEK
HOW OFTEN DO YOU HAVE SIX OR MORE DRINKS ON ONE OCCASION: LESS THAN MONTHLY
HOW OFTEN DO YOU ATTEND CHURCH OR RELIGIOUS SERVICES?: NEVER
HOW OFTEN DO YOU ATTENT MEETINGS OF THE CLUB OR ORGANIZATION YOU BELONG TO?: MORE THAN 4 TIMES PER YEAR

## 2023-01-29 ASSESSMENT — LIFESTYLE VARIABLES
HOW MANY STANDARD DRINKS CONTAINING ALCOHOL DO YOU HAVE ON A TYPICAL DAY: 1 OR 2
AUDIT-C TOTAL SCORE: 5
HOW OFTEN DO YOU HAVE SIX OR MORE DRINKS ON ONE OCCASION: LESS THAN MONTHLY
SKIP TO QUESTIONS 9-10: 0
HOW OFTEN DO YOU HAVE A DRINK CONTAINING ALCOHOL: 4 OR MORE TIMES A WEEK

## 2023-01-30 ENCOUNTER — OFFICE VISIT (OUTPATIENT)
Dept: MEDICAL GROUP | Facility: MEDICAL CENTER | Age: 73
End: 2023-01-30
Payer: MEDICARE

## 2023-01-30 VITALS
HEIGHT: 77 IN | BODY MASS INDEX: 27.8 KG/M2 | HEART RATE: 77 BPM | TEMPERATURE: 97.3 F | OXYGEN SATURATION: 100 % | SYSTOLIC BLOOD PRESSURE: 134 MMHG | WEIGHT: 235.45 LBS | DIASTOLIC BLOOD PRESSURE: 82 MMHG

## 2023-01-30 DIAGNOSIS — D68.69 SECONDARY HYPERCOAGULABLE STATE (HCC): ICD-10-CM

## 2023-01-30 DIAGNOSIS — Z00.00 MEDICARE ANNUAL WELLNESS VISIT, SUBSEQUENT: Primary | ICD-10-CM

## 2023-01-30 DIAGNOSIS — M17.12 OSTEOARTHRITIS OF LEFT KNEE, UNSPECIFIED OSTEOARTHRITIS TYPE: ICD-10-CM

## 2023-01-30 DIAGNOSIS — C44.229 SQUAMOUS CELL CANCER OF EXTERNAL EAR, LEFT: ICD-10-CM

## 2023-01-30 DIAGNOSIS — G20.A1 PARKINSON'S DISEASE (HCC): ICD-10-CM

## 2023-01-30 DIAGNOSIS — I10 ESSENTIAL HYPERTENSION: Chronic | ICD-10-CM

## 2023-01-30 DIAGNOSIS — I48.0 PAROXYSMAL ATRIAL FIBRILLATION (HCC): Chronic | ICD-10-CM

## 2023-01-30 PROCEDURE — G0439 PPPS, SUBSEQ VISIT: HCPCS | Performed by: INTERNAL MEDICINE

## 2023-01-30 ASSESSMENT — ENCOUNTER SYMPTOMS: GENERAL WELL-BEING: GOOD

## 2023-01-30 ASSESSMENT — ACTIVITIES OF DAILY LIVING (ADL): BATHING_REQUIRES_ASSISTANCE: 0

## 2023-01-30 ASSESSMENT — PATIENT HEALTH QUESTIONNAIRE - PHQ9: CLINICAL INTERPRETATION OF PHQ2 SCORE: 0

## 2023-01-30 ASSESSMENT — FIBROSIS 4 INDEX: FIB4 SCORE: 0.99

## 2023-01-30 NOTE — PROGRESS NOTES
Chief Complaint   Patient presents with    Annual Wellness Visit     HPI: Gopal is a 72 y.o. here for Medicare Annual Wellness Visit  Is feeling well and denies active complaints.  He has recently had left total knee arthroplasty and is recovering well.    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  Has upcoming Mohs procedure for removal.  Patient Active Problem List    Diagnosis Date Noted    Squamous cell cancer of external ear, left 01/30/2023    Osteoarthritis of left knee 09/28/2022    Secondary hypercoagulable state (HCC) 01/06/2022    Effusion of left elbow 10/20/2021    Long term (current) use of anticoagulants 08/05/2021    Parkinson's disease (HCC) 04/08/2020    Gastroesophageal reflux disease without esophagitis 01/21/2020    Family history of prostate cancer 04/22/2019    Impaired fasting glucose 03/11/2019    Paroxysmal atrial fibrillation (HCC) 02/11/2019    History of basal cell carcinoma (BCC) of skin 02/11/2019    History of varicocele 02/11/2019    Essential hypertension 01/15/2019       Current Outpatient Medications   Medication Sig Dispense Refill    cephALEXin (KEFLEX) 500 MG Cap Take 4 pills 1 hour before skin surgery. 4 Capsule 0    triamcinolone acetonide (KENALOG) 0.1 % Cream AAA chest/back/abdomen BID PRN rash.  Avoid use face, axilla, groin 454 g 0    apixaban (ELIQUIS) 5mg Tab TAKE 1 TABLET BY MOUTH TWICE A  Tablet 2    omeprazole (PRILOSEC) 20 MG delayed-release capsule TAKE 1 CAPSULE BY MOUTH EVERY DAY. TAKE EVERY MORNING FOR 30 DAYS POST ABLATION TO PROTECT YOUR ESOPHAGUS. 90 Capsule 3    valsartan (DIOVAN) 80 MG Tab Take 0.5 Tablets by mouth every day. 90 Tablet 3    carbidopa-levodopa (SINEMET)  MG Tab TAKE 1 TABLET BY MOUTH TWICE A  Tablet 3    rasagiline (AZILECT) 1 MG Tab Take 1 Tablet by mouth every day. 90 Tablet 3    fluticasone (FLONASE) 50 MCG/ACT nasal spray Spray 1 Spray in nose 1 time daily  as needed (ALLERGY).       No current facility-administered medications for this visit.      Patient is taking medications as noted in medication list.  Current supplements as per medication list.     Allergies: Molds & smuts    Current social contact/activities: golf, going [employer] league    Is patient current with immunizations? No     He  reports that he has never smoked. He has never used smokeless tobacco. He reports current alcohol use of about 8.4 oz per week. He reports that he does not use drugs.  Counseling given: Not Answered    ROS:    Gait: Uses no assistive device   Ostomy: No   Other tubes: No   Amputations: No   Chronic oxygen use No   Last eye exam: 3 mo ago   Wears hearing aids: No   : Denies any urinary leakage during the last 6 months  Screening:    Depression Screening  Little interest or pleasure in doing things?  0 - not at all  Feeling down, depressed, or hopeless? 0 - not at all  Patient Health Questionnaire Score: 0    If depressive symptoms identified deferred to follow up visit unless specifically addressed in assessment and plan.    Interpretation of PHQ-9 Total Score   Score Severity   1-4 No Depression   5-9 Mild Depression   10-14 Moderate Depression   15-19 Moderately Severe Depression   20-27 Severe Depression    Screening for Cognitive Impairment  Three Minute Recall (daughter, heaven, mountain)  3/3    Jimbo clock face with all 12 numbers and set the hands to show 10 past 11.  Yes    If cognitive concerns identified, deferred for follow up unless specifically addressed in assessment and plan.    Fall Risk Assessment  Has the patient had two or more falls in the last year or any fall with injury in the last year?  No  If fall risk identified, deferred for follow up unless specifically addressed in assessment and plan.    Safety Assessment  Throw rugs on floor.  Yes  Handrails on all stairs.  Yes  Good lighting in all hallways.  Yes  Difficulty hearing.  No  Patient counseled about  all safety risks that were identified.    Functional Assessment ADLs  Are there any barriers preventing you from cooking for yourself or meeting nutritional needs?  No.    Are there any barriers preventing you from driving safely or obtaining transportation?  No.    Are there any barriers preventing you from using a telephone or calling for help?  No.    Are there any barriers preventing you from shopping?  No.    Are there any barriers preventing you from taking care of your own finances?  No.    Are there any barriers preventing you from managing your medications?  No.    Are there any barriers preventing you from showering, bathing or dressing yourself?  No.    Are you currently engaging in any exercise or physical activity?  Yes.     What is your perception of your health?  Good.    Advance Care Planning  Do you have an Advance Directive, Living Will, Durable Power of , or POLST? Yes      Durable Power of    is not on file - instructed patient to bring in a copy to scan into their chart    Health Maintenance Summary            Overdue - IMM ZOSTER VACCINES (1 of 2) Overdue - never done      No completion history exists for this topic.              Overdue - IMM HEP B VACCINE (3 of 3 - 19+ 3-dose series) Overdue since 11/16/2015 06/16/2015  Imm Admin: Hepatitis B Vaccine (Adol/Adult)    05/19/2015  Imm Admin: Hepatitis B Vaccine (Adol/Adult)              Overdue - COVID-19 Vaccine (4 - Booster for Pfizer series) Overdue since 1/7/2022 11/12/2021  Imm Admin: PFIZER PURPLE CAP SARS-COV-2 VACCINATION (12+)    03/23/2021  Imm Admin: PFIZER PURPLE CAP SARS-COV-2 VACCINATION (12+)    03/02/2021  Imm Admin: PFIZER PURPLE CAP SARS-COV-2 VACCINATION (12+)              Annual Wellness Visit (Every 366 Days) Next due on 1/31/2024 01/30/2023  Visit Dx: Medicare annual wellness visit, subsequent    01/30/2023  Level of Service: ANNUAL WELLNESS VISIT-INCLUDES PPPS SUBSEQUE*    01/06/2022  Level  of Service: ANNUAL WELLNESS VISIT-INCLUDES PPPS SUBSEQUE*    01/06/2022  Visit Dx: Medicare annual wellness visit, subsequent    12/30/2019  Initial Annual Wellness Visit - Includes PPPS ()              COLORECTAL CANCER SCREENING (COLONOSCOPY - Every 5 Years) Next due on 8/8/2024 08/08/2019  REFERRAL TO GI FOR COLONOSCOPY    01/01/2016  COLONOSCOPY (Done - pt states done 2016 due 5 years)              IMM DTaP/Tdap/Td Vaccine (4 - Td or Tdap) Next due on 1/6/2032 01/06/2022  Imm Admin: Tdap Vaccine    03/31/2010  Imm Admin: Tdap Vaccine    01/01/2010  Imm Admin: Tdap Vaccine              IMM PNEUMOCOCCAL VACCINE: 65+ Years (Series Information) Completed      04/22/2019  Imm Admin: Pneumococcal polysaccharide vaccine (PPSV-23)    12/12/2017  Imm Admin: Pneumococcal Conjugate Vaccine (Prevnar/PCV-13)              HEPATITIS C SCREENING  Completed      01/16/2020  HEP C VIRUS ANTIBODY              IMM INFLUENZA (Series Information) Completed      11/21/2022  Imm Admin: Influenza Vaccine Adult HD    11/20/2022  Imm Admin: Influenza Vaccine Adult HD    10/20/2021  Imm Admin: Influenza Vaccine Adult HD    11/12/2020  Imm Admin: Influenza Vaccine Adult HD    09/30/2019  Imm Admin: Influenza Vaccine Adult HD    Only the first 5 history entries have been loaded, but more history exists.              IMM MENINGOCOCCAL ACWY VACCINE (Series Information) Aged Out      No completion history exists for this topic.                  Patient Care Team:  Erin Madden M.D. as PCP - General (Internal Medicine)  [facility]  Yobany Gray M.D. as Consulting Physician (Internal Medicine Clinical Cardiac Electrophysiology)  Aniket Waterman M.D. as Consulting Physician (Neurology)    Social History     Tobacco Use    Smoking status: Never    Smokeless tobacco: Never   Vaping Use    Vaping Use: Never used   Substance Use Topics    Alcohol use: Yes     Alcohol/week: 8.4 oz     Types: 7  "Glasses of wine, 7 Standard drinks or equivalent per week     Comment: 2 glasses wine/night    Drug use: No     Family History   Problem Relation Age of Onset    Cancer Mother         bladder non-smoker    Dementia Father     Cancer Brother         prostate age 52     He  has a past medical history of Arrhythmia, Arthritis (03/04/2022), Bowel habit changes (03/04/2022), Cancer (HCC), Dental disorder (03/04/2022), GERD (gastroesophageal reflux disease), Hypertension, Parkinson disease (HCC), Rash (03/04/2022), and Snoring.   Past Surgical History:   Procedure Laterality Date    PB TOTAL KNEE ARTHROPLASTY Left 12/05/2022    Procedure: LEFT TOTAL KNEE ARTHROPLASTY;  Surgeon: Jim Hu M.D.;  Location: [facility];  Service: Orthopedics    ARTHROPLASTY      DENTAL EXTRACTION(S)      wisdom teeth removed at age 18    OTHER      mohs to nose, back       Exam:   /82 (BP Location: Left arm)   Pulse 77   Temp 36.3 °C (97.3 °F) (Temporal)   Ht 1.956 m (6' 5\")   Wt 107 kg (235 lb 7.2 oz)   SpO2 100%  Body mass index is 27.92 kg/m².    Hearing good.    Dentition good  Alert, oriented in no acute distress.  Eye contact is good, speech goal directed, affect calm      Assessment and Plan. The following treatment and monitoring plan is recommended:    Problem List Items Addressed This Visit       Essential hypertension (Chronic)    Relevant Orders    Lipid Profile    CBC WITH DIFFERENTIAL    TSH WITH REFLEX TO FT4    Paroxysmal atrial fibrillation (HCC) (Chronic)     Status post ablation 2022.  Regular rhythm, AC with Eliquis.         Osteoarthritis of left knee     Status post total knee arthroplasty, doing physical therapy, tolerating well.         Parkinson's disease (HCC)     Chronic, controlled on Sinemet and rasagiline.  Follows with neurology regularly.         Secondary hypercoagulable state (HCC)     Due to A. fib, AC with Eliquis, follow-up with cardiology.           Squamous cell " cancer of external ear, left     Plan for Mohs procedure by dermatology in 2 weeks.          Other Visit Diagnoses       Medicare annual wellness visit, subsequent    -  Primary          Services suggested: No services needed at this time  Health Care Screening recommendations as per orders if indicated.  Referrals offered: PT/OT/Nutrition counseling/Behavioral Health/Smoking cessation as per orders if indicated.    Discussion today about general wellness and lifestyle habits:    Prevent falls and reduce trip hazards; Cautioned about securing or removing rugs.  Have a working fire alarm and carbon monoxide detector;   Engage in regular physical activity and social activities     Follow-up: Return if symptoms worsen or fail to improve, for annual wellness visit, Medicare Wellness visit.    Please note that this dictation was created using voice recognition software. I have made every reasonable attempt to correct obvious errors, but I expect that there are errors of grammar and possibly content that I did not discover before finalizing the note.

## 2023-02-06 ENCOUNTER — TELEPHONE (OUTPATIENT)
Dept: DERMATOLOGY | Facility: IMAGING CENTER | Age: 73
End: 2023-02-06
Payer: MEDICARE

## 2023-03-17 ENCOUNTER — OFFICE VISIT (OUTPATIENT)
Dept: URGENT CARE | Facility: CLINIC | Age: 73
End: 2023-03-17
Payer: MEDICARE

## 2023-03-17 VITALS
HEIGHT: 77 IN | BODY MASS INDEX: 27.16 KG/M2 | HEART RATE: 96 BPM | OXYGEN SATURATION: 97 % | TEMPERATURE: 98.3 F | WEIGHT: 230 LBS | DIASTOLIC BLOOD PRESSURE: 80 MMHG | SYSTOLIC BLOOD PRESSURE: 148 MMHG | RESPIRATION RATE: 18 BRPM

## 2023-03-17 DIAGNOSIS — S01.302A OPEN WOUND OF LEFT EAR, UNSPECIFIED OPEN WOUND TYPE, INITIAL ENCOUNTER: ICD-10-CM

## 2023-03-17 PROCEDURE — 99213 OFFICE O/P EST LOW 20 MIN: CPT

## 2023-03-17 ASSESSMENT — FIBROSIS 4 INDEX: FIB4 SCORE: 0.99

## 2023-03-17 ASSESSMENT — ENCOUNTER SYMPTOMS: FEVER: 0

## 2023-03-17 NOTE — PROGRESS NOTES
"Subjective:     Gopal Mallory Jr. is a 72 y.o. male who presents for Wound Check (X 3 days, wound check on LT ear procedure, bleeding. Needs new dressing.)      He had a procedure at derm on Tuesday. Instructed to  take bandage off today. When he did so, it began bleeding.     Wound Check  He was originally treated 3 to 5 days ago. Prior ED Treatment: Squamous cell MOS surgery. Maximum temperature: Afebrile. There has been bloody discharge from the wound. There is no redness present. There is no swelling present. The pain has improved (Mild pain, which has not worsened in severity).     Review of Systems   Constitutional:  Negative for fever.   HENT:  Negative for ear discharge and ear pain.    Skin:  Negative for itching and rash.   All other systems reviewed and are negative.    PMH:   Past Medical History:   Diagnosis Date    Arrhythmia     Afib    Arthritis 03/04/2022    Knuckles and knees.    Bowel habit changes 03/04/2022    Constipation    Cancer (HCC)     Basal cell/ Squamous skin    Dental disorder 03/04/2022    Waiting for implant front top tooth.  Tooth pulled a couple of months ago.    GERD (gastroesophageal reflux disease)     Hypertension     Parkinson disease (HCC)     Rash 03/04/2022    \"Jock Rash\"    Snoring     Negative sleep study.     ALLERGIES:   Allergies   Allergen Reactions    Molds & Smuts Unspecified     Sneezing     SURGHX:   Past Surgical History:   Procedure Laterality Date    PB TOTAL KNEE ARTHROPLASTY Left 12/05/2022    Procedure: LEFT TOTAL KNEE ARTHROPLASTY;  Surgeon: Jim Hu M.D.;  Location: [facility];  Service: Orthopedics    ARTHROPLASTY      DENTAL EXTRACTION(S)      wisdom teeth removed at age 18    OTHER      mohs to nose, back     SOCHX:   Social History     Socioeconomic History    Marital status:     Highest education level: Bachelor's degree (e.g., BA, AB, BS)   Tobacco Use    Smoking status: Never    Smokeless tobacco: " "Never   Vaping Use    Vaping Use: Never used   Substance and Sexual Activity    Alcohol use: Yes     Alcohol/week: 8.4 oz     Types: 7 Glasses of wine, 7 Standard drinks or equivalent per week     Comment: 2 glasses wine/night    Drug use: No    Sexual activity: Yes     Partners: Female     Social Determinants of Health     Financial Resource Strain: Low Risk     Difficulty of Paying Living Expenses: Not hard at all   Food Insecurity: No Food Insecurity    Worried About Running Out of Food in the Last Year: Never true    Ran Out of Food in the Last Year: Never true   Transportation Needs: No Transportation Needs    Lack of Transportation (Medical): No    Lack of Transportation (Non-Medical): No   Physical Activity: Sufficiently Active    Days of Exercise per Week: 5 days    Minutes of Exercise per Session: 60 min   Stress: No Stress Concern Present    Feeling of Stress : Not at all   Social Connections: Moderately Integrated    Frequency of Communication with Friends and Family: More than three times a week    Frequency of Social Gatherings with Friends and Family: More than three times a week    Attends Voodoo Services: Never    Active Member of Clubs or Organizations: Yes    Attends Club or Organization Meetings: More than 4 times per year    Marital Status:    Housing Stability: Low Risk     Unable to Pay for Housing in the Last Year: No    Number of Places Lived in the Last Year: 1    Unstable Housing in the Last Year: No     FH:   Family History   Problem Relation Age of Onset    Cancer Mother         bladder non-smoker    Dementia Father     Cancer Brother         prostate age 52         Objective:   BP (!) 148/80   Pulse 96   Temp 36.8 °C (98.3 °F) (Temporal)   Resp 18   Ht 1.956 m (6' 5\")   Wt 104 kg (230 lb)   SpO2 97%   BMI 27.27 kg/m²     Physical Exam  Vitals and nursing note reviewed.   Constitutional:       Appearance: Normal appearance.   HENT:      Head: Normocephalic and atraumatic. "      Right Ear: External ear normal.      Left Ear: Drainage present. No swelling or tenderness.      Ears:        Comments: MOS procedure wound. No surrounding erythema. Moderate bleeding from wound when bandage was removed.     Nose: Nose normal. No congestion or rhinorrhea.      Mouth/Throat:      Mouth: Mucous membranes are moist.   Eyes:      Extraocular Movements: Extraocular movements intact.      Pupils: Pupils are equal, round, and reactive to light.   Cardiovascular:      Rate and Rhythm: Normal rate and regular rhythm.      Heart sounds: Normal heart sounds.   Pulmonary:      Effort: Pulmonary effort is normal.      Breath sounds: Normal breath sounds.   Abdominal:      General: Abdomen is flat.   Musculoskeletal:         General: No swelling or tenderness.      Cervical back: Normal range of motion and neck supple.   Skin:     General: Skin is warm and dry.      Findings: No rash.   Neurological:      General: No focal deficit present.      Mental Status: He is alert and oriented to person, place, and time. Mental status is at baseline.   Psychiatric:         Mood and Affect: Mood normal.         Behavior: Behavior normal.         Thought Content: Thought content normal.         Judgment: Judgment normal.       Assessment/Plan:   Assessment      1. Open wound of left ear, unspecified open wound type, initial encounter    Bandage removed from patient's left ear.  Once the bandage was removed, small amount of blood was visualized. Wound bed cleansed with normal saline and assessed.  No erythema, drainage, swelling. Wound bed is well approximated.  Light pressure applied to ear to stop the bleeding.  Polysporin applied to wound bed.  Clean bandage placed.  Patient tolerated procedure well.  Educated patient about wound care.  Advised patient to follow-up with dermatology.    AVS handout given and reviewed with patient. Patient educated on red flags and when to seek treatment back in ER or UC.     I  personally reviewed prior external notes and test results pertinent to today's visit.  I have independently reviewed and interpreted all diagnostics ordered during this urgent care visit.     This dictation has been created using voice recognition software. The accuracy of the dictation is limited by the abilities of the software. I expect there may be some errors of grammar and possibly content. I made every attempt to manually correct the errors within my dictation. However, errors related to voice recognition software may still exist and should be interpreted within the appropriate context.    This note was electronically signed by SOPHIA House

## 2023-03-23 ENCOUNTER — OFFICE VISIT (OUTPATIENT)
Dept: DERMATOLOGY | Facility: IMAGING CENTER | Age: 73
End: 2023-03-23
Payer: MEDICARE

## 2023-03-23 VITALS
DIASTOLIC BLOOD PRESSURE: 84 MMHG | TEMPERATURE: 97.2 F | SYSTOLIC BLOOD PRESSURE: 162 MMHG | HEIGHT: 77 IN | BODY MASS INDEX: 27.16 KG/M2 | WEIGHT: 230 LBS

## 2023-03-23 DIAGNOSIS — C44.519 BCC (BASAL CELL CARCINOMA), CHEST: ICD-10-CM

## 2023-03-23 PROCEDURE — 12032 INTMD RPR S/A/T/EXT 2.6-7.5: CPT | Performed by: DERMATOLOGY

## 2023-03-23 PROCEDURE — 11603 EXC TR-EXT MAL+MARG 2.1-3 CM: CPT | Performed by: DERMATOLOGY

## 2023-03-23 ASSESSMENT — FIBROSIS 4 INDEX: FIB4 SCORE: 0.99

## 2023-03-23 NOTE — PROGRESS NOTES
"PROCEDURE NOTE:    MALIGNANT LESION - WIDE LOCAL EXCISION    After patient received diagnosis of basal cell carcinoma, nodular type, further management was discussed, including wide local excision vs other. Patient opted for wide local excision. Risks, benefits and alternatives of procedure, including, but not limited to scar, bleeding, pain, infection, nerve damage, recurrence of tumor, failed surgery, and need for further surgery, were discussed and written informed consent obtained. Correct site was verified by patient and myself, and verbal time out completed.     Allergies reviewed: Yes  Pacemaker/defibrillator: No  Artificial joints: Yes Dec 2022  Antibiotics given: Yes pt did not take antibiotics but given in clinic (see note below)  Blood thinners/anticoagulants: Yes    Pre-op diagnosis: basal cell carcinoma, nodular type  Post-op diagnosis: Same  Site:chest  Pre-op size: 1.4 X 1cm + 4mm margin = 2.2X1.8 cm  Post-op Antibiotics: no    BP (!) 162/84   Temp 36.2 °C (97.2 °F)   Ht 1.956 m (6' 5\")   Wt 104 kg (230 lb)     Procedure: Area of surgery was prepped with alcohol, marked with 4 mm margins, and with sterile marking pen. Anesthesia with 1% lidocaine with epinephrine administered with 30 gauge needle. The area was again cleaned with chlorhexidine swab. With sterile technique, a 15 blade scalpel was used to make an elliptical incision around the tumor to the level of the subcutaneous fat. The tumor was removed. Bleeding was minimal, and hemostasis was achieved with pressure, hyfrecation. Specimen was placed into biopsy container and sent to pathology by staff.    Intermediate Closure:  Buried vertical mattress sutures were placed with 4.0 monocryl to close dead space. 4.0 prolene superficial running sutures were placed to approximate wound edge.  Vaseline applied to wound with bandage. Patient tolerated procedure well and there were no complications, blood loss was minimal.     Final wound size: " "5cm    Bandage was placed with vaseline, telfa, gauze and tape. Wound care was discussed with the patient, and written instructions were provided. Patient to return to clinic in 10-14 days for suture removal. Patient to call us if any problems or concerns with the procedure site arise prior to scheduled appointment.     Additional follow-up will be planned for 3-6 months for total skin exam    Deja Nicholson MD      Called [facility] Board [facility] Pharmacy to determine whether MD could dispense Keflex to patient in office ahead of skin surgery procedure, as patient had forgotten to take his medication. Unfortunately, when transferred to connection who could answer question, [facility]  Keflex not a controlled substance and MD would not consider it a \"dangerous drug\".  There is a designated, licensed NP with dispensing license who was not in office at time of procedure.  Patient given options to defer procedure to another day/time, drive to house to take medications previously acquired from pharmacy, have someone from house drive medication to clinic (if available) and / or take AFTER procedure.  Decision made that safest course would be for patient to take 4 Keflex 500mg tabs (total dose Keflex 2 grams PO X 1)  ahead of skin surgery.  In setting of not being able to reach person at Board of Pharmacy to answer point-of-care clinic question about adherence to statutes for MD to dispense what would likely not be considered a \"dangerous drug\", 4 keflex 500mg capsules given to patient from clinic supply blister pack.     Med lot: [id number]  Med expiration: Aug 2025    Given @1:17pm    Surgery proceeded at 2:10 pm    "

## 2023-03-29 DIAGNOSIS — G20.A1 PARKINSON'S DISEASE (HCC): ICD-10-CM

## 2023-03-30 RX ORDER — RASAGILINE 1 MG/1
1 TABLET ORAL
Qty: 90 TABLET | Refills: 3 | Status: SHIPPED | OUTPATIENT
Start: 2023-03-30 | End: 2024-03-26

## 2023-03-30 NOTE — TELEPHONE ENCOUNTER
Received request via: Pharmacy    Was the patient seen in the last year in this department? Yes    Does the patient have an active prescription (recently filled or refills available) for medication(s) requested? Yes.     Does the patient have [facility] Plus and need 100 day supply (blood pressure, diabetes and cholesterol meds only)? Medication is not for cholesterol, blood pressure or diabetes

## 2023-04-03 ENCOUNTER — NON-PROVIDER VISIT (OUTPATIENT)
Dept: DERMATOLOGY | Facility: IMAGING CENTER | Age: 73
End: 2023-04-03
Payer: MEDICARE

## 2023-04-03 NOTE — PROGRESS NOTES
Gopal Mallory JrMallika is a 72 y.o. male here for a Non-Provider Visit for Suture Removal.    Sutures were placed by Dr. Nicholson on date: 03/23/2023  Skin is healed: Yes  Provider notified if skin is not healed, or if there is redness, heat, pain, or drainage from incision: N\A  Sutures removed.   Mastisol and steristips are placed: Yes    Advised to use emollient (vaseline, aquaphor, etc.) as needed, avoid peroxide and antibiotic ointment to reduce irritation.     Path report has been reviewed by provider.  Path report has reviewed with patient.  In Clinic

## 2023-04-11 ENCOUNTER — HOSPITAL ENCOUNTER (OUTPATIENT)
Dept: LAB | Facility: MEDICAL CENTER | Age: 73
End: 2023-04-11
Attending: INTERNAL MEDICINE
Payer: MEDICARE

## 2023-04-11 DIAGNOSIS — I10 ESSENTIAL HYPERTENSION: Chronic | ICD-10-CM

## 2023-04-11 LAB
BASOPHILS # BLD AUTO: 0.6 % (ref 0–1.8)
BASOPHILS # BLD: 0.03 K/UL (ref 0–0.12)
CHOLEST SERPL-MCNC: 171 MG/DL (ref 100–199)
EOSINOPHIL # BLD AUTO: 0.07 K/UL (ref 0–0.51)
EOSINOPHIL NFR BLD: 1.5 % (ref 0–6.9)
ERYTHROCYTE [DISTWIDTH] IN BLOOD BY AUTOMATED COUNT: 41.1 FL (ref 35.9–50)
HCT VFR BLD AUTO: 45.9 % (ref 42–52)
HDLC SERPL-MCNC: 51 MG/DL
HGB BLD-MCNC: 15.2 G/DL (ref 14–18)
IMM GRANULOCYTES # BLD AUTO: 0.01 K/UL (ref 0–0.11)
IMM GRANULOCYTES NFR BLD AUTO: 0.2 % (ref 0–0.9)
LDLC SERPL CALC-MCNC: 100 MG/DL
LYMPHOCYTES # BLD AUTO: 1.47 K/UL (ref 1–4.8)
LYMPHOCYTES NFR BLD: 31.7 % (ref 22–41)
MCH RBC QN AUTO: 28.3 PG (ref 27–33)
MCHC RBC AUTO-ENTMCNC: 33.1 G/DL (ref 33.7–35.3)
MCV RBC AUTO: 85.5 FL (ref 81.4–97.8)
MONOCYTES # BLD AUTO: 0.38 K/UL (ref 0–0.85)
MONOCYTES NFR BLD AUTO: 8.2 % (ref 0–13.4)
NEUTROPHILS # BLD AUTO: 2.67 K/UL (ref 1.82–7.42)
NEUTROPHILS NFR BLD: 57.8 % (ref 44–72)
NRBC # BLD AUTO: 0 K/UL
NRBC BLD-RTO: 0 /100 WBC
PLATELET # BLD AUTO: 201 K/UL (ref 164–446)
PMV BLD AUTO: 9.5 FL (ref 9–12.9)
RBC # BLD AUTO: 5.37 M/UL (ref 4.7–6.1)
TRIGL SERPL-MCNC: 98 MG/DL (ref 0–149)
WBC # BLD AUTO: 4.6 K/UL (ref 4.8–10.8)

## 2023-04-11 PROCEDURE — 85025 COMPLETE CBC W/AUTO DIFF WBC: CPT

## 2023-04-11 PROCEDURE — 84443 ASSAY THYROID STIM HORMONE: CPT

## 2023-04-11 PROCEDURE — 36415 COLL VENOUS BLD VENIPUNCTURE: CPT

## 2023-04-11 PROCEDURE — 80061 LIPID PANEL: CPT

## 2023-04-12 LAB — TSH SERPL DL<=0.005 MIU/L-ACNC: 2.06 UIU/ML (ref 0.38–5.33)

## 2023-05-18 RX ORDER — TRIAMCINOLONE ACETONIDE 1 MG/G
CREAM TOPICAL
Qty: 454 G | Refills: 0 | Status: SHIPPED | OUTPATIENT
Start: 2023-05-18 | End: 2024-02-01

## 2023-05-19 DIAGNOSIS — G20.A1 PARKINSON'S DISEASE (HCC): ICD-10-CM

## 2023-05-19 NOTE — TELEPHONE ENCOUNTER
Received request via: Patient    Was the patient seen in the last year in this department? No    Does the patient have an active prescription (recently filled or refills available) for medication(s) requested? Yes.     Does the patient have [facility] Plus and need 100 day supply (blood pressure, diabetes and cholesterol meds only)? Medication is not for cholesterol, blood pressure or diabetes    PATIENT STATES HE IS ALMOST OUT OF MEDICATION AND HE NOW TAKES 3 TABLETS INSTEAD OF TWO

## 2023-05-22 ENCOUNTER — TELEPHONE (OUTPATIENT)
Dept: NEUROLOGY | Facility: MEDICAL CENTER | Age: 73
End: 2023-05-22
Payer: MEDICARE

## 2023-05-22 NOTE — TELEPHONE ENCOUNTER
Carbidopa-Levodopa  MG Tabs    RTS - NEXT AVAILABLE FILL DATE [id number] LAST FILL DT [id number] FILLED AT PHARMACY [facility] [id number],PHONE #[phone] NON-SPECIALTY DRUG [id number] - 06/22/2023 8:44am

## 2023-07-27 ENCOUNTER — OFFICE VISIT (OUTPATIENT)
Dept: DERMATOLOGY | Facility: IMAGING CENTER | Age: 73
End: 2023-07-27
Payer: MEDICARE

## 2023-07-27 DIAGNOSIS — Z12.83 SKIN CANCER SCREENING: ICD-10-CM

## 2023-07-27 DIAGNOSIS — L90.8 SKIN AGING: ICD-10-CM

## 2023-07-27 DIAGNOSIS — D22.9 NEVUS: ICD-10-CM

## 2023-07-27 DIAGNOSIS — L81.4 LENTIGO: ICD-10-CM

## 2023-07-27 DIAGNOSIS — Z85.89 HISTORY OF SQUAMOUS CELL CARCINOMA: ICD-10-CM

## 2023-07-27 DIAGNOSIS — L11.1 GROVER'S DISEASE: ICD-10-CM

## 2023-07-27 DIAGNOSIS — L82.1 SEBORRHEIC KERATOSIS: ICD-10-CM

## 2023-07-27 DIAGNOSIS — Z85.828 HISTORY OF BASAL CELL CARCINOMA: ICD-10-CM

## 2023-07-27 DIAGNOSIS — L57.0 ACTINIC KERATOSIS: ICD-10-CM

## 2023-07-27 DIAGNOSIS — L21.9 SEBORRHEA: ICD-10-CM

## 2023-07-27 PROCEDURE — 17003 DESTRUCT PREMALG LES 2-14: CPT | Performed by: DERMATOLOGY

## 2023-07-27 PROCEDURE — 17000 DESTRUCT PREMALG LESION: CPT | Performed by: DERMATOLOGY

## 2023-07-27 PROCEDURE — 99214 OFFICE O/P EST MOD 30 MIN: CPT | Mod: 25 | Performed by: DERMATOLOGY

## 2023-07-27 NOTE — PROGRESS NOTES
"  CC: Follow up CLEVE       Subjective: Previously seen patient here for CLEVE.      areas of concern rt check, red and gets irritated when shaving,   get crusty lesion on rt ear, and bleeds, better today and better after last LN2 trx  crusty on nose might be seborrhea told previously uses dandruff shampoo not much help  Rt wrist lesion gets worse with sun.   Poss rash on lt leg that gets itchy started while  out on golf, applied otc after bite thought it might be bug bites.      From prior notes:  \"Left groin - hx of staph and jock itch. Desires eval. 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 BCC excision March 2023 chest. Mohs left ear March 2023  History of blistering/severe sunburns:Yes, Details: during youth  Family history of skin cancer:Yes, Details: father type unknown   Family history of atypical moles:No\"    ROS: no fevers/chills. No itch.  No cough.  DermPMH: mult past NMSC  Relevant PMH: mult med comorbidities  Social: never smoker    PE: Gen:WDWN male in NAD. Skin: Scalp/face/eyes/lips/neck/chest/back/arms/legs/hands/feet/buttocks - examined.  Genitals exam declined  -scaling on scalp and NL folds/ears  -gritty papules on forehead, temples and cheeks  -thin HK papules on nose X 2 and r wrist X 1  -scar healed on chest  -scattered hyperpigmented macules/papules, appearing benign on torso and extremities  -crusted papules scattered on lower abdomen  -scar on left knee  -xerosis of lower extremities without rashes noted    A/P: AKs: nose and r wrist  AKs:  -counseled on diagnosis and treatment, including risks/benefits/alternatives of cyrotherapy  -LN2 25 sec X 2 cycles X 3lesions  -f/u if persists at 1 month  Advised eval winter 2023/24 for efudex/PDT    Hx of skin cancer: SCCIS - free margins, right arm, " observing. F/u future visit.  Has declined ED&C, excision. NER TODAY  -cont'd sunprotection and skin cancer surveillance  -Q 6mo-annual exam recommended; f/u suspicious lesions PRN  -rec nicotinamide OTC 500mg PO BID    Nevi: benign appearing:  -Reviewed skin cancer detection/prevention  -RTC PRN growth/changes/concerning features    Lentigos/SKs: benign  -reassurance  -reviewed skin cancer detection/prevention    Landon's dz: chronic stable  -TAC 0.1% cream BID PRN rf can be rx when MD messaged    Xerosis:   -OTC moisturizer recommended    Seborrhea, scalp:chronic stable  -advised OTC antidandruff shampoos and can message MD PRN topical steroid solution needs    Seborrhea, face: chronic worsening  -counseled re: dx/tx  -antidandruff shampoos - pick 2 and alternate Q3-4 days   -OTC HCT + OTC lotrimin cream BID PRN      F/u 6 months    I have reviewed medications relevant to my specialty.

## 2023-09-13 DIAGNOSIS — Z86.79 S/P ABLATION OF ATRIAL FIBRILLATION: ICD-10-CM

## 2023-09-13 DIAGNOSIS — Z98.890 S/P ABLATION OF ATRIAL FIBRILLATION: ICD-10-CM

## 2023-09-13 DIAGNOSIS — G20.A1 PARKINSON'S DISEASE (HCC): ICD-10-CM

## 2023-09-13 RX ORDER — OMEPRAZOLE 20 MG/1
20 CAPSULE, DELAYED RELEASE ORAL DAILY
Qty: 90 CAPSULE | Refills: 3 | Status: SHIPPED | OUTPATIENT
Start: 2023-09-13

## 2023-09-13 NOTE — TELEPHONE ENCOUNTER
Received request via: Pharmacy    Was the patient seen in the last year in this department? No, Pts last appt was 3/25/22    Does the patient have an active prescription (recently filled or refills available) for medication(s) requested? No    Does the patient have FDC Plus and need 100 day supply (blood pressure, diabetes and cholesterol meds only)? Medication is not for cholesterol, blood pressure or diabetes      Please advise:

## 2023-10-23 ENCOUNTER — TELEPHONE (OUTPATIENT)
Dept: CARDIOLOGY | Facility: MEDICAL CENTER | Age: 73
End: 2023-10-23
Payer: MEDICARE

## 2023-10-23 NOTE — TELEPHONE ENCOUNTER
"Last OV: 12/20/2022  Proposed Surgery: Lumbar Epidural Steroid Injection  Surgery Date: No date stated   Requesting Office Name: [facility]   Fax Number: [phone]  Preference of Location (default is surgery center unless specified by Cardiologist or [facility])  Prior Clearance Addressed: No      Anticoags/Antiplatelets: Apixaban   Outstanding Cardiac Imaging : No  Stent, Cardiac Devices, or Catheterization: No  Ablation, TAVR/Valve (including open heart), Cardioversion: Yes  Date: 03/8/2022  >3 months post procedure for dental work request OR >6 months post procedure, send clearance letter  Recent Cardiac Hospitalization: No            When: N/A  History (cardiac history):   Past Medical History:   Diagnosis Date    Arrhythmia     Afib    Arthritis 03/04/2022    Knuckles and knees.    Bowel habit changes 03/04/2022    Constipation    Cancer (HCC)     Basal cell/ Squamous skin    Dental disorder 03/04/2022    Waiting for implant front top tooth.  Tooth pulled a couple of months ago.    GERD (gastroesophageal reflux disease)     Hypertension     Parkinson disease (HCC)     Rash 03/04/2022    \"Jock Rash\"    Snoring     Negative sleep study.             Surgical Clearance Letter Sent: YES   **Scan clearance request letter into [facility].**    "

## 2023-10-23 NOTE — LETTER
PROCEDURE/SURGERY CLEARANCE FORM      Encounter Date: 10/23/2023    Patient: Gopal Mallory Jr.  YOB: 1950    CARDIOLOGIST:  Yobany Gray M.D.    REFERRING DOCTOR:  No ref. provider found    The above patient is cleared to have the following procedure/surgery:  Lumbar Epidural Steroid Injection                                           PROCEDURE/SURGERY CLEARANCE FORM    Date: 10/23/2023   Patient Name: Gopal Mallory Jr.    Dear Surgeon or Proceduralist,      Thank you for your request for cardiac stratification of our mutual patient Gopal Mallory Jr. 1950. We have reviewed their [facility] records; and to the best of our understanding this patient has not had stenting, ablation, cardiothoracic surgery or hospitalization for cardiovascular reasons in the past 6 months.  Gopal Mallory Jr. has been seen within the past 18 months and is considered to have non-modifiable cardiac risk for this low-risk procedure/surgery. They may proceed from a cardiovascular standpoint and may hold their antiplatelet/anticoagulation as briefly as possible. Please have patient resume this medication when hemodynamically stable to do so.     Aspirin or Prasugrel   - hold 7 days prior to procedure/surgery, resume when hemodynamically stable      Clopidrogrel or Ticagrelor  - hold 7 days for all neurological procedures, hold 5 days prior to all other procedure/surgery,  resume when hemodynamically stable     Warfarin - hold 7 days for all neurological procedures, hold 5 days prior to all other procedure/surgery and coordinate with [facility] ([phone]) INR testing and dose management.      Pradaxa/Xarelto/Eliquis/Savesya - hold 1 day prior to procedure for low bleeding risk procedure, 2 days for high bleeding risk procedure, or consider holding 3 days or longer for patients with reduced kidney function (CrCl <30mL/min) or spinal/cranial  surgeries/procedures.      If they have a mechanical heart valve, please coordinate with [facility] ([phone]) the proper management of their anticoagulant in the periprocedural or perioperative period.      Some patients have higher risk for cardiovascular complications or holding medication. If our patient has had prior complications of holding antiplatelet or anticoagulants in the past and we have seen them after these events, we have addressed these concerns with the patient. They are at an unknown degree of increased risk for recurrent complication.  You may hold anticoagulation/antiplatelets for the procedure or surgery if the benefits of the procedure or surgery outweigh this nonmodifiable risk.      If Gopal Mallory JrMallika 1950 has new symptoms of heart failure decompensation, unstable arrythmia, or angina please reach out and we will assess the patient.      If you have other patient-specific concerns, please feel free to reach out to the patient's cardiologist directly at [phone].     Thank you,       [facility]

## 2023-11-06 NOTE — TELEPHONE ENCOUNTER
Yobany Gray M.D.  You 12 minutes ago (3:59 PM)     Low risk to hold Eliquis up to 2 days if needed       no

## 2023-12-14 ENCOUNTER — OFFICE VISIT (OUTPATIENT)
Dept: DERMATOLOGY | Facility: IMAGING CENTER | Age: 73
End: 2023-12-14
Payer: MEDICARE

## 2023-12-14 DIAGNOSIS — L82.1 SEBORRHEIC KERATOSIS: ICD-10-CM

## 2023-12-14 DIAGNOSIS — L21.9 SEBORRHEA: ICD-10-CM

## 2023-12-14 DIAGNOSIS — L01.00 IMPETIGO: ICD-10-CM

## 2023-12-14 PROCEDURE — 99213 OFFICE O/P EST LOW 20 MIN: CPT | Performed by: DERMATOLOGY

## 2023-12-14 RX ORDER — CEPHALEXIN 500 MG/1
CAPSULE ORAL
Qty: 21 CAPSULE | Refills: 0 | Status: SHIPPED | OUTPATIENT
Start: 2023-12-14 | End: 2024-01-05

## 2023-12-14 NOTE — PROGRESS NOTES
"CC:  skin lesion     Subjective: Previously seen patient here for skin lesions    HPI: skin lesion   Location: nose   Time present:  6 months   Painful lesion: Yes  Itching lesion: Yes  Enlarging lesion: Yes  Anything make it better or worse?no     HPI:  skin lesion   Location:  right temple   Time present: 3 months   Painful lesion: No  Itching lesion: No  Enlarging lesion: No  Anything make it better or worse?no   Dark spot - like others on face    Has concerns inside right ear , scabby and blood - looking improved today per patient.     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 BCC excision March 2023 chest. Mohs left ear March 2023  History of blistering/severe sunburns:Yes, Details: during youth  Family history of skin cancer:Yes, Details: father type unknown   Family history of atypical moles:No\"      ROS: no fevers/chills. No itch.  No cough  Relevant PMH:mult med comorbidities  Social:NS    PE: Gen:WDWN male in NAD. Skin: focal exam: head - scattered waxy papules, appearing benign. Right michael = erythema without notable crust/scale or erosion/lesion. Left lateral alae - waxy/honey-colored crusting. Easily debrides to flat, not bleeding base. No erosion.      A/P: consider impetigo, face: aggravated seborrhea, right ear:   -trial mupirocin oint BID-TID X 7 days and keflex 500mg PO TID X 7 days  -f/u 3-4 weeks    Sks, head:   -b/r    Hx of skin cancer:  -cont'd sunprotection and skin cancer surveillance  -Q 6mo-annual exam recommended; f/u suspicious lesions PRN        I have reviewed medications relevant to my specialty.          "

## 2023-12-20 ENCOUNTER — OFFICE VISIT (OUTPATIENT)
Dept: CARDIOLOGY | Facility: MEDICAL CENTER | Age: 73
End: 2023-12-20
Attending: INTERNAL MEDICINE
Payer: MEDICARE

## 2023-12-20 VITALS
OXYGEN SATURATION: 95 % | HEIGHT: 77 IN | RESPIRATION RATE: 12 BRPM | HEART RATE: 58 BPM | WEIGHT: 243 LBS | BODY MASS INDEX: 28.69 KG/M2 | DIASTOLIC BLOOD PRESSURE: 78 MMHG | SYSTOLIC BLOOD PRESSURE: 110 MMHG

## 2023-12-20 DIAGNOSIS — I48.0 PAROXYSMAL ATRIAL FIBRILLATION (HCC): Chronic | ICD-10-CM

## 2023-12-20 PROCEDURE — 93005 ELECTROCARDIOGRAM TRACING: CPT | Performed by: INTERNAL MEDICINE

## 2023-12-20 PROCEDURE — 99214 OFFICE O/P EST MOD 30 MIN: CPT | Performed by: INTERNAL MEDICINE

## 2023-12-20 PROCEDURE — 3078F DIAST BP <80 MM HG: CPT | Performed by: INTERNAL MEDICINE

## 2023-12-20 PROCEDURE — 3074F SYST BP LT 130 MM HG: CPT | Performed by: INTERNAL MEDICINE

## 2023-12-20 PROCEDURE — 99213 OFFICE O/P EST LOW 20 MIN: CPT | Performed by: INTERNAL MEDICINE

## 2023-12-20 ASSESSMENT — FIBROSIS 4 INDEX: FIB4 SCORE: 0.98

## 2023-12-20 NOTE — PROGRESS NOTES
"Arrhythmia Clinic Note (Established patient)    DOS: 12/20/2023    Chief complaint/Reason for consult: Afib    Interval History: 74 y/o M with perAF s/p ablation 2 years ago without recurrence. Doing well no complaints. On Eliquis.     ROS (+ highlighted in bold):  Constitutional: Fevers/chills/fatigue/weightloss  HEENT: Blurry vision/eye pain/sore throat/hearing loss  Respiratory: Shortness of breath/cough  Cardiovascular: Chest pain/palpitations/edema/orthopnea/syncope  GI: Nausea/vomitting/diarrhea  MSK: Arthralgias/myagias/muscle weakness  Skin: Rash/sores  Neurological: Numbness/tremors/vertigo  Endocrine: Excessive thirst/polyuria/cold intolerance/heat intolerance  Psych: Depression/anxiety    Past Medical History:   Diagnosis Date    Arrhythmia     Afib    Arthritis 03/04/2022    Knuckles and knees.    Bowel habit changes 03/04/2022    Constipation    Cancer (HCC)     Basal cell/ Squamous skin    Dental disorder 03/04/2022    Waiting for implant front top tooth.  Tooth pulled a couple of months ago.    GERD (gastroesophageal reflux disease)     Hypertension     Parkinson disease     Rash 03/04/2022    \"Jock Rash\"    Snoring     Negative sleep study.       Past Surgical History:   Procedure Laterality Date    PB TOTAL KNEE ARTHROPLASTY Left 12/05/2022    Procedure: LEFT TOTAL KNEE ARTHROPLASTY;  Surgeon: Jim Hu M.D.;  Location: [facility] [place];  Service: Orthopedics    ARTHROPLASTY      DENTAL EXTRACTION(S)      wisdom teeth removed at age 18    OTHER      mohs to nose, back       Social History     Socioeconomic History    Marital status:      Spouse name: Not on file    Number of children: Not on file    Years of education: Not on file    Highest education level: Bachelor's degree (e.g., BA, AB, BS)   Occupational History    Not on file   Tobacco Use    Smoking status: Never    Smokeless tobacco: Never   Vaping Use    Vaping Use: Never used   Substance and Sexual Activity    " Alcohol use: Yes     Alcohol/week: 8.4 oz     Types: 7 Glasses of wine, 7 Standard drinks or equivalent per week     Comment: 2 glasses wine/night    Drug use: No    Sexual activity: Yes     Partners: Female   Other Topics Concern    Not on file   Social History Narrative    Not on file     Social Determinants of Health     Financial Resource Strain: Low Risk  (1/29/2023)    Overall Financial Resource Strain (CARDIA)     Difficulty of Paying Living Expenses: Not hard at all   Food Insecurity: No Food Insecurity (1/29/2023)    Hunger Vital Sign     Worried About Running Out of Food in the Last Year: Never true     Ran Out of Food in the Last Year: Never true   Transportation Needs: No Transportation Needs (1/29/2023)    PRAPARE - Transportation     Lack of Transportation (Medical): No     Lack of Transportation (Non-Medical): No   Physical Activity: Sufficiently Active (1/29/2023)    Exercise Vital Sign     Days of Exercise per Week: 5 days     Minutes of Exercise per Session: 60 min   Stress: No Stress Concern Present (1/29/2023)    [place] [employer] - Occupational Stress Questionnaire     Feeling of Stress : Not at all   Social Connections: Moderately Integrated (1/29/2023)    Social Connection and Isolation Panel [NHANES]     Frequency of Communication with Friends and Family: More than three times a week     Frequency of Social Gatherings with Friends and Family: More than three times a week     Attends [employer] Services: Never     Active Member of Clubs or Organizations: Yes     Attends Club or Organization Meetings: More than 4 times per year     Marital Status:    Intimate Partner Violence: Not on file   Housing Stability: Low Risk  (1/29/2023)    Housing Stability Vital Sign     Unable to Pay for Housing in the Last Year: No     Number of Places Lived in the Last Year: 1     Unstable Housing in the Last Year: No       Family History   Problem Relation Age of Onset    Cancer  "Mother         bladder non-smoker    Dementia Father     Cancer Brother         prostate age 52       Allergies   Allergen Reactions    Molds & Smuts Unspecified     Sneezing       Current Outpatient Medications   Medication Sig Dispense Refill    omeprazole (PRILOSEC) 20 MG delayed-release capsule TAKE 1 CAPSULE BY MOUTH EVERY DAY. TAKE EVERY MORNING FOR 30 DAYS POST ABLATION TO PROTECT YOUR ESOPHAGUS. 90 Capsule 3    carbidopa-levodopa (SINEMET)  MG Tab TAKE 1 TABLET BY MOUTH THREE TIMES A  Tablet 1    apixaban (ELIQUIS) 5mg Tab TAKE 1 TABLET BY MOUTH TWICE A  Tablet 2    valsartan (DIOVAN) 80 MG Tab Take 0.5 Tablets by mouth every day. 45 Tablet 3    triamcinolone acetonide (KENALOG) 0.1 % Cream APPLY TO AFFECTED AREA CHEST/BACK/ABDOMEN TWICE A DAY AS NEEDED RASH. AVOID USE FACE, AXILLA, GROIN 454 g 0    rasagiline (AZILECT) 1 MG Tab TAKE 1 TABLET BY MOUTH EVERY DAY 90 Tablet 3    fluticasone (FLONASE) 50 MCG/ACT nasal spray Spray 1 Spray in nose 1 time daily as needed (ALLERGY).      cephALEXin (KEFLEX) 500 MG Cap Take 1 pill PO TID X 7 days 21 Capsule 0    mupirocin (BACTROBAN) 2 % Ointment AAA left nose and right ear TID X 7 days 22 g 1    tizanidine (ZANAFLEX) 4 MG Tab Take 1 Tablet by mouth every 8 hours as needed (muscle spasms). 30 Tablet 0    methylPREDNISolone (MEDROL DOSEPAK) 4 MG Tablet Therapy Pack Follow schedule on package instructions. 21 Tablet 0     No current facility-administered medications for this visit.       Physical Exam:  Vitals:    12/20/23 1115   BP: 110/78   BP Location: Left arm   Patient Position: Sitting   BP Cuff Size: Adult   Pulse: (!) 58   Resp: 12   SpO2: 95%   Weight: 110 kg (243 lb)   Height: 1.956 m (6' 5\")     General appearance: NAD, conversant   Eyes: anicteric sclerae, moist conjunctivae; no lid-lag; PERRLA  HENT: Atraumatic; oropharynx clear with moist mucous membranes and no mucosal ulcerations; normal hard and soft palate  Neck: Trachea midline; " "FROM, supple, no thyromegaly or lymphadenopathy  Lungs: CTA, with normal respiratory effort and no intercostal retractions  CV: RRR, no MRGs, no JVD  Abdomen: Soft, non-tender; no masses or HSM  Extremities: No peripheral edema or extremity lymphadenopathy  Skin: Normal temperature, turgor and texture; no rash, ulcers or subcutaneous nodules  Psych: Appropriate affect, alert and oriented to person, place and time    Data:  Lipids:   Lab Results   Component Value Date/Time    CHOLSTRLTOT 171 04/11/2023 08:54 AM    TRIGLYCERIDE 98 04/11/2023 08:54 AM    HDL 51 04/11/2023 08:54 AM     (H) 04/11/2023 08:54 AM        BMP:  Lab Results   Component Value Date/Time    SODIUM 140 03/04/2022 1058    POTASSIUM 4.7 03/04/2022 1058    CHLORIDE 103 03/04/2022 1058    CO2 29 03/04/2022 1058    GLUCOSE 102 (H) 03/04/2022 1058    BUN 17 03/04/2022 1058    CREATININE 1.04 03/04/2022 1058    CALCIUM 9.2 03/04/2022 1058    ANION 8.0 03/04/2022 1058        TSH:   Lab Results   Component Value Date/Time    TSHULTRASEN 2.060 04/11/2023 0854        THYROXINE (T4):   No results found for: \"FREEDIR\"     CBC:   Lab Results   Component Value Date/Time    WBC 4.6 (L) 04/11/2023 08:54 AM    RBC 5.37 04/11/2023 08:54 AM    HEMOGLOBIN 15.2 04/11/2023 08:54 AM    HEMATOCRIT 45.9 04/11/2023 08:54 AM    MCV 85.5 04/11/2023 08:54 AM    MCH 28.3 04/11/2023 08:54 AM    MCHC 33.1 (L) 04/11/2023 08:54 AM    RDW 41.1 04/11/2023 08:54 AM    PLATELETCT 201 04/11/2023 08:54 AM    MPV 9.5 04/11/2023 08:54 AM    NEUTSPOLYS 57.80 04/11/2023 08:54 AM    LYMPHOCYTES 31.70 04/11/2023 08:54 AM    MONOCYTES 8.20 04/11/2023 08:54 AM    EOSINOPHILS 1.50 04/11/2023 08:54 AM    BASOPHILS 0.60 04/11/2023 08:54 AM    IMMGRAN 0.20 04/11/2023 08:54 AM    NRBC 0.00 04/11/2023 08:54 AM    NEUTS 2.67 04/11/2023 08:54 AM    LYMPHS 1.47 04/11/2023 08:54 AM    MONOS 0.38 04/11/2023 08:54 AM    EOS 0.07 04/11/2023 08:54 AM    BASO 0.03 04/11/2023 08:54 AM    IMMGRANAB 0.01 " 04/11/2023 08:54 AM    NRBCAB 0.00 04/11/2023 08:54 AM        CBC w/o DIFF  Lab Results   Component Value Date/Time    WBC 4.6 (L) 04/11/2023 08:54 AM    RBC 5.37 04/11/2023 08:54 AM    HEMOGLOBIN 15.2 04/11/2023 08:54 AM    MCV 85.5 04/11/2023 08:54 AM    MCH 28.3 04/11/2023 08:54 AM    MCHC 33.1 (L) 04/11/2023 08:54 AM    RDW 41.1 04/11/2023 08:54 AM    MPV 9.5 04/11/2023 08:54 AM       Prior echo/stress reviewed: EF 60%      EKG interpreted by me: NSR    Impression/Plan:  1. Paroxysmal atrial fibrillation (HCC)  EKG    Comp Metabolic Panel    CBC WITHOUT DIFFERENTIAL        Persistent afib s/p ablation  Hypercoagulable state due to afib    - Continue Eliquis for CVA ppx  - Afib without clinical recurrence    Annual FV    A total of 32 minutes of time was spent on day of encounter reviewing medical record, performing history and examination, counseling, ordering medication/test/consults, collaborating with referring service, and documentation.      Yobany Gray MD  Cardiac Electrophysiology

## 2023-12-23 LAB — EKG IMPRESSION: NORMAL

## 2023-12-23 PROCEDURE — 93010 ELECTROCARDIOGRAM REPORT: CPT | Performed by: INTERNAL MEDICINE

## 2024-01-05 ENCOUNTER — OFFICE VISIT (OUTPATIENT)
Dept: DERMATOLOGY | Facility: IMAGING CENTER | Age: 74
End: 2024-01-05
Payer: MEDICARE

## 2024-01-05 DIAGNOSIS — L82.1 SEBORRHEIC KERATOSIS: ICD-10-CM

## 2024-01-05 DIAGNOSIS — L21.9 SEBORRHEA: ICD-10-CM

## 2024-01-05 DIAGNOSIS — L01.00 IMPETIGO: ICD-10-CM

## 2024-01-05 PROCEDURE — 99213 OFFICE O/P EST LOW 20 MIN: CPT | Performed by: DERMATOLOGY

## 2024-01-05 NOTE — PROGRESS NOTES
"CC: f/u rash    Subjective: Previously seen patient here for followup rash on face.     Reports use of keflex and mupirocin with symptoms relieved on left nose - nearly cleared. Spot in right ear, periodically picks scale from ear with occ bleeding noted. Not growing/changing with time - present X 2 years or more.     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 BCC excision March 2023 chest. Mohs left ear March 2023  History of blistering/severe sunburns:Yes, Details: during youth  Family history of skin cancer:Yes, Details: father type unknown   Family history of atypical moles:No\"      ROS: no fevers/chills. No itch.  No cough  Relevant PMH:mult med comorbidities. parkinsons  Social:NS    PE: Gen:WDWN male in NAD. Skin: focal exam: Right michael = light scale present-hemorrhagic crusted in appearance without notable telangiectasias, rolled border or notable friability/exophytic/endophytic component. Left lateral alae - verrucous papule/waxy in appearance affected NAL fold No erosion. Waxy papules/plaques on back, appearing benign      A/P: consider impetigo, face: resolved today with underlying VK visible  -periodic repeat mupirocin topical application BID X7 days PRN    aggravated seborrhea, right ear: cannot exclude AK  -declined LN2 today  -advised cessation of scale picking.   -periodic repeat mupirocin oint BID-TID X 7 days   -f/u PRN worsening/growing/changing    Sks, back  -b/r    Hx of skin cancer:  -cont'd sunprotection and skin cancer surveillance  -Q 6mo-annual exam recommended; f/u suspicious lesions PRN        I have reviewed medications relevant to my specialty.          "

## 2024-01-30 ENCOUNTER — OFFICE VISIT (OUTPATIENT)
Dept: MEDICAL GROUP | Facility: MEDICAL CENTER | Age: 74
End: 2024-01-30
Payer: MEDICARE

## 2024-01-30 VITALS
SYSTOLIC BLOOD PRESSURE: 120 MMHG | WEIGHT: 244.71 LBS | HEIGHT: 77 IN | BODY MASS INDEX: 28.89 KG/M2 | OXYGEN SATURATION: 96 % | HEART RATE: 66 BPM | TEMPERATURE: 97.6 F | DIASTOLIC BLOOD PRESSURE: 84 MMHG

## 2024-01-30 DIAGNOSIS — Z00.00 MEDICARE ANNUAL WELLNESS VISIT, SUBSEQUENT: ICD-10-CM

## 2024-01-30 DIAGNOSIS — I10 ESSENTIAL HYPERTENSION: Chronic | ICD-10-CM

## 2024-01-30 DIAGNOSIS — E55.9 VITAMIN D DEFICIENCY: ICD-10-CM

## 2024-01-30 DIAGNOSIS — D68.69 SECONDARY HYPERCOAGULABLE STATE (HCC): ICD-10-CM

## 2024-01-30 DIAGNOSIS — G20.A1 PARKINSON'S DISEASE (HCC): ICD-10-CM

## 2024-01-30 DIAGNOSIS — G20.A1 PARKINSON'S DISEASE, UNSPECIFIED WHETHER DYSKINESIA PRESENT, UNSPECIFIED WHETHER MANIFESTATIONS FLUCTUATE (HCC): ICD-10-CM

## 2024-01-30 DIAGNOSIS — I48.0 PAROXYSMAL ATRIAL FIBRILLATION (HCC): Chronic | ICD-10-CM

## 2024-01-30 DIAGNOSIS — Z12.5 PROSTATE CANCER SCREENING: ICD-10-CM

## 2024-01-30 PROCEDURE — 3074F SYST BP LT 130 MM HG: CPT | Performed by: INTERNAL MEDICINE

## 2024-01-30 PROCEDURE — G0439 PPPS, SUBSEQ VISIT: HCPCS | Performed by: INTERNAL MEDICINE

## 2024-01-30 PROCEDURE — 3079F DIAST BP 80-89 MM HG: CPT | Performed by: INTERNAL MEDICINE

## 2024-01-30 ASSESSMENT — ENCOUNTER SYMPTOMS: GENERAL WELL-BEING: GOOD

## 2024-01-30 ASSESSMENT — FIBROSIS 4 INDEX: FIB4 SCORE: 0.98

## 2024-01-30 ASSESSMENT — ACTIVITIES OF DAILY LIVING (ADL): BATHING_REQUIRES_ASSISTANCE: 0

## 2024-01-30 ASSESSMENT — PATIENT HEALTH QUESTIONNAIRE - PHQ9: CLINICAL INTERPRETATION OF PHQ2 SCORE: 0

## 2024-01-30 NOTE — PROGRESS NOTES
HPI: Gopal Mallory Jr. is a 73 y.o. here for Medicare Annual Wellness Visit.   Problem   Medicare Annual Wellness Visit, Subsequent    She is here for Medicare wellness visit, he is feeling well and denies active complaints.     Secondary Hypercoagulable State (Hcc)    Secondary to atrial fibrillation.  Anticoagulated with Eliquis 5 mg tablet twice a day.      Parkinson's Disease    This is a chronic condition, managed by neurology. Dx in 2020   Currently patient is on Sinemet  mg and rasagiline 1 mg daily.     Paroxysmal Atrial Fibrillation (Hcc)    This is a chronic condition, currently managed by cardiology Dr. Yobany Gray.  Echo 4/9/19 normal ejection fraction 60%, mild LVH, mild RV enlargement with no significant tricuspid regurg, normal left atrium, trace MR.  S/p ablation.     Stable, no recent paroxysms.        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    Osteoarthritis of left knee 09/28/2022    Secondary hypercoagulable state (HCC) 01/06/2022    Effusion of left elbow 10/20/2021    Long term (current) use of anticoagulants 08/05/2021    Parkinson's disease 04/08/2020    Gastroesophageal reflux disease without esophagitis 01/21/2020    Family history of prostate cancer 04/22/2019    Impaired fasting glucose 03/11/2019    Paroxysmal atrial fibrillation (HCC) 02/11/2019    History of basal cell carcinoma (BCC) of skin 02/11/2019    History of varicocele 02/11/2019    Essential hypertension 01/15/2019     Current Outpatient Medications   Medication Sig Dispense Refill    omeprazole (PRILOSEC) 20 MG delayed-release capsule TAKE 1 CAPSULE BY MOUTH EVERY DAY. TAKE EVERY MORNING FOR 30 DAYS POST ABLATION TO PROTECT YOUR ESOPHAGUS. 90 Capsule 3    carbidopa-levodopa (SINEMET)  MG Tab TAKE 1 TABLET BY MOUTH THREE TIMES A  Tablet 1    apixaban (ELIQUIS) 5mg  Tab TAKE 1 TABLET BY MOUTH TWICE A  Tablet 2    valsartan (DIOVAN) 80 MG Tab Take 0.5 Tablets by mouth every day. 45 Tablet 3    triamcinolone acetonide (KENALOG) 0.1 % Cream APPLY TO AFFECTED AREA CHEST/BACK/ABDOMEN TWICE A DAY AS NEEDED RASH. AVOID USE FACE, AXILLA, GROIN 454 g 0    rasagiline (AZILECT) 1 MG Tab TAKE 1 TABLET BY MOUTH EVERY DAY 90 Tablet 3    fluticasone (FLONASE) 50 MCG/ACT nasal spray Spray 1 Spray in nose 1 time daily as needed (ALLERGY).       No current facility-administered medications for this visit.          Current supplements as per medication list.     Allergies: Molds & smuts    Current social contact/activities: Golf, poker, hiking     He  reports that he has never smoked. He has never used smokeless tobacco. He reports current alcohol use of about 8.4 oz of alcohol per week. He reports that he does not use drugs.  Counseling given: Not Answered      ROS:    Gait: Uses no assistive device  Ostomy: No  Other tubes: No  Amputations: No  Chronic oxygen use: No  Last eye exam: 2023  Wears hearing aids: No   : Denies any urinary leakage during the last 6 months    Screening:    Depression Screening  Little interest or pleasure in doing things?  0 - not at all  Feeling down, depressed , or hopeless? 0 - not at all  Patient Health Questionnaire Score: 0     If depressive symptoms identified deferred to follow up visit unless specifically addressed in assessment and plan.    Interpretation of PHQ-9 Total Score   Score Severity   1-4 No Depression   5-9 Mild Depression   10-14 Moderate Depression   15-19 Moderately Severe Depression   20-27 Severe Depression    Screening for Cognitive Impairment  Do you or any of your friends or family members have any concern about your memory? No  Three Minute Recall (Banana, Sunrise, Chair) 3/3    Jimbo clock face with all 12 numbers and set the hands to show 20 past 8.  Yes    Cognitive concerns identified deferred for follow up unless specifically  addressed in assessment and plan.    Fall Risk Assessment  Has the patient had two or more falls in the last year or any fall with injury in the last year?  No    Safety Assessment  Do you always wear your seatbelt?  Yes  Any changes to home needed to function safely? No  Difficulty hearing.  No  Patient counseled about all safety risks that were identified.    Functional Assessment ADLs  Are there any barriers preventing you from cooking for yourself or meeting nutritional needs?  No.    Are there any barriers preventing you from driving safely or obtaining transportation?  No.    Are there any barriers preventing you from using a telephone or calling for help?  No    Are there any barriers preventing you from shopping?  No.    Are there any barriers preventing you from taking care of your own finances?  No    Are there any barriers preventing you from managing your medications?  No    Are there any barriers preventing you from showering, bathing or dressing yourself? No    Are there any barriers preventing you from doing housework or laundry? No  Are there any barriers preventing you from using the toilet?No  Are you currently engaging in any exercise or physical activity?  Yes.      Self-Assessment of Health  What is your perception of your health? Good  Do you sleep more than six hours a night? Yes  In the past 7 days, how much did pain keep you from doing your normal work? None  Do you spend quality time with family or friends (virtually or in person)? Yes  Do you usually eat a heart healthy diet that constists of a variety of fruits, vegetables, whole grains and fiber? Yes  Do you eat foods high in fat and/or Fast Food more than three times per week? No    Advance Care Planning  Do you have an Advance Directive, Living Will, Durable Power of , or POLST? Yes  Advance Directive   Durable Power of    is not on file - instructed patient to bring in a copy to scan into their chart    Health  Maintenance Summary            Overdue - Hepatitis B Vaccine (Hep B) (3 of 3 - 19+ 3-dose series) Overdue since 11/19/2015 06/16/2015  Imm Admin: Hepatitis B Vaccine (Adol/Adult)    05/19/2015  Imm Admin: Hepatitis B Vaccine (Adol/Adult)              Overdue - COVID-19 Vaccine (4 - 2023-24 season) Overdue since 9/1/2023 11/12/2021  Imm Admin: PFIZER PURPLE CAP SARS-COV-2 VACCINATION (12+)    03/23/2021  Imm Admin: PFIZER PURPLE CAP SARS-COV-2 VACCINATION (12+)    03/02/2021  Imm Admin: PFIZER PURPLE CAP SARS-COV-2 VACCINATION (12+)              Zoster (Shingles) Vaccines (2 of 2) Next due on 3/5/2024      01/09/2024  Imm Admin: Zoster Vaccine Recombinant (RZV) (SHINGRIX)              Colorectal Cancer Screening (Colonoscopy - Every 5 Years) Tentatively due on 8/8/2024 08/08/2019  REFERRAL TO GI FOR COLONOSCOPY    01/01/2016  Colonoscopy (Done - pt states done 2016 due 5 years)              Annual Wellness Visit (Yearly) Next due on 1/30/2025 01/30/2024  Prob Dx: Medicare annual wellness visit, subsequent    01/30/2024  Visit Dx: Medicare annual wellness visit, subsequent    01/30/2024  Level of Service: ANNUAL WELLNESS VISIT-INCLUDES PPPS SUBSEQUE*    01/30/2023  Level of Service: [place] ANNUAL WELLNESS VISIT-INCLUDES PPPS SUBSEQUE*    01/30/2023  Visit Dx: Medicare annual wellness visit, subsequent    Only the first 5 history entries have been loaded, but more history exists.              IMM DTaP/Tdap/Td Vaccine (4 - Td or Tdap) Next due on 1/6/2032 01/06/2022  Imm Admin: Tdap Vaccine    03/31/2010  Imm Admin: Tdap Vaccine    01/01/2010  Imm Admin: Tdap Vaccine              Hepatitis A Vaccine (Hep A) (Series Information) Aged Out      03/13/2018  Imm Admin: Hepatitis A Vaccine, Adult    05/19/2015  Imm Admin: Hepatitis A Vaccine, Adult              Pneumococcal Vaccine: 65+ Years (Series Information) Completed      04/22/2019  Imm Admin: Pneumococcal polysaccharide vaccine (PPSV-23)     12/12/2017  Imm Admin: Pneumococcal Conjugate Vaccine (Prevnar/PCV-13)              Hepatitis C Screening  Tentatively Complete      01/16/2020  Hepatitis C Antibody component of HEP C VIRUS ANTIBODY              Influenza Vaccine (Series Information) Completed      01/09/2024  Imm Admin: Influenza Vaccine, Quadrivalent, Adjuvanted (Pf)    11/21/2022  Imm Admin: Influenza Vaccine Adult HD    11/20/2022  Imm Admin: Influenza Vaccine Adult HD    10/20/2021  Imm Admin: Influenza Vaccine Adult HD    11/12/2020  Imm Admin: Influenza Vaccine Adult HD    Only the first 5 history entries have been loaded, but more history exists.              HPV Vaccines (Series Information) Aged Out      No completion history exists for this topic.              Polio Vaccine (Inactivated Polio) (Series Information) Aged Out      No completion history exists for this topic.              Meningococcal Immunization (Series Information) Aged Out      No completion history exists for this topic.                  Patient Care Team:  Erin Madden M.D. as PCP - General (Internal Medicine)  [facility]  Yobany Gray M.D. as Consulting Physician (Internal Medicine Clinical Cardiac Electrophysiology)  Aniket Waterman M.D. as Consulting Physician (Neurology)    Social History     Tobacco Use    Smoking status: Never    Smokeless tobacco: Never   Vaping Use    Vaping Use: Never used   Substance Use Topics    Alcohol use: Yes     Alcohol/week: 8.4 oz     Types: 7 Glasses of wine, 7 Standard drinks or equivalent per week     Comment: 2 glasses wine/night    Drug use: No     Family History   Problem Relation Age of Onset    Cancer Mother         bladder non-smoker    Dementia Father     Cancer Brother         prostate age 52     He  has a past medical history of Arrhythmia, Arthritis (03/04/2022), Bowel habit changes (03/04/2022), Cancer (HCC), Dental disorder (03/04/2022), GERD (gastroesophageal reflux disease),  "Hypertension, Parkinson disease, Rash (03/04/2022), and Snoring.   Past Surgical History:   Procedure Laterality Date    PB TOTAL KNEE ARTHROPLASTY Left 12/05/2022    Procedure: LEFT TOTAL KNEE ARTHROPLASTY;  Surgeon: Jim Hu M.D.;  Location: [facility];  Service: Orthopedics    ARTHROPLASTY      DENTAL EXTRACTION(S)      wisdom teeth removed at age 18    OTHER      mohs to nose, back       Exam:   /84 (BP Location: Left arm, Patient Position: Sitting, BP Cuff Size: Adult)   Pulse 66   Temp 36.4 °C (97.6 °F) (Temporal)   Ht 1.956 m (6' 5\")   Wt 111 kg (244 lb 11.4 oz)   SpO2 96%  Body mass index is 29.02 kg/m².    Hearing good.    Dentition good  Alert, oriented in no acute distress.  Eye contact is good, speech goal directed, affect calm    Assessment and Plan. The following treatment and monitoring plan is recommended:    Problem List Items Addressed This Visit       Essential hypertension (Chronic)    Relevant Orders    Lipid Profile    CBC WITH DIFFERENTIAL    Comp Metabolic Panel    TSH WITH REFLEX TO FT4    Parkinson's disease (Chronic)     Stable, controlled on current regimen, follow-up with neurology.         Paroxysmal atrial fibrillation (HCC) (Chronic)     Feeling well, status post ablation, no recent paroxysms.         Medicare annual wellness visit, subsequent    Secondary hypercoagulable state (HCC)     Stable, continue Eliquis          Other Visit Diagnoses       Vitamin D deficiency        Relevant Orders    VITAMIN D,25 HYDROXY (DEFICIENCY)    Prostate cancer screening        Relevant Orders    PROSTATE SPECIFIC AG SCREENING          Services suggested: No services needed at this time  Health Care Screening: Age-appropriate preventive services recommended by USPTF and ACIP covered by Medicare were discussed today. Services ordered if indicated and agreed upon by the patient.  Referrals offered: Community-based lifestyle interventions to reduce health risks " and promote self-management and wellness, fall prevention, nutrition, physical activity, tobacco-use cessation, weight loss, and mental health services as per orders if indicated.    Discussion today about general wellness and lifestyle habits:    Prevent falls and reduce trip hazards; Cautioned about securing or removing rugs.  Have a working fire alarm and carbon monoxide detector;   Engage in regular physical activity and social activities     Follow-up: Return in about 1 year (around 1/30/2025), or if symptoms worsen or fail to improve, for Medicare Wellness visit.

## 2024-01-31 ENCOUNTER — TELEPHONE (OUTPATIENT)
Dept: NEUROLOGY | Facility: MEDICAL CENTER | Age: 74
End: 2024-01-31
Payer: MEDICARE

## 2024-01-31 NOTE — TELEPHONE ENCOUNTER
Received Refill PA request via [facility]  for Carbidopa-Levodopa  MG Tabs. (Quantity:270, Day Supply:90) - Copay $30.00 (No PA req'd)     Insurance: [employer] ([place])  Member ID:  [id number]  BIN: [id number]  PCN: [id number]  Group: [id number]     Ran Test claim via [place] & medication Pays for a $30.00 copay. Will outreach to patient to offer specialty pharmacy services and or release to preferred pharmacy

## 2024-01-31 NOTE — TELEPHONE ENCOUNTER
Received request via: Pharmacy    Medication Name/Dosage Carbidopa-Levodopa     When was medication last prescribed 9/13/23    How many refills were previously provided 1    How many Refills does he patient have left from last prescription 0    Was the patient seen in the last year in this department? No   Date of last office visit 3/25/ 22    Per last Neurology Office Visit, when was the date of next follow up visit set for?                          1 yr   Date of office visit follow up request 3/25/23     Does the patient have an upcoming appointment? Yes   If yes, when 5/20/24             If no, schedule appointment     Does the patient have [facility] and need 100 day supply (blood pressure, diabetes and cholesterol meds only)? Medication is not for cholesterol, blood pressure or diabetes

## 2024-02-01 RX ORDER — TRIAMCINOLONE ACETONIDE 1 MG/G
CREAM TOPICAL
Qty: 454 G | Refills: 0 | Status: SHIPPED | OUTPATIENT
Start: 2024-02-01

## 2024-02-02 ENCOUNTER — TELEPHONE (OUTPATIENT)
Dept: CARDIOLOGY | Facility: MEDICAL CENTER | Age: 74
End: 2024-02-02
Payer: MEDICARE

## 2024-02-03 NOTE — TELEPHONE ENCOUNTER
Patient notified staff that he is in need of financial assistance for his Eliquis 5mg tabs. I ran a test claim and his insurance rejected for being too soon to fill. I called the [facility] pharmacy on file to find out the copay and they said the patient picked up 124 tablets on 01/23/24 for $711.31 at 11:42am. I called the patient and left a message for him to give me a call back regarding his prescription and to screen him for FA options. [place], [place]06[place]24 until March.    Waiting to hear back from patient.

## 2024-03-21 DIAGNOSIS — G20.A1 PARKINSON'S DISEASE (HCC): ICD-10-CM

## 2024-03-23 DIAGNOSIS — I48.0 PAROXYSMAL ATRIAL FIBRILLATION (HCC): ICD-10-CM

## 2024-03-25 NOTE — TELEPHONE ENCOUNTER
Received request via: Pharmacy    Medication Name/Dosage Rasagiline     When was medication last prescribed 3/30/2023    How many refills were previously provided 3    How many Refills does he patient have left from last prescription 0    Was the patient seen in the last year in this department? No   Date of last office visit 3/25/2022     Per last Neurology Office Visit, when was the date of next follow up visit set for?                          1 yr   Date of office visit follow up request 3/25/2023     Does the patient have an upcoming appointment? Yes   If yes, when 5/20/2024             If no, schedule appointment     Does the patient have [facility] and need 100 day supply (blood pressure, diabetes and cholesterol meds only)? Medication is not for cholesterol, blood pressure or diabetes

## 2024-03-26 RX ORDER — RASAGILINE 1 MG/1
1 TABLET ORAL
Qty: 90 TABLET | Refills: 3 | Status: SHIPPED | OUTPATIENT
Start: 2024-03-26

## 2024-04-04 ENCOUNTER — HOSPITAL ENCOUNTER (OUTPATIENT)
Dept: LAB | Facility: MEDICAL CENTER | Age: 74
End: 2024-04-04
Attending: INTERNAL MEDICINE
Payer: MEDICARE

## 2024-04-04 DIAGNOSIS — E55.9 VITAMIN D DEFICIENCY: ICD-10-CM

## 2024-04-04 DIAGNOSIS — I10 ESSENTIAL HYPERTENSION: Chronic | ICD-10-CM

## 2024-04-04 DIAGNOSIS — Z12.5 PROSTATE CANCER SCREENING: ICD-10-CM

## 2024-04-04 LAB
ALBUMIN SERPL BCP-MCNC: 4.5 G/DL (ref 3.2–4.9)
ALBUMIN/GLOB SERPL: 1.9 G/DL
ALP SERPL-CCNC: 91 U/L (ref 30–99)
ALT SERPL-CCNC: 17 U/L (ref 2–50)
ANION GAP SERPL CALC-SCNC: 11 MMOL/L (ref 7–16)
AST SERPL-CCNC: 18 U/L (ref 12–45)
BASOPHILS # BLD AUTO: 0.7 % (ref 0–1.8)
BASOPHILS # BLD: 0.03 K/UL (ref 0–0.12)
BILIRUB SERPL-MCNC: 1.1 MG/DL (ref 0.1–1.5)
BUN SERPL-MCNC: 16 MG/DL (ref 8–22)
CALCIUM ALBUM COR SERPL-MCNC: 9.1 MG/DL (ref 8.5–10.5)
CALCIUM SERPL-MCNC: 9.5 MG/DL (ref 8.5–10.5)
CHLORIDE SERPL-SCNC: 100 MMOL/L (ref 96–112)
CHOLEST SERPL-MCNC: 165 MG/DL (ref 100–199)
CO2 SERPL-SCNC: 26 MMOL/L (ref 20–33)
CREAT SERPL-MCNC: 0.97 MG/DL (ref 0.5–1.4)
EOSINOPHIL # BLD AUTO: 0.04 K/UL (ref 0–0.51)
EOSINOPHIL NFR BLD: 0.9 % (ref 0–6.9)
ERYTHROCYTE [DISTWIDTH] IN BLOOD BY AUTOMATED COUNT: 39.2 FL (ref 35.9–50)
FASTING STATUS PATIENT QL REPORTED: NORMAL
GFR SERPLBLD CREATININE-BSD FMLA CKD-EPI: 83 ML/MIN/1.73 M 2
GLOBULIN SER CALC-MCNC: 2.4 G/DL (ref 1.9–3.5)
GLUCOSE SERPL-MCNC: 99 MG/DL (ref 65–99)
HCT VFR BLD AUTO: 46.5 % (ref 42–52)
HDLC SERPL-MCNC: 51 MG/DL
HGB BLD-MCNC: 15.5 G/DL (ref 14–18)
IMM GRANULOCYTES # BLD AUTO: 0.01 K/UL (ref 0–0.11)
IMM GRANULOCYTES NFR BLD AUTO: 0.2 % (ref 0–0.9)
LDLC SERPL CALC-MCNC: 98 MG/DL
LYMPHOCYTES # BLD AUTO: 1.48 K/UL (ref 1–4.8)
LYMPHOCYTES NFR BLD: 34.8 % (ref 22–41)
MCH RBC QN AUTO: 29.1 PG (ref 27–33)
MCHC RBC AUTO-ENTMCNC: 33.3 G/DL (ref 32.3–36.5)
MCV RBC AUTO: 87.4 FL (ref 81.4–97.8)
MONOCYTES # BLD AUTO: 0.41 K/UL (ref 0–0.85)
MONOCYTES NFR BLD AUTO: 9.6 % (ref 0–13.4)
NEUTROPHILS # BLD AUTO: 2.28 K/UL (ref 1.82–7.42)
NEUTROPHILS NFR BLD: 53.8 % (ref 44–72)
NRBC # BLD AUTO: 0 K/UL
NRBC BLD-RTO: 0 /100 WBC (ref 0–0.2)
PLATELET # BLD AUTO: 199 K/UL (ref 164–446)
PMV BLD AUTO: 9.4 FL (ref 9–12.9)
POTASSIUM SERPL-SCNC: 4.4 MMOL/L (ref 3.6–5.5)
PROT SERPL-MCNC: 6.9 G/DL (ref 6–8.2)
RBC # BLD AUTO: 5.32 M/UL (ref 4.7–6.1)
SODIUM SERPL-SCNC: 137 MMOL/L (ref 135–145)
TRIGL SERPL-MCNC: 81 MG/DL (ref 0–149)
WBC # BLD AUTO: 4.3 K/UL (ref 4.8–10.8)

## 2024-04-04 PROCEDURE — 80061 LIPID PANEL: CPT

## 2024-04-04 PROCEDURE — 82306 VITAMIN D 25 HYDROXY: CPT

## 2024-04-04 PROCEDURE — 36415 COLL VENOUS BLD VENIPUNCTURE: CPT | Mod: GA

## 2024-04-04 PROCEDURE — 84443 ASSAY THYROID STIM HORMONE: CPT

## 2024-04-04 PROCEDURE — 84153 ASSAY OF PSA TOTAL: CPT | Mod: GA

## 2024-04-04 PROCEDURE — 80053 COMPREHEN METABOLIC PANEL: CPT

## 2024-04-04 PROCEDURE — 85025 COMPLETE CBC W/AUTO DIFF WBC: CPT

## 2024-04-05 LAB
25(OH)D3 SERPL-MCNC: 30 NG/ML (ref 30–100)
PSA SERPL-MCNC: 1.25 NG/ML (ref 0–4)
TSH SERPL DL<=0.005 MIU/L-ACNC: 2.27 UIU/ML (ref 0.38–5.33)

## 2024-05-20 ENCOUNTER — TELEPHONE (OUTPATIENT)
Dept: PHARMACY | Facility: MEDICAL CENTER | Age: 74
End: 2024-05-20

## 2024-05-20 ENCOUNTER — OFFICE VISIT (OUTPATIENT)
Dept: NEUROLOGY | Facility: MEDICAL CENTER | Age: 74
End: 2024-05-20
Attending: PSYCHIATRY & NEUROLOGY
Payer: MEDICARE

## 2024-05-20 VITALS
HEART RATE: 57 BPM | DIASTOLIC BLOOD PRESSURE: 70 MMHG | TEMPERATURE: 97.9 F | OXYGEN SATURATION: 97 % | BODY MASS INDEX: 28.43 KG/M2 | SYSTOLIC BLOOD PRESSURE: 110 MMHG | RESPIRATION RATE: 16 BRPM | WEIGHT: 240.74 LBS | HEIGHT: 77 IN

## 2024-05-20 DIAGNOSIS — G20.A1 PARKINSON'S DISEASE (HCC): Primary | ICD-10-CM

## 2024-05-20 PROCEDURE — 3074F SYST BP LT 130 MM HG: CPT | Performed by: PSYCHIATRY & NEUROLOGY

## 2024-05-20 PROCEDURE — 99215 OFFICE O/P EST HI 40 MIN: CPT | Performed by: PSYCHIATRY & NEUROLOGY

## 2024-05-20 PROCEDURE — 3078F DIAST BP <80 MM HG: CPT | Performed by: PSYCHIATRY & NEUROLOGY

## 2024-05-20 RX ORDER — RASAGILINE 1 MG/1
1 TABLET ORAL
Qty: 90 TABLET | Refills: 3 | Status: SHIPPED | OUTPATIENT
Start: 2024-05-20

## 2024-05-20 ASSESSMENT — ENCOUNTER SYMPTOMS
LOSS OF CONSCIOUSNESS: 0
FALLS: 0
INSOMNIA: 0
CONSTIPATION: 1
MEMORY LOSS: 0
TREMORS: 1
HALLUCINATIONS: 0

## 2024-05-20 ASSESSMENT — FIBROSIS 4 INDEX: FIB4 SCORE: 1.56

## 2024-05-20 NOTE — PROGRESS NOTES
Subjective     Gopal Mallory Jr. is a 73 y.o. male who presents for follow-up, last seen in March, 2022, with a history of mild left-sided Parkinson's disease, and whose symptoms have progressed slowly over the last couple of years.     HPI    When last seen, Gopal had requested an increase in his Sinemet 25/100 regimen, going from twice a day to 3 times a day, his first dose is not taken at about 10 AM (he gets up at 6 AM!),  Then about 3 PM and finally into the early evening.  The drug does provide a consistent 5-hour duration of benefit, if he takes it into the evening late enough, it makes it easier for him to sleep without the tremor of the left hand acting up.  He tolerates the drug at a 3 times daily regimen without dyskinesias, dystonia, drowsiness or hallucinations.    From day today he gets a consistent benefit, he knows that the drug begins to work within an hour of its use.  He does well in the first 4 hours every morning before 10 AM and taking his first dose along with Azilect.  He denies freezing, falls, on/off episodes, etc.  Usually is the tremor of the left hand and loss of dexterity that tell him he is either late with a dose or has forgotten 1 completely.    He is fully oriented, there may be a little swelling with mental processing speed, but he is independent.  Mood has been stable.  He sleeps easily except it is disturbed because of his tremor.  Voice finding cloudier soften, he drools only rarely, but swallowing is only a problem if he swallows pills.  He does not cough consistently with types of food.  There is no tremor or stiffness with the right upper extremity.  He walks in steady fashion, he has not been falling, he denies tripping or shuffling, his gait has normally been a shortened stride even before his diagnosis.    He does have some issues with constipation, he can have upwards of several days without going to the bathroom, then he can go to the bathroom a couple of  "times within 24 hours.  There are no issues with urinary habit.    Medical, surgical and family histories are reviewed, there are no new drug allergies.  He just underwent surgery for a facial basal cell carcinoma, scheduled for right ear removal of a squamous cell malignancy.  He is on Sinemet 25/100, 3 times daily, Azilect 1 mg daily, along with Diovan, Eliquis, Prilosec, Flonase.    Review of Systems   Constitutional:  Negative for malaise/fatigue.   Gastrointestinal:  Positive for constipation.   Genitourinary:  Negative for frequency.   Musculoskeletal:  Negative for falls.   Neurological:  Positive for tremors. Negative for loss of consciousness.   Psychiatric/Behavioral:  Negative for hallucinations and memory loss. The patient does not have insomnia.    All other systems reviewed and are negative.    Objective     /70 (BP Location: Right arm, Patient Position: Sitting, BP Cuff Size: Adult)   Pulse (!) 57   Temp 36.6 °C (97.9 °F) (Temporal)   Resp 16   Ht 1.956 m (6' 5\")   Wt 109 kg (240 lb 11.9 oz)   SpO2 97%   BMI 28.55 kg/m²      Physical Exam    He appears in no acute distress.  He is quite cooperative.  Vital signs are stable, pulse is a little low at 57, rhythm is regular.  There is no malar rash or sialorrhea.  The neck is supple.  Cardiac evaluation reveals a regular rhythm.  There is no lower extremity edema.     Neurological Exam    He is fully oriented, there is no aphasia, apraxia, or inattention.  Mental processing speed is normal.    PERRLA/EOMI, eye blink frequency is reduced, there is a slight increase in the degree of bradykinesia.  Facial movements are symmetric, there is some increase in hypophonia, and now with some degree of tachyphemia.  There is no dysarthria.  Sensory exam is intact to temperature.  The tongue and uvula are midline, shoulder shrug and head rotation are normal.    Musculoskeletal exam does reveal a slight increase in the overall degree of bradykinesia, " rigidity also more prominent with the left upper extremity only, with distraction, there is none on the right side.  Still there is no asterixis or drift.  Strength is intact in all 4 extremities.  Reflex exam was deferred.    He stands easily, requires a little more assistance as he does so, but there is no hesitancy with gait initiation.  Stride length is still symmetrically shortened, he does not shuffle, but armswing is diminished bilaterally.  The tremor of the left hand does not reemerge as he walks.  Though there is no appendicular dystaxia, movements are simply slower with the left upper and lower extremity.  Repetitive movements are also slower on the left and amplitudes are diminished relative to those on the right side.  Heel and toe walking are normal, he has some difficulty with tandem, the left lower extremity simply moving slowly and keeping him off balance.    Sensory exam is intact to temperature and vibration.  Romberg is absent.    Assessment & Plan     1. Parkinson's disease (HCC)  Things have progressed slightly, not surprisingly given the 2-year interval since he was last seen.  He was told he needs to be seen on an annual basis.  We will communicate via [employer] otherwise.    Though we talked about other symptomatic treatment availability including adding Comtan to his Sinemet regimen or adding a dopamine agonist, he would like to hold on this for now.  We talked about signs that might suggest increasing his medicine, including overall slowness, falls, increasing tremor and loss of dexterity with the left hand, all of these things impairing his quality of life and independent cares.  At that point in time we can add something.  I encouraged him to stay physically active, something he notes makes his symptoms improve.  For now we will simply follow-up in another year.    Time: - rasagiline (AZILECT) 1 MG Tab; Take 1 Tablet by mouth every day.  Dispense: 90 Tablet; Refill: 3  - carbidopa-levodopa  (SINEMET)  MG Tab; Take 1 Tablet by mouth 3 times a day.  Dispense: 270 Tablet; Refill: 3    Time: 40 minutes in total spent on patient care including pre-charting, record review, discussion with healthcare staff and documentation.  This includes face-to-face time for exam, review, discussion, as well as counseling and coordinating care.

## 2024-05-20 NOTE — TELEPHONE ENCOUNTER
Received Refill PA request via [facility]  for carbidopa-levodopa (SINEMET)  MG Tablet. (Quantity:270, Day Supply:90)     Insurance: [employer]  Member ID:  [id number]  BIN: [id number]  PCN: [id number]  Group: [id number]     Ran Test claim via [place] & medication  REFILL TOO SOON UNTIL 07/09/2024. LAST FILLED ON 05/03/2024 VIA RETAIL

## 2024-06-09 DIAGNOSIS — Z98.890 S/P ABLATION OF ATRIAL FIBRILLATION: ICD-10-CM

## 2024-06-09 DIAGNOSIS — Z86.79 S/P ABLATION OF ATRIAL FIBRILLATION: ICD-10-CM

## 2024-06-11 RX ORDER — OMEPRAZOLE 20 MG/1
20 CAPSULE, DELAYED RELEASE ORAL DAILY
Qty: 90 CAPSULE | Refills: 1 | Status: SHIPPED | OUTPATIENT
Start: 2024-06-11

## 2024-06-24 DIAGNOSIS — I10 ESSENTIAL HYPERTENSION: ICD-10-CM

## 2024-06-24 RX ORDER — VALSARTAN 80 MG/1
40 TABLET ORAL
Qty: 45 TABLET | Refills: 3 | Status: SHIPPED | OUTPATIENT
Start: 2024-06-24

## 2024-06-24 NOTE — TELEPHONE ENCOUNTER
Received request via: Pharmacy    Was the patient seen in the last year in this department? Yes    Does the patient have an active prescription (recently filled or refills available) for medication(s) requested? No    [facility]    Does the patient have [facility] Plus and need 100 day supply (blood pressure, diabetes and cholesterol meds only)? Patient does not have SCP

## 2024-07-15 DIAGNOSIS — G20.A1 PARKINSON'S DISEASE WITHOUT DYSKINESIA OR FLUCTUATING MANIFESTATIONS (HCC): ICD-10-CM

## 2024-07-15 RX ORDER — ENTACAPONE 200 MG/1
TABLET ORAL
Qty: 84 TABLET | Refills: 1 | Status: SHIPPED | OUTPATIENT
Start: 2024-07-15 | End: 2024-08-26

## 2024-07-29 ENCOUNTER — TELEPHONE (OUTPATIENT)
Dept: CARDIOLOGY | Facility: MEDICAL CENTER | Age: 74
End: 2024-07-29
Payer: MEDICARE

## 2024-09-10 DIAGNOSIS — Z98.890 S/P ABLATION OF ATRIAL FIBRILLATION: ICD-10-CM

## 2024-09-10 DIAGNOSIS — Z86.79 S/P ABLATION OF ATRIAL FIBRILLATION: ICD-10-CM

## 2024-11-20 NOTE — PROGRESS NOTES
"No chief complaint on file.      Subjective     Gopal Mallory Jr. is a 74 y.o. male former patient of Dr. Gray who presents today for annual follow up.    Patient was last seen by Dr. Gray on 12/20/2023. He was overall doing well. No changes were made to his cardiac regimen.     PMH pertinent for AF with h/o ablation, anticoagulated with Eliquis, HTN, GERD and Parkinson's disease.     Today patient states that      Past Medical History:   Diagnosis Date    Arrhythmia     Afib    Arthritis 03/04/2022    Knuckles and knees.    Bowel habit changes 03/04/2022    Constipation    Cancer (HCC)     Basal cell/ Squamous skin    Dental disorder 03/04/2022    Waiting for implant front top tooth.  Tooth pulled a couple of months ago.    GERD (gastroesophageal reflux disease)     Hypertension     Parkinson disease (HCC)     Rash 03/04/2022    \"Jock Rash\"    Snoring     Negative sleep study.     Past Surgical History:   Procedure Laterality Date    PB TOTAL KNEE ARTHROPLASTY Left 12/05/2022    Procedure: LEFT TOTAL KNEE ARTHROPLASTY;  Surgeon: Jim Hu M.D.;  Location: [facility];  Service: Orthopedics    ARTHROPLASTY      DENTAL EXTRACTION(S)      wisdom teeth removed at age 18    OTHER      mohs to nose, back     Family History   Problem Relation Age of Onset    Cancer Mother         bladder non-smoker    Dementia Father     Cancer Brother         prostate age 52     Social History     Socioeconomic History    Marital status:      Spouse name: Not on file    Number of children: Not on file    Years of education: Not on file    Highest education level: Bachelor's degree (e.g., BA, AB, BS)   Occupational History    Not on file   Tobacco Use    Smoking status: Never    Smokeless tobacco: Never   Vaping Use    Vaping status: Never Used   Substance and Sexual Activity    Alcohol use: Yes     Alcohol/week: 8.4 oz     Types: 7 Glasses of wine, 7 Standard drinks or equivalent per week    "  Comment: 2 glasses wine/night    Drug use: No    Sexual activity: Yes     Partners: Female   Other Topics Concern    Not on file   Social History Narrative    Not on file     Social Drivers of Health     Financial Resource Strain: Low Risk  (1/29/2023)    Overall Financial Resource Strain (CARDIA)     Difficulty of Paying Living Expenses: Not hard at all   Food Insecurity: No Food Insecurity (1/29/2023)    Hunger Vital Sign     Worried About Running Out of Food in the Last Year: Never true     Ran Out of Food in the Last Year: Never true   Transportation Needs: No Transportation Needs (1/29/2023)    PRAPARE - Transportation     Lack of Transportation (Medical): No     Lack of Transportation (Non-Medical): No   Physical Activity: Sufficiently Active (1/29/2023)    Exercise Vital Sign     Days of Exercise per Week: 5 days     Minutes of Exercise per Session: 60 min   Stress: No Stress Concern Present (1/29/2023)    [employer] - Occupational Stress Questionnaire     Feeling of Stress : Not at all   Social Connections: Moderately Integrated (1/29/2023)    Social Connection and Isolation Panel [NHANES]     Frequency of Communication with Friends and Family: More than three times a week     Frequency of Social Gatherings with Friends and Family: More than three times a week     Attends Sabianist Services: Never     Active Member of Clubs or Organizations: Yes     Attends Club or Organization Meetings: More than 4 times per year     Marital Status:    Intimate Partner Violence: Not on file   Housing Stability: Low Risk  (1/29/2023)    Housing Stability Vital Sign     Unable to Pay for Housing in the Last Year: No     Number of Places Lived in the Last Year: 1     Unstable Housing in the Last Year: No     Allergies   Allergen Reactions    Molds & Smuts Unspecified     Sneezing     Outpatient Encounter Medications as of 12/11/2024   Medication Sig Dispense Refill    omeprazole (PRILOSEC)  20 MG delayed-release capsule TAKE 1 CAPSULE BY MOUTH EVERY DAY. TAKE EVERY MORNING FOR 30 DAYS POST ABLATION TO PROTECT YOUR ESOPHAGUS. 90 Capsule 1    valsartan (DIOVAN) 80 MG Tab TAKE 1/2 TABLET BY MOUTH EVERY DAY 45 Tablet 3    rasagiline (AZILECT) 1 MG Tab Take 1 Tablet by mouth every day. 90 Tablet 3    carbidopa-levodopa (SINEMET)  MG Tab Take 1 Tablet by mouth 3 times a day. 270 Tablet 3    apixaban (ELIQUIS) 5mg Tab TAKE 1 TABLET BY MOUTH TWICE A  Tablet 3    fluticasone (FLONASE) 50 MCG/ACT nasal spray Spray 1 Spray in nose 1 time daily as needed (ALLERGY).       No facility-administered encounter medications on file as of 12/11/2024.     ROS           Objective     There were no vitals taken for this visit.    Physical Exam           Assessment & Plan     No diagnosis found.    Medical Decision Making: Today's Assessment/Status/Plan:          PAC's/PVC's/PAF:  - H/O successful AF, typical and atypical flutter ablation on 3/8/2022 with Dr. Gray.  -      HTN:  - Well controlled.   - Continue valsartan 80 mg daily.

## 2024-12-11 ENCOUNTER — APPOINTMENT (OUTPATIENT)
Dept: CARDIOLOGY | Facility: MEDICAL CENTER | Age: 74
End: 2024-12-11
Attending: NURSE PRACTITIONER
Payer: MEDICARE

## 2025-01-02 NOTE — PROGRESS NOTES
"Chief Complaint   Patient presents with    Atrial Fibrillation     F/v DX: Paroxysmal atrial fibrillation (HCC)         Subjective     Gopal Mallory Jr. is a very pleasant 74 y.o. male former patient of Dr. Gray who presents today for annual follow up.    Patient was last seen by Dr. Gray on 12/20/2023. He was overall doing well. No changes were made to his cardiac regimen.     PMH pertinent for AF with h/o ablation, anticoagulated with Eliquis, HTN, GERD and Parkinson's disease.     Today patient states that he is overall doing well. Denies having any cardiac concerns or complaints. He is tolerating OAC well with no bleeding problems. Denies AF recurrence, he does have a Kardia he will use to check his rhythm from time to time.     Past Medical History:   Diagnosis Date    Arrhythmia     Afib    Arthritis 03/04/2022    Knuckles and knees.    Bowel habit changes 03/04/2022    Constipation    Cancer (HCC)     Basal cell/ Squamous skin    Dental disorder 03/04/2022    Waiting for implant front top tooth.  Tooth pulled a couple of months ago.    GERD (gastroesophageal reflux disease)     Hypertension     Parkinson disease (HCC)     Rash 03/04/2022    \"Jock Rash\"    Snoring     Negative sleep study.     Past Surgical History:   Procedure Laterality Date    PB TOTAL KNEE ARTHROPLASTY Left 12/05/2022    Procedure: LEFT TOTAL KNEE ARTHROPLASTY;  Surgeon: Jim Hu M.D.;  Location: [facility];  Service: Orthopedics    ARTHROPLASTY      DENTAL EXTRACTION(S)      wisdom teeth removed at age 18    OTHER      mohs to nose, back     Family History   Problem Relation Age of Onset    Cancer Mother         bladder non-smoker    Dementia Father     Cancer Brother         prostate age 52     Social History     Socioeconomic History    Marital status:      Spouse name: Not on file    Number of children: Not on file    Years of education: Not on file    Highest education level: Bachelor's " degree (e.g., BA, AB, BS)   Occupational History    Not on file   Tobacco Use    Smoking status: Never    Smokeless tobacco: Never   Vaping Use    Vaping status: Never Used   Substance and Sexual Activity    Alcohol use: Yes     Alcohol/week: 8.4 oz     Types: 7 Glasses of wine, 7 Standard drinks or equivalent per week     Comment: 2 glasses wine/night    Drug use: No    Sexual activity: Yes     Partners: Female   Other Topics Concern    Not on file   Social History Narrative    Not on file     Social Drivers of Health     Financial Resource Strain: Low Risk  (1/29/2023)    Overall Financial Resource Strain (CARDIA)     Difficulty of Paying Living Expenses: Not hard at all   Food Insecurity: No Food Insecurity (1/29/2023)    Hunger Vital Sign     Worried About Running Out of Food in the Last Year: Never true     Ran Out of Food in the Last Year: Never true   Transportation Needs: No Transportation Needs (1/29/2023)    PRAPARE - Transportation     Lack of Transportation (Medical): No     Lack of Transportation (Non-Medical): No   Physical Activity: Sufficiently Active (1/29/2023)    Exercise Vital Sign     Days of Exercise per Week: 5 days     Minutes of Exercise per Session: 60 min   Stress: No Stress Concern Present (1/29/2023)    [employer] - Occupational Stress Questionnaire     Feeling of Stress : Not at all   Social Connections: Moderately Integrated (1/29/2023)    Social Connection and Isolation Panel [NHANES]     Frequency of Communication with Friends and Family: More than three times a week     Frequency of Social Gatherings with Friends and Family: More than three times a week     Attends Worship Services: Never     Active Member of Clubs or Organizations: Yes     Attends Club or Organization Meetings: More than 4 times per year     Marital Status:    Intimate Partner Violence: Not on file   Housing Stability: Low Risk  (1/29/2023)    Housing Stability Vital Sign      "Unable to Pay for Housing in the Last Year: No     Number of Places Lived in the Last Year: 1     Unstable Housing in the Last Year: No     Allergies   Allergen Reactions    Molds & Smuts Unspecified     Sneezing     Outpatient Encounter Medications as of 1/9/2025   Medication Sig Dispense Refill    apixaban (ELIQUIS) 5mg Tab TAKE 1 TABLET BY MOUTH TWICE A  Tablet 3    valsartan (DIOVAN) 80 MG Tab Take 0.5 Tablets by mouth every day. 45 Tablet 3    rasagiline (AZILECT) 1 MG Tab Take 1 Tablet by mouth every day. 90 Tablet 3    carbidopa-levodopa (SINEMET)  MG Tab Take 1 Tablet by mouth 3 times a day. 270 Tablet 3    fluticasone (FLONASE) 50 MCG/ACT nasal spray Spray 1 Spray in nose 1 time daily as needed (ALLERGY).      [DISCONTINUED] omeprazole (PRILOSEC) 20 MG delayed-release capsule TAKE 1 CAPSULE BY MOUTH EVERY DAY. TAKE EVERY MORNING FOR 30 DAYS POST ABLATION TO PROTECT YOUR ESOPHAGUS. 90 Capsule 1    [DISCONTINUED] valsartan (DIOVAN) 80 MG Tab TAKE 1/2 TABLET BY MOUTH EVERY DAY 45 Tablet 3    [DISCONTINUED] apixaban (ELIQUIS) 5mg Tab TAKE 1 TABLET BY MOUTH TWICE A  Tablet 3     No facility-administered encounter medications on file as of 1/9/2025.     Review of Systems   Constitutional:  Negative for malaise/fatigue and weight loss.   Respiratory:  Negative for shortness of breath.    Cardiovascular:  Negative for chest pain, palpitations, orthopnea, claudication, leg swelling and PND.   Neurological:  Negative for dizziness and weakness.   All other systems reviewed and are negative.             Objective     /66 (BP Location: Left arm, Patient Position: Sitting, BP Cuff Size: Adult)   Pulse 60   Resp 16   Ht 1.956 m (6' 5\")   Wt 109 kg (241 lb)   SpO2 97%   BMI 28.58 kg/m²     Physical Exam  Constitutional:       General: He is not in acute distress.     Appearance: He is well-developed.   HENT:      Head: Normocephalic.   Eyes:      Extraocular Movements: Extraocular movements " intact.   Neck:      Vascular: No carotid bruit or JVD.   Cardiovascular:      Rate and Rhythm: Normal rate and regular rhythm.      Heart sounds: Normal heart sounds. No murmur heard.  Pulmonary:      Effort: Pulmonary effort is normal.      Breath sounds: Normal breath sounds.   Musculoskeletal:      Cervical back: Normal range of motion.      Right lower leg: No edema.      Left lower leg: No edema.   Skin:     General: Skin is warm and dry.   Neurological:      Mental Status: He is alert and oriented to person, place, and time.   Psychiatric:         Behavior: Behavior normal.                Assessment & Plan     1. Paroxysmal atrial fibrillation (HCC)  EKG    apixaban (ELIQUIS) 5mg Tab      2. Essential hypertension  valsartan (DIOVAN) 80 MG Tab      3. Anticoagulated        4. Hypercoagulable state due to paroxysmal atrial fibrillation (HCC)            Medical Decision Making: Today's Assessment/Status/Plan:   PAC's/PVC's/PAF:  - H/O successful AF, typical and atypical flutter ablation on 3/8/2022 with Dr. Gray.  - Maintaining NSR.   - OAC with Eliquis 5 mg BID, tolerating well with no bleeding problems.      HTN:  - Well controlled.   - Continue valsartan 80 mg daily.     Patient will follow up in one year or earlier if needed. Encouraged patient to cont homeact our office should any questions or concerns arise in the mean time.     Future Appointments   Date Time Provider Department Center   1/30/2025 11:00 AM Erin Madden M.D. [facility]   4/21/2025 10:40 AM Aniket Waterman M.D. [facility] None   11/13/2025 12:45 PM SOPHIA Maria None

## 2025-01-09 ENCOUNTER — OFFICE VISIT (OUTPATIENT)
Dept: CARDIOLOGY | Facility: MEDICAL CENTER | Age: 75
End: 2025-01-09
Attending: NURSE PRACTITIONER
Payer: MEDICARE

## 2025-01-09 VITALS
HEART RATE: 60 BPM | WEIGHT: 241 LBS | OXYGEN SATURATION: 97 % | HEIGHT: 77 IN | DIASTOLIC BLOOD PRESSURE: 66 MMHG | BODY MASS INDEX: 28.46 KG/M2 | RESPIRATION RATE: 16 BRPM | SYSTOLIC BLOOD PRESSURE: 110 MMHG

## 2025-01-09 DIAGNOSIS — I48.0 PAROXYSMAL ATRIAL FIBRILLATION (HCC): ICD-10-CM

## 2025-01-09 DIAGNOSIS — Z79.01 ANTICOAGULATED: ICD-10-CM

## 2025-01-09 DIAGNOSIS — I10 ESSENTIAL HYPERTENSION: ICD-10-CM

## 2025-01-09 DIAGNOSIS — D68.69 HYPERCOAGULABLE STATE DUE TO PAROXYSMAL ATRIAL FIBRILLATION (HCC): ICD-10-CM

## 2025-01-09 DIAGNOSIS — I48.0 HYPERCOAGULABLE STATE DUE TO PAROXYSMAL ATRIAL FIBRILLATION (HCC): ICD-10-CM

## 2025-01-09 LAB — EKG IMPRESSION: NORMAL

## 2025-01-09 PROCEDURE — 99214 OFFICE O/P EST MOD 30 MIN: CPT | Performed by: NURSE PRACTITIONER

## 2025-01-09 PROCEDURE — 99211 OFF/OP EST MAY X REQ PHY/QHP: CPT | Performed by: NURSE PRACTITIONER

## 2025-01-09 PROCEDURE — 93005 ELECTROCARDIOGRAM TRACING: CPT | Mod: TC | Performed by: NURSE PRACTITIONER

## 2025-01-09 PROCEDURE — 3074F SYST BP LT 130 MM HG: CPT | Performed by: NURSE PRACTITIONER

## 2025-01-09 PROCEDURE — 3078F DIAST BP <80 MM HG: CPT | Performed by: NURSE PRACTITIONER

## 2025-01-09 PROCEDURE — 93010 ELECTROCARDIOGRAM REPORT: CPT | Performed by: INTERNAL MEDICINE

## 2025-01-09 RX ORDER — VALSARTAN 80 MG/1
40 TABLET ORAL
Qty: 45 TABLET | Refills: 3 | Status: SHIPPED | OUTPATIENT
Start: 2025-01-09

## 2025-01-09 ASSESSMENT — ENCOUNTER SYMPTOMS
PND: 0
CLAUDICATION: 0
SHORTNESS OF BREATH: 0
PALPITATIONS: 0
WEAKNESS: 0
WEIGHT LOSS: 0
DIZZINESS: 0
ORTHOPNEA: 0

## 2025-01-09 ASSESSMENT — FIBROSIS 4 INDEX: FIB4 SCORE: 1.58

## 2025-01-29 ENCOUNTER — NON-PROVIDER VISIT (OUTPATIENT)
Dept: CARDIOLOGY | Facility: MEDICAL CENTER | Age: 75
End: 2025-01-29
Attending: NURSE PRACTITIONER
Payer: MEDICARE

## 2025-01-29 DIAGNOSIS — I48.0 PAROXYSMAL ATRIAL FIBRILLATION (HCC): Chronic | ICD-10-CM

## 2025-01-29 LAB — EKG IMPRESSION: NORMAL

## 2025-01-29 PROCEDURE — 93005 ELECTROCARDIOGRAM TRACING: CPT | Mod: TC

## 2025-01-29 NOTE — NON-PROVIDER
Patient was here today for EKG. EKG performed and transferred into patients chart. Paper copy of EKG given to Tatum GABRIEL. RN will contact patient to follow up unless patient is symptomatic.   Is patient reporting any symptoms? Yes  If yes, RN to visit exam room

## 2025-01-29 NOTE — Clinical Note
Hello ladies! Non-Pro EKG, paper EKG given to Tatum, she will go in and speak with Pt. Pt stated no chest pain, no shortness of breath, just a little light headed.  Thank you!

## 2025-01-29 NOTE — PROGRESS NOTES
Pt in office today for non-provider EKG for possible Afib symptoms. Esperanza read Afib yesterday, JS recommended EKG in office today. EKG given to EA to advise. EA reports SB, with PAC's.   Discussed with patient EA recommendations for staying well hydrated and to continue to monitor HR/symptoms. Patient stated he may have been a little dehydrated yesterday.  Pt taking all current medications on med list, no missed doses.   No SOB, CP.  Pt stating he has only been a little lightheaded today, pt feeling fine today.       JS - Please advise on any further recommendations.     Mona HODGE

## 2025-03-15 SDOH — ECONOMIC STABILITY: INCOME INSECURITY: IN THE LAST 12 MONTHS, WAS THERE A TIME WHEN YOU WERE NOT ABLE TO PAY THE MORTGAGE OR RENT ON TIME?: NO

## 2025-03-15 SDOH — ECONOMIC STABILITY: FOOD INSECURITY: WITHIN THE PAST 12 MONTHS, THE FOOD YOU BOUGHT JUST DIDN'T LAST AND YOU DIDN'T HAVE MONEY TO GET MORE.: NEVER TRUE

## 2025-03-15 SDOH — HEALTH STABILITY: PHYSICAL HEALTH: ON AVERAGE, HOW MANY MINUTES DO YOU ENGAGE IN EXERCISE AT THIS LEVEL?: 40 MIN

## 2025-03-15 SDOH — HEALTH STABILITY: PHYSICAL HEALTH: ON AVERAGE, HOW MANY DAYS PER WEEK DO YOU ENGAGE IN MODERATE TO STRENUOUS EXERCISE (LIKE A BRISK WALK)?: 6 DAYS

## 2025-03-15 SDOH — ECONOMIC STABILITY: FOOD INSECURITY: WITHIN THE PAST 12 MONTHS, YOU WORRIED THAT YOUR FOOD WOULD RUN OUT BEFORE YOU GOT MONEY TO BUY MORE.: NEVER TRUE

## 2025-03-15 SDOH — ECONOMIC STABILITY: INCOME INSECURITY: HOW HARD IS IT FOR YOU TO PAY FOR THE VERY BASICS LIKE FOOD, HOUSING, MEDICAL CARE, AND HEATING?: NOT VERY HARD

## 2025-03-15 ASSESSMENT — SOCIAL DETERMINANTS OF HEALTH (SDOH)
DO YOU BELONG TO ANY CLUBS OR ORGANIZATIONS SUCH AS CHURCH GROUPS UNIONS, FRATERNAL OR ATHLETIC GROUPS, OR SCHOOL GROUPS?: YES
HOW OFTEN DO YOU ATTEND CHURCH OR RELIGIOUS SERVICES?: NEVER
HOW OFTEN DO YOU ATTENT MEETINGS OF THE CLUB OR ORGANIZATION YOU BELONG TO?: MORE THAN 4 TIMES PER YEAR
IN THE PAST 12 MONTHS, HAS THE ELECTRIC, GAS, OIL, OR WATER COMPANY THREATENED TO SHUT OFF SERVICE IN YOUR HOME?: NO
HOW OFTEN DO YOU HAVE SIX OR MORE DRINKS ON ONE OCCASION: LESS THAN MONTHLY
HOW OFTEN DO YOU ATTEND CHURCH OR RELIGIOUS SERVICES?: NEVER
HOW MANY DRINKS CONTAINING ALCOHOL DO YOU HAVE ON A TYPICAL DAY WHEN YOU ARE DRINKING: 1 OR 2
HOW OFTEN DO YOU HAVE A DRINK CONTAINING ALCOHOL: 4 OR MORE TIMES A WEEK
IN THE PAST 12 MONTHS, HAS THE ELECTRIC, GAS, OIL, OR WATER COMPANY THREATENED TO SHUT OFF SERVICE IN YOUR HOME?: NO
HOW OFTEN DO YOU ATTENT MEETINGS OF THE CLUB OR ORGANIZATION YOU BELONG TO?: MORE THAN 4 TIMES PER YEAR
HOW OFTEN DO YOU ATTENT MEETINGS OF THE CLUB OR ORGANIZATION YOU BELONG TO?: MORE THAN 4 TIMES PER YEAR
HOW OFTEN DO YOU GET TOGETHER WITH FRIENDS OR RELATIVES?: MORE THAN THREE TIMES A WEEK
WITHIN THE PAST 12 MONTHS, YOU WORRIED THAT YOUR FOOD WOULD RUN OUT BEFORE YOU GOT THE MONEY TO BUY MORE: NEVER TRUE
HOW OFTEN DO YOU GET TOGETHER WITH FRIENDS OR RELATIVES?: MORE THAN THREE TIMES A WEEK
IN A TYPICAL WEEK, HOW MANY TIMES DO YOU TALK ON THE PHONE WITH FAMILY, FRIENDS, OR NEIGHBORS?: MORE THAN THREE TIMES A WEEK
DO YOU BELONG TO ANY CLUBS OR ORGANIZATIONS SUCH AS CHURCH GROUPS UNIONS, FRATERNAL OR ATHLETIC GROUPS, OR SCHOOL GROUPS?: YES
IN A TYPICAL WEEK, HOW MANY TIMES DO YOU TALK ON THE PHONE WITH FAMILY, FRIENDS, OR NEIGHBORS?: MORE THAN THREE TIMES A WEEK
HOW OFTEN DO YOU ATTEND CHURCH OR RELIGIOUS SERVICES?: NEVER
HOW HARD IS IT FOR YOU TO PAY FOR THE VERY BASICS LIKE FOOD, HOUSING, MEDICAL CARE, AND HEATING?: NOT VERY HARD
HOW OFTEN DO YOU GET TOGETHER WITH FRIENDS OR RELATIVES?: MORE THAN THREE TIMES A WEEK
IN A TYPICAL WEEK, HOW MANY TIMES DO YOU TALK ON THE PHONE WITH FAMILY, FRIENDS, OR NEIGHBORS?: MORE THAN THREE TIMES A WEEK
DO YOU BELONG TO ANY CLUBS OR ORGANIZATIONS SUCH AS CHURCH GROUPS UNIONS, FRATERNAL OR ATHLETIC GROUPS, OR SCHOOL GROUPS?: YES

## 2025-03-15 ASSESSMENT — LIFESTYLE VARIABLES
HOW OFTEN DO YOU HAVE SIX OR MORE DRINKS ON ONE OCCASION: LESS THAN MONTHLY
SKIP TO QUESTIONS 9-10: 0
HOW MANY STANDARD DRINKS CONTAINING ALCOHOL DO YOU HAVE ON A TYPICAL DAY: 1 OR 2
HOW OFTEN DO YOU HAVE A DRINK CONTAINING ALCOHOL: 4 OR MORE TIMES A WEEK
HOW MANY STANDARD DRINKS CONTAINING ALCOHOL DO YOU HAVE ON A TYPICAL DAY: 1 OR 2
AUDIT-C TOTAL SCORE: 5
AUDIT-C TOTAL SCORE: 5
SKIP TO QUESTIONS 9-10: 0
HOW OFTEN DO YOU HAVE SIX OR MORE DRINKS ON ONE OCCASION: LESS THAN MONTHLY
HOW OFTEN DO YOU HAVE A DRINK CONTAINING ALCOHOL: 4 OR MORE TIMES A WEEK

## 2025-03-18 ENCOUNTER — APPOINTMENT (OUTPATIENT)
Dept: MEDICAL GROUP | Age: 75
End: 2025-03-18
Payer: MEDICARE

## 2025-03-18 SDOH — HEALTH STABILITY: PHYSICAL HEALTH: ON AVERAGE, HOW MANY DAYS PER WEEK DO YOU ENGAGE IN MODERATE TO STRENUOUS EXERCISE (LIKE A BRISK WALK)?: 6 DAYS

## 2025-03-18 SDOH — ECONOMIC STABILITY: FOOD INSECURITY: WITHIN THE PAST 12 MONTHS, YOU WORRIED THAT YOUR FOOD WOULD RUN OUT BEFORE YOU GOT MONEY TO BUY MORE.: NEVER TRUE

## 2025-03-18 SDOH — ECONOMIC STABILITY: INCOME INSECURITY: IN THE LAST 12 MONTHS, WAS THERE A TIME WHEN YOU WERE NOT ABLE TO PAY THE MORTGAGE OR RENT ON TIME?: NO

## 2025-03-18 SDOH — ECONOMIC STABILITY: FOOD INSECURITY: WITHIN THE PAST 12 MONTHS, THE FOOD YOU BOUGHT JUST DIDN'T LAST AND YOU DIDN'T HAVE MONEY TO GET MORE.: NEVER TRUE

## 2025-03-18 SDOH — ECONOMIC STABILITY: INCOME INSECURITY: HOW HARD IS IT FOR YOU TO PAY FOR THE VERY BASICS LIKE FOOD, HOUSING, MEDICAL CARE, AND HEATING?: NOT VERY HARD

## 2025-03-18 SDOH — HEALTH STABILITY: PHYSICAL HEALTH: ON AVERAGE, HOW MANY MINUTES DO YOU ENGAGE IN EXERCISE AT THIS LEVEL?: 40 MIN

## 2025-03-18 ASSESSMENT — SOCIAL DETERMINANTS OF HEALTH (SDOH)
HOW OFTEN DO YOU ATTENT MEETINGS OF THE CLUB OR ORGANIZATION YOU BELONG TO?: MORE THAN 4 TIMES PER YEAR
HOW OFTEN DO YOU HAVE SIX OR MORE DRINKS ON ONE OCCASION: LESS THAN MONTHLY
IN A TYPICAL WEEK, HOW MANY TIMES DO YOU TALK ON THE PHONE WITH FAMILY, FRIENDS, OR NEIGHBORS?: MORE THAN THREE TIMES A WEEK
HOW HARD IS IT FOR YOU TO PAY FOR THE VERY BASICS LIKE FOOD, HOUSING, MEDICAL CARE, AND HEATING?: NOT VERY HARD
DO YOU BELONG TO ANY CLUBS OR ORGANIZATIONS SUCH AS CHURCH GROUPS UNIONS, FRATERNAL OR ATHLETIC GROUPS, OR SCHOOL GROUPS?: YES
DO YOU BELONG TO ANY CLUBS OR ORGANIZATIONS SUCH AS CHURCH GROUPS UNIONS, FRATERNAL OR ATHLETIC GROUPS, OR SCHOOL GROUPS?: YES
WITHIN THE PAST 12 MONTHS, YOU WORRIED THAT YOUR FOOD WOULD RUN OUT BEFORE YOU GOT THE MONEY TO BUY MORE: NEVER TRUE
HOW OFTEN DO YOU ATTEND CHURCH OR RELIGIOUS SERVICES?: NEVER
HOW OFTEN DO YOU GET TOGETHER WITH FRIENDS OR RELATIVES?: MORE THAN THREE TIMES A WEEK
IN THE PAST 12 MONTHS, HAS THE ELECTRIC, GAS, OIL, OR WATER COMPANY THREATENED TO SHUT OFF SERVICE IN YOUR HOME?: NO
HOW MANY DRINKS CONTAINING ALCOHOL DO YOU HAVE ON A TYPICAL DAY WHEN YOU ARE DRINKING: 1 OR 2
IN A TYPICAL WEEK, HOW MANY TIMES DO YOU TALK ON THE PHONE WITH FAMILY, FRIENDS, OR NEIGHBORS?: MORE THAN THREE TIMES A WEEK
HOW OFTEN DO YOU ATTEND CHURCH OR RELIGIOUS SERVICES?: NEVER
HOW OFTEN DO YOU GET TOGETHER WITH FRIENDS OR RELATIVES?: MORE THAN THREE TIMES A WEEK
HOW OFTEN DO YOU ATTENT MEETINGS OF THE CLUB OR ORGANIZATION YOU BELONG TO?: MORE THAN 4 TIMES PER YEAR
HOW OFTEN DO YOU HAVE A DRINK CONTAINING ALCOHOL: 4 OR MORE TIMES A WEEK

## 2025-03-18 ASSESSMENT — LIFESTYLE VARIABLES
AUDIT-C TOTAL SCORE: 5
SKIP TO QUESTIONS 9-10: 0
HOW MANY STANDARD DRINKS CONTAINING ALCOHOL DO YOU HAVE ON A TYPICAL DAY: 1 OR 2
HOW OFTEN DO YOU HAVE A DRINK CONTAINING ALCOHOL: 4 OR MORE TIMES A WEEK
HOW OFTEN DO YOU HAVE SIX OR MORE DRINKS ON ONE OCCASION: LESS THAN MONTHLY

## 2025-03-20 ENCOUNTER — OFFICE VISIT (OUTPATIENT)
Dept: MEDICAL GROUP | Age: 75
End: 2025-03-20
Payer: MEDICARE

## 2025-03-20 VITALS
HEIGHT: 78 IN | TEMPERATURE: 97.4 F | RESPIRATION RATE: 16 BRPM | WEIGHT: 241 LBS | DIASTOLIC BLOOD PRESSURE: 78 MMHG | BODY MASS INDEX: 27.88 KG/M2 | HEART RATE: 54 BPM | SYSTOLIC BLOOD PRESSURE: 138 MMHG | OXYGEN SATURATION: 97 %

## 2025-03-20 DIAGNOSIS — I10 ESSENTIAL HYPERTENSION: ICD-10-CM

## 2025-03-20 DIAGNOSIS — Z00.00 MEDICARE ANNUAL WELLNESS VISIT, SUBSEQUENT: ICD-10-CM

## 2025-03-20 DIAGNOSIS — Z12.5 PROSTATE CANCER SCREENING: ICD-10-CM

## 2025-03-20 DIAGNOSIS — E55.9 VITAMIN D DEFICIENCY: ICD-10-CM

## 2025-03-20 PROCEDURE — G0439 PPPS, SUBSEQ VISIT: HCPCS | Performed by: INTERNAL MEDICINE

## 2025-03-20 PROCEDURE — 3075F SYST BP GE 130 - 139MM HG: CPT | Performed by: INTERNAL MEDICINE

## 2025-03-20 PROCEDURE — 3078F DIAST BP <80 MM HG: CPT | Performed by: INTERNAL MEDICINE

## 2025-03-20 RX ORDER — OMEPRAZOLE 20 MG/1
CAPSULE, DELAYED RELEASE ORAL
COMMUNITY
Start: 2025-01-15

## 2025-03-20 ASSESSMENT — ACTIVITIES OF DAILY LIVING (ADL): BATHING_REQUIRES_ASSISTANCE: 0

## 2025-03-20 ASSESSMENT — PATIENT HEALTH QUESTIONNAIRE - PHQ9: CLINICAL INTERPRETATION OF PHQ2 SCORE: 0

## 2025-03-20 ASSESSMENT — FIBROSIS 4 INDEX: FIB4 SCORE: 1.58

## 2025-03-20 ASSESSMENT — ENCOUNTER SYMPTOMS: GENERAL WELL-BEING: GOOD

## 2025-03-20 NOTE — PROGRESS NOTES
Chief Complaint   Patient presents with    Annual Wellness Visit     Verbal consent was acquired by the patient to use [employer] ambient listening note generation during this visit Yes     History of Present Illness  Gopal is a pleasant 75-year-old male with a past medical history of Parkinson's disease, hypertension, hyperlipidemia, and paroxysmal atrial fibrillation. He is presenting for an annual preventive visit.    He reports a decline in his Parkinson's disease symptoms since his last visit, with increased shakiness and stiffness. He experiences tremors in his fingers and tongue approximately 4.5 hours after taking carbidopa/levodopa, which he administers 3 times daily. He has been on Azilect for an extended period and started carbidopa/levodopa a few years later. Despite these symptoms, he maintains good balance and strength through cycling, rowing, and physical therapy. He had one fall incident in the past year due to tripping over a rug while carrying items. Prior to his Parkinson's diagnosis, he underwent physical therapy for observed symptoms.    He monitors his blood pressure at home, which typically ranges from 115 to 120 systolic. His valsartan dosage was recently adjusted from half a pill daily to half a pill every other day.     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 He uses sunscreen and wears a hat for sun protection during outdoor activities such as golf.    He reports satisfactory memory function, with no difficulties in recalling dates, names, or appointments. He has a mild cataract in his right eye, which slightly impairs his night driving ability, but otherwise, his driving is unaffected. His hearing is within normal limits. He received the shingles vaccine and typically receives the influenza vaccine annually. He had a colonoscopy last year, during which polyps were identified. His PSA levels were checked last year. He continues  to see his cardiologist annually, with the most recent visit a few months ago. He experienced a brief episode of atrial fibrillation prior to his last cardiology visit, which resolved spontaneously and has not recurred.    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    Osteoarthritis of left knee 09/28/2022    Secondary hypercoagulable state (HCC) 01/06/2022    Effusion of left elbow 10/20/2021    Long term (current) use of anticoagulants 08/05/2021    Parkinson's disease (HCC) 04/08/2020    Gastroesophageal reflux disease without esophagitis 01/21/2020    Family history of prostate cancer 04/22/2019    Impaired fasting glucose 03/11/2019    Paroxysmal atrial fibrillation (HCC) 02/11/2019    History of basal cell carcinoma (BCC) of skin 02/11/2019    History of varicocele 02/11/2019    Essential hypertension 01/15/2019       Current Outpatient Medications   Medication Sig Dispense Refill    omeprazole (PRILOSEC) 20 MG delayed-release capsule TAKE 1 CAPSULE BY MOUTH EVERY DAY EVERY MORNING FOR 30 DAYS POST ABLATION TO PROTECT YOUR ESOPHAGUS      apixaban (ELIQUIS) 5mg Tab TAKE 1 TABLET BY MOUTH TWICE A  Tablet 3    valsartan (DIOVAN) 80 MG Tab Take 0.5 Tablets by mouth every day. 45 Tablet 3    rasagiline (AZILECT) 1 MG Tab Take 1 Tablet by mouth every day. 90 Tablet 3    carbidopa-levodopa (SINEMET)  MG Tab Take 1 Tablet by mouth 3 times a day. 270 Tablet 3    fluticasone (FLONASE) 50 MCG/ACT nasal spray Spray 1 Spray in nose 1 time daily as needed (ALLERGY).       No current facility-administered medications for this visit.          Current supplements as per medication list.     Allergies: Molds & smuts    Current social contact/activities: golf     He  reports that he has never smoked. He has never used smokeless tobacco. He reports current alcohol use of about 8.4 oz of alcohol  per week. He reports that he does not use drugs.  Counseling given: Not Answered      ROS:    Gait: Uses no assistive device  Ostomy: No  Other tubes: No  Amputations: No  Chronic oxygen use: No  Last eye exam: 2024  Wears hearing aids: No   : Denies any urinary leakage during the last 6 months    Screening:    Depression Screening  Little interest or pleasure in doing things?  0 - not at all  Feeling down, depressed , or hopeless? 0 - not at all  Patient Health Questionnaire Score: 0     If depressive symptoms identified deferred to follow up visit unless specifically addressed in assessment and plan.    Interpretation of PHQ-9 Total Score   Score Severity   1-4 No Depression   5-9 Mild Depression   10-14 Moderate Depression   15-19 Moderately Severe Depression   20-27 Severe Depression    Screening for Cognitive Impairment  Do you or any of your friends or family members have any concern about your memory? No  Three Minute Recall (Village, Kitchen, Baby) 3/3    Jimbo clock face with all 12 numbers and set the hands to show 10 minutes past 11.  Yes    Cognitive concerns identified deferred for follow up unless specifically addressed in assessment and plan.    Fall Risk Assessment  Has the patient had two or more falls in the last year or any fall with injury in the last year?  No    Safety Assessment  Do you always wear your seatbelt?  Yes  Any changes to home needed to function safely? No  Difficulty hearing.  No  Patient counseled about all safety risks that were identified.    Functional Assessment ADLs  Are there any barriers preventing you from cooking for yourself or meeting nutritional needs?  No.    Are there any barriers preventing you from driving safely or obtaining transportation?  No.    Are there any barriers preventing you from using a telephone or calling for help?  No    Are there any barriers preventing you from shopping?  No.    Are there any barriers preventing you from taking care of your own  finances?  No    Are there any barriers preventing you from managing your medications?  No    Are there any barriers preventing you from showering, bathing or dressing yourself? No    Are there any barriers preventing you from doing housework or laundry? No  Are there any barriers preventing you from using the toilet?No  Are you currently engaging in any exercise or physical activity?  Yes. Work out daily    Self-Assessment of Health  What is your perception of your health? Good    Do you sleep more than six hours a night? Yes    In the past 7 days, how much did pain keep you from doing your normal work? None    Do you spend quality time with family or friends (virtually or in person)? Yes    Do you usually eat a heart healthy diet that constists of a variety of fruits, vegetables, whole grains and fiber? Yes    Do you eat foods high in fat and/or Fast Food more than three times per week? No    How concerned are you that your medical conditions are not being well managed? Not at all    Are you worried that in the next 2 months, you may not have stable housing that you own, rent, or stay in as part of a household? No      Advance Care Planning  Do you have an Advance Directive, Living Will, Durable Power of , or POLST? Yes               Health Maintenance Summary            Current Care Gaps       COVID-19 Vaccine (4 - 2024-25 season) Overdue since 9/1/2024 11/12/2021  Imm Admin: PFIZER PURPLE CAP SARS-COV-2 VACCINATION (12+)    03/23/2021  Imm Admin: PFIZER PURPLE CAP SARS-COV-2 VACCINATION (12+)    03/02/2021  Imm Admin: PFIZER PURPLE CAP SARS-COV-2 VACCINATION (12+)                      Needs Review       Hepatitis C Screening  Tentatively Complete      01/16/2020  Hepatitis C Antibody component of HEP C VIRUS ANTIBODY                      Upcoming       Annual Wellness Visit (Yearly) Next due on 3/20/2026      03/20/2025  Visit Dx: Medicare annual wellness visit, subsequent    01/30/2024  Level of  Service: OR ANNUAL WELLNESS VISIT-INCLUDES PPPS SUBSEQUE*    01/30/2024  Visit Dx: Medicare annual wellness visit, subsequent    01/30/2024  Prob Dx: Medicare annual wellness visit, subsequent    01/30/2023  Level of Service: OR ANNUAL WELLNESS VISIT-INCLUDES PPPS SUBSEQUE*     Only the first 5 history entries have been loaded, but more history exists.            Colorectal Cancer Screening (Colonoscopy - Every 5 Years) Next due on 8/30/2029 08/30/2024  COLONOSCOPY RESULTS    08/08/2019  REFERRAL TO GI FOR COLONOSCOPY    01/01/2016  Colonoscopy (Done - pt states done 2016 due 5 years)              IMM DTaP/Tdap/Td Vaccine (4 - Td or Tdap) Next due on 1/6/2032 01/06/2022  Imm Admin: Tdap Vaccine    03/31/2010  Imm Admin: Tdap Vaccine    01/01/2010  Imm Admin: Tdap Vaccine                      Completed or No Longer Recommended       Influenza Vaccine (Series Information) Completed      11/13/2024  Imm Admin: Influenza high-dose trivalent (PF)    01/09/2024  Imm Admin: Influenza Vaccine, Quadrivalent, Adjuvanted (Pf)    11/21/2022  Imm Admin: Influenza Vaccine Adult HD    11/20/2022  Imm Admin: Influenza Vaccine Adult HD    10/20/2021  Imm Admin: Influenza Vaccine Adult HD      Only the first 5 history entries have been loaded, but more history exists.              Zoster (Shingles) Vaccines (Series Information) Completed      05/16/2024  Imm Admin: Zoster Vaccine Recombinant (RZV) (SHINGRIX)    01/09/2024  Imm Admin: Zoster Vaccine Recombinant (RZV) (SHINGRIX)              Pneumococcal Vaccine: 50+ Years (Series Information) Completed      04/22/2019  Imm Admin: Pneumococcal polysaccharide vaccine (PPSV-23)    12/12/2017  Imm Admin: Pneumococcal Conjugate Vaccine (Prevnar/PCV-13)              Hepatitis A Vaccine (Hep A) (Series Information) Aged Out      03/13/2018  Imm Admin: Hepatitis A Vaccine, Adult    05/19/2015  Imm Admin: Hepatitis A Vaccine, Adult              HPV Vaccines (Series Information) Aged  Out      No completion history exists for this topic.              Polio Vaccine (Inactivated Polio) (Series Information) Aged Out     No completion history exists for this topic.              Meningococcal Immunization (Series Information) Aged Out     No completion history exists for this topic.              Hepatitis B Vaccine (Hep B)  Discontinued      06/16/2015  Imm Admin: Hepatitis B Vaccine (Adol/Adult)    05/19/2015  Imm Admin: Hepatitis B Vaccine (Adol/Adult)                          Patient Care Team:  Erin Madden M.D. as PCP - General (Internal Medicine)  [facility]  Yobany Gray M.D. as Consulting Physician (Internal Medicine Clinical Cardiac Electrophysiology)  Aniket Waterman M.D. as Consulting Physician (Neurology)    Social History     Tobacco Use    Smoking status: Never    Smokeless tobacco: Never   Vaping Use    Vaping status: Never Used   Substance Use Topics    Alcohol use: Yes     Alcohol/week: 8.4 oz     Types: 7 Glasses of wine, 7 Standard drinks or equivalent per week     Comment: 2 glasses wine/night    Drug use: No     Family History   Problem Relation Age of Onset    Cancer Mother         bladder non-smoker    Dementia Father     Cancer Brother         prostate age 52     He  has a past medical history of Arrhythmia, Arthritis (03/04/2022), Bowel habit changes (03/04/2022), Cancer (HCC), Dental disorder (03/04/2022), GERD (gastroesophageal reflux disease), Hypertension, Parkinson disease (HCC), Rash (03/04/2022), and Snoring.   Past Surgical History:   Procedure Laterality Date    PB TOTAL KNEE ARTHROPLASTY Left 12/05/2022    Procedure: LEFT TOTAL KNEE ARTHROPLASTY;  Surgeon: Jim Hu M.D.;  Location: [facility];  Service: Orthopedics    ARTHROPLASTY      DENTAL EXTRACTION(S)      wisdom teeth removed at age 18    OTHER      mohs to nose, back       Exam:   /78 (BP Location: Left arm)   Pulse (!) 54   Temp 36.3  "°C (97.4 °F) (Temporal)   Resp 16   Ht 1.97 m (6' 5.56\")   Wt 109 kg (241 lb)   SpO2 97%  Body mass index is 28.17 kg/m².    Physical Exam  Constitutional:       Appearance: Normal appearance.   HENT:      Head: Normocephalic and atraumatic.   Cardiovascular:      Pulses: Normal pulses.      Heart sounds: Normal heart sounds.   Pulmonary:      Effort: Pulmonary effort is normal. No respiratory distress.      Breath sounds: Normal breath sounds. No wheezing.   Neurological:      General: No focal deficit present.      Mental Status: He is alert and oriented to person, place, and time.      Motor: Tremor present.   Psychiatric:         Mood and Affect: Mood normal.         Thought Content: Thought content normal.         Judgment: Judgment normal.       Hearing good.    Dentition good  Alert, oriented in no acute distress.  Eye contact is good, speech goal directed, affect calm  Assessment & Plan  1. Medicare annual wellness visit.  He is current with all vaccinations.  A comprehensive blood work panel will be ordered, including cholesterol, vitamin D, and PSA levels. He has been advised to undergo these tests after 04/04/2025 to ensure Medicare coverage for the PSA test. The results will be communicated via [employer]. If any abnormalities are detected in the blood work results, a follow-up appointment will be scheduled to discuss these findings.    2. Parkinson's disease.  His Parkinson's symptoms have worsened over the past year, particularly the shakiness in his fingers and tongue after about 4.5 hours post-medication. He is currently taking carbidopa/levodopa 3 times a day and Azilect (rasagiline). He has been working on keeping his legs strong through biking, rowing, and balance exercises with a physical therapist. No new physical therapy specific for Parkinson's has been initiated recently.    3. Hypertension.  His blood pressure readings at home have been stable around 115-120 mmHg. He is currently taking " half a pill of valsartan every other day. His blood pressure was slightly elevated during today's visit, likely due to recent stress from a procedure.    4. Squamous cell carcinoma of the nose.  S/p Mohs. He uses sunscreen and wears a floppy hat when playing golf to protect against sun exposure.    Problem List Items Addressed This Visit       Essential hypertension (Chronic)    Relevant Orders    Lipid Profile    TSH WITH REFLEX TO FT4    Medicare annual wellness visit, subsequent    Relevant Orders    CBC WITH DIFFERENTIAL    Comp Metabolic Panel     Other Visit Diagnoses         Vitamin D deficiency        Relevant Orders    VITAMIN D,25 HYDROXY (DEFICIENCY)      Prostate cancer screening        Relevant Orders    PROSTATE SPECIFIC AG SCREENING          Services suggested: No services needed at this time  Health Care Screening: Age-appropriate preventive services recommended by USPTF and ACIP covered by Medicare were discussed today. Services ordered if indicated and agreed upon by the patient.  Referrals offered: Community-based lifestyle interventions to reduce health risks and promote self-management and wellness, fall prevention, nutrition, physical activity, tobacco-use cessation, weight loss, and mental health services as per orders if indicated.    Discussion today about general wellness and lifestyle habits:    Prevent falls and reduce trip hazards; Cautioned about securing or removing rugs.  Have a working fire alarm and carbon monoxide detector;   Engage in regular physical activity and social activities     Follow-up:  1 year and pending labs     Please note that this dictation was created using voice recognition software. I have made every reasonable attempt to correct obvious errors, but I expect that there are errors of grammar and possibly content that I did not discover before finalizing the note.

## 2025-04-21 ENCOUNTER — OFFICE VISIT (OUTPATIENT)
Dept: NEUROLOGY | Facility: MEDICAL CENTER | Age: 75
End: 2025-04-21
Attending: PSYCHIATRY & NEUROLOGY
Payer: MEDICARE

## 2025-04-21 VITALS
HEART RATE: 52 BPM | SYSTOLIC BLOOD PRESSURE: 118 MMHG | TEMPERATURE: 97.2 F | OXYGEN SATURATION: 99 % | BODY MASS INDEX: 28.63 KG/M2 | WEIGHT: 242.51 LBS | HEIGHT: 77 IN | DIASTOLIC BLOOD PRESSURE: 64 MMHG

## 2025-04-21 DIAGNOSIS — G20.A1 PARKINSON'S DISEASE, UNSPECIFIED WHETHER DYSKINESIA PRESENT, UNSPECIFIED WHETHER MANIFESTATIONS FLUCTUATE (HCC): Primary | Chronic | ICD-10-CM

## 2025-04-21 PROCEDURE — 3078F DIAST BP <80 MM HG: CPT | Performed by: PSYCHIATRY & NEUROLOGY

## 2025-04-21 PROCEDURE — 99215 OFFICE O/P EST HI 40 MIN: CPT | Performed by: PSYCHIATRY & NEUROLOGY

## 2025-04-21 PROCEDURE — 3074F SYST BP LT 130 MM HG: CPT | Performed by: PSYCHIATRY & NEUROLOGY

## 2025-04-21 PROCEDURE — 99212 OFFICE O/P EST SF 10 MIN: CPT | Performed by: PSYCHIATRY & NEUROLOGY

## 2025-04-21 RX ORDER — RASAGILINE 1 MG/1
1 TABLET ORAL
Qty: 90 TABLET | Refills: 3 | Status: SHIPPED | OUTPATIENT
Start: 2025-04-21 | End: 2025-04-21 | Stop reason: SDUPTHER

## 2025-04-21 RX ORDER — CARBIDOPA AND LEVODOPA 25; 100 MG/1; MG/1
1 TABLET ORAL 3 TIMES DAILY
Qty: 270 TABLET | Refills: 3 | Status: SHIPPED | OUTPATIENT
Start: 2025-04-21 | End: 2025-04-21 | Stop reason: SDUPTHER

## 2025-04-21 RX ORDER — RASAGILINE 1 MG/1
1 TABLET ORAL
Qty: 90 TABLET | Refills: 3 | Status: SHIPPED
Start: 2025-04-21

## 2025-04-21 RX ORDER — CARBIDOPA AND LEVODOPA 25; 100 MG/1; MG/1
1 TABLET ORAL 3 TIMES DAILY
Qty: 270 TABLET | Refills: 3 | Status: SHIPPED | OUTPATIENT
Start: 2025-04-21

## 2025-04-21 RX ORDER — ROPINIROLE HYDROCHLORIDE 2 MG/1
TABLET, FILM COATED, EXTENDED RELEASE ORAL
Qty: 90 TABLET | Refills: 2 | Status: SHIPPED | OUTPATIENT
Start: 2025-04-21

## 2025-04-21 ASSESSMENT — ENCOUNTER SYMPTOMS
MEMORY LOSS: 0
TREMORS: 1
DEPRESSION: 0
NERVOUS/ANXIOUS: 0
HALLUCINATIONS: 0
FALLS: 0
CONSTIPATION: 1
INSOMNIA: 1

## 2025-04-21 ASSESSMENT — PATIENT HEALTH QUESTIONNAIRE - PHQ9: CLINICAL INTERPRETATION OF PHQ2 SCORE: 0

## 2025-04-21 ASSESSMENT — FIBROSIS 4 INDEX: FIB4 SCORE: 1.58

## 2025-04-21 NOTE — ASSESSMENT & PLAN NOTE
Things have progressed slightly, we have tried to achieve some benefit by adding entacapone to his Sinemet dosing regimen.  That we could try Rytary, I think he has had a state where he might respond to a dopamine agonist instead.  We will continue the rasagiline for now, though I suspect we may be able to discontinue when we follow-up.    The rationale for using a dopamine agonist in this stage of the disease was reviewed.  The idea is to bridge between the Sinemet doses, avoiding the wearing-off that he is now experiencing.  Absent cognitive impairment, he is less likely to suffer from issues of psychosis.  He is also much less likely to have issues with dyskinesias.    Requip ER 2 mg will be started every morning at 10 AM with his first dose of Sinemet.  In 3 weeks we will increase to a 4 mg dose, and then another 3 weeks 6 mg.  Side effects were reviewed.  We will communicate via [employer] in the interim.    As for the swallowing difficulties, speech evaluation and barium swallow will be obtained.  The risk of silent aspiration in patients with Parkinson's disease is elevated, he is already complaining of coughing at a relatively early part of his disease.    Orders:    Referral to Speech Therapy    DX-ESOPHAGUS - [id number]; Future    Ropinirole HCl 2 MG TABLET SR 24 HR; 1 tab daily for 3 weeks, increase by 1 tab every 3 weeks up to 3 tab daily    rasagiline (AZILECT) 1 MG Tab; Take 1 Tablet by mouth every day.    carbidopa-levodopa (SINEMET)  MG Tab; Take 1 Tablet by mouth 3 times a day.

## 2025-04-21 NOTE — PROGRESS NOTES
Subjective     Gopal Mallory Jr. is a 74 y.o. male who presents for follow-up, with a history of asymmetric, left-sided Parkinson's disease, having failed a recent trial of entacapone add on.    HPI    Gopal states that the addition of the entacapone to his Sinemet 3 times daily regimen proved too difficult to tolerate.  There would seem to provide some benefit with his tremor, this was outweighed by the drowsiness that he suffered from with its addition.  He is back on the Sinemet Avoid 25/100, single tablets taken at 10 AM, 5 PM and then 10 PM.  The late evening dose allows him to feel good in the morning, he gets up at about 7 AM, and then does well until he takes his first dose.    The left hand tremor is becoming more pronounced, the wearing-off he was dealing with after 5 hours has become more precipitous.  He still tolerates the drug otherwise, he does not have problems with drowsiness, syncope, hallucinations, etc.  He has noted an oral buccal tremulousness and dyskinesia occurring as a wearing-off phenomena, this improves along with the left hand tremor with his Sinemet.    Balance is still slightly off, he is cautious when walking to not trip with the left foot.  He does well otherwise.  He has not been falling.  There are no major fluctuations from day today, he denies on/off episodes, there are no freezing episodes.    Cognition is intact, there may be a little slowing or distractibility, but otherwise he has had no problems requiring assistance from others.  Tasks are completed, he seems to multitask normally.  Mood is stable, he denies anxiety, depression, SI or HI.  Voice volume is diminished, he is noting a bit more problem with salivation, swallowing is becoming more regularly problematic for him.  This is especially true with liquids, and if he swallows too quickly or drinks too much at a time.  He uses a turmeric supplement for bowel regularity.  He has no issues with urinary  "control.  He has some difficulty with sleep initiation, when it occurs, Melatonin taken as needed works wonders.  He denies any history suggestive of RBD.    He is on Sinemet 25/100 3 times daily and Azilect 1 mg with his first dose of Sinemet.    Medical, surgical and family histories are reviewed, there are no new drug allergies.  He has continued to have surgical removal of multiple skin malignancies, all either squamous or basal cell in nature.  Other than the above, he is also on Diovan, Eliquis and Prilosec.    Review of Systems   Constitutional:  Negative for malaise/fatigue.   Gastrointestinal:  Positive for constipation.   Musculoskeletal:  Negative for falls.   Neurological:  Positive for tremors.   Psychiatric/Behavioral:  Negative for depression, hallucinations and memory loss. The patient has insomnia. The patient is not nervous/anxious.    All other systems reviewed and are negative.    Objective     /64 (BP Location: Left arm, Patient Position: Sitting, BP Cuff Size: Adult)   Pulse (!) 52   Temp 36.2 °C (97.2 °F) (Temporal)   Ht 1.956 m (6' 5\")   Wt 110 kg (242 lb 8.1 oz)   SpO2 99%   BMI 28.76 kg/m²      Physical Exam    He appears in no acute distress.  Vital signs are stable.  There is no malar rash or sialorrhea.  There is no jaw or temporal tenderness, or jaw claudication.  There is a permanent ptosis of the left eye following surgery for basal cell carcinoma.  The neck is supple, range of motion is full.  Cardiac evaluation reveals an irregular rhythm.     Neurological Exam    Fully oriented, there is no aphasia, apraxia, or inattention.    PERRLA/EOMI, there is mild hypophonia but also bradykinesia which remains unchanged, eyeblink frequency notably diminished.  There is slight tachyphemia, no dysarthria.  Visual fields are full, facial movements are symmetric.  Sensory exam is intact to temperature.  The tongue and uvula are midline, lateral tongue movements are slowed.  Shoulder " shrug and head rotation are symmetric.    Musculoskeletal exam reveals tremor and increased rigidity with the left upper extremity, bradykinesia still more generalized.  There is no asterixis or drift.  Strength is intact in all 4 extremities.  EPS posturing with the left hand when held sustained against gravity.  Reflex exam is deferred.    Repetitive movements with the left hand and left foot are actually slowed in frequency when compared to the right but amplitudes are diminished on the left side.  There is no appendicular dystaxia with any of the extremities.  He stands independently, there is a slight drag with the left leg as he first walks but then stride length is maintained symmetrically but still diminished, armswing is actually symmetric, tremor with the left hand does not reemerge.  He requires 3 steps to turn.    Sensory exam is grossly intact to light touch and vibration.  Assessment & Plan  Parkinson's disease, unspecified whether dyskinesia present, unspecified whether manifestations fluctuate (HCC)  Things have progressed slightly, we have tried to achieve some benefit by adding entacapone to his Sinemet dosing regimen.  That we could try Rytary, I think he has had a state where he might respond to a dopamine agonist instead.  We will continue the rasagiline for now, though I suspect we may be able to discontinue when we follow-up.    The rationale for using a dopamine agonist in this stage of the disease was reviewed.  The idea is to bridge between the Sinemet doses, avoiding the wearing-off that he is now experiencing.  Absent cognitive impairment, he is less likely to suffer from issues of psychosis.  He is also much less likely to have issues with dyskinesias.    Requip ER 2 mg will be started every morning at 10 AM with his first dose of Sinemet.  In 3 weeks we will increase to a 4 mg dose, and then another 3 weeks 6 mg.  Side effects were reviewed.  We will communicate via [employer] in the  interim.    As for the swallowing difficulties, speech evaluation and barium swallow will be obtained.  The risk of silent aspiration in patients with Parkinson's disease is elevated, he is already complaining of coughing at a relatively early part of his disease.    Orders:    Referral to Speech Therapy    DX-ESOPHAGUS - [id number]; Future    Ropinirole HCl 2 MG TABLET SR 24 HR; 1 tab daily for 3 weeks, increase by 1 tab every 3 weeks up to 3 tab daily    rasagiline (AZILECT) 1 MG Tab; Take 1 Tablet by mouth every day.    carbidopa-levodopa (SINEMET)  MG Tab; Take 1 Tablet by mouth 3 times a day.    We will follow-up in the next 4-5 months.    Time: 40 minutes in total spent on patient care including pre-charting, record review, discussion with healthcare staff and documentation.  This includes face-to-face time for exam, review, discussion, as well as counseling and coordinating care.

## 2025-04-23 NOTE — Clinical Note
REFERRAL APPROVAL NOTICE         Sent on April 23, 2025                   Gopal Mallory Jr.  [address] [place] [address]                   Dear Mr. Mallory,    After a careful review of the medical information and benefit coverage, [facility] has processed your referral. See below for additional details.    If applicable, you must be actively enrolled with your insurance for coverage of the authorized service. If you have any questions regarding your coverage, please contact your insurance directly.    REFERRAL INFORMATION   Referral #:  [id number]  Referred-To Department    Referred-By Provider:  Speech Therapy    Aniket Waterman M.D.   Speech Therapy [address]      [address]  [address]  [place] [id number] [phone] [address]  [address]  [place] [phone]    Referral Start Date:  04/21/2025  Referral End Date:   04/21/2026             SCHEDULING  If you do not already have an appointment, please call [phone] to make an appointment.     MORE INFORMATION  If you do not already have a [employer] account, sign up at: [email or web address]  You can access your medical information, make appointments, see lab results, billing information, and more.  If you have questions regarding this referral, please contact  the [facility] Referrals department at:             [phone]. Monday - Friday 8:00AM - 5:00PM.     Sincerely,    [facility]

## 2025-05-14 ENCOUNTER — APPOINTMENT (OUTPATIENT)
Dept: SPEECH THERAPY | Facility: MEDICAL CENTER | Age: 75
End: 2025-05-14
Attending: PSYCHIATRY & NEUROLOGY
Payer: MEDICARE

## 2025-05-21 ENCOUNTER — SPEECH THERAPY (OUTPATIENT)
Dept: SPEECH THERAPY | Facility: MEDICAL CENTER | Age: 75
End: 2025-05-21
Attending: PSYCHIATRY & NEUROLOGY
Payer: MEDICARE

## 2025-05-21 DIAGNOSIS — R13.12 OROPHARYNGEAL DYSPHAGIA: ICD-10-CM

## 2025-05-21 DIAGNOSIS — G20.A1 PARKINSON'S DISEASE, UNSPECIFIED WHETHER DYSKINESIA PRESENT, UNSPECIFIED WHETHER MANIFESTATIONS FLUCTUATE (HCC): Primary | ICD-10-CM

## 2025-05-21 PROCEDURE — 92610 EVALUATE SWALLOWING FUNCTION: CPT

## 2025-05-21 ASSESSMENT — ENCOUNTER SYMPTOMS: NO PATIENT REPORTED PAIN: 1

## 2025-05-21 NOTE — OP THERAPY EVALUATION
"  Outpatient Speech Therapy  INITIAL EVALUATION    [facility] Outpatient Speech Therapy  [address]  [place] [address]  Phone:  [phone]  Fax:  [phone]    Date of Evaluation: 05/21/2025    Patient: Gopal Mallory Jr.  YOB: 1950  MRN: [id number]     Referring Provider: Aniket Waterman M.D.  [address] [place] [address]  [address]  [place] [address]   Referring Diagnosis Parkinson's disease without dyskinesia, without mention of fluctuations [G20.A1]     Time Calculation    Start time: 1045  Stop time: 1132 Time Calculation (min): 47 minutes           Chief Complaint: Difficulty Swallowing    Visit Diagnoses     ICD-10-CM   1. Parkinson's disease, unspecified whether dyskinesia present, unspecified whether manifestations fluctuate ([facility])  G20.A1   2. Oropharyngeal dysphagia  R13.12     Subjective:   Reason for Therapy:     Reason For Evaluation:  Dysphagia and Voice    Onset Date:  5/22/2023    Onset Description:  Patient reports changes to volume of voice and occasionally \"mumbled\" speech. Also reports intermittent coughing with thin liquids over last few year, which improved with use of Prilosec.   Social Support:     Patient Mental Status:  Alert  Pain:     no pain reported    Therapy History:     Previous Treatments and Dates:  No history of speech therapy     Current Diet:  Regular Diet and Thin Liquids    Dentition:  Complete Dentition  Additional Subjective Comments:      Patient states he not too concerned with voice or swallow function at this point. He denies difficulties with speech intelligibility, but understand that his volume has decreased.   Reports occasional cough and \"burning\" sensation with thin liquids, but states this improved with use of prilosec.     Past Medical History[1]  Past Surgical History[2]  Objective:   Treatments/Interventions Performed:  Home program, Patient/Caregiver education, Compensatory strategy training, Voice " "training and Dysphagia treatment  Objective Details:  Patient participated in clinical swallow evaluation and informal assessment of voice.     Speech Therapy Assessment:     Speech Mechanism Assessment:     Patient voice description: Reduced Intensity    Patient's oral movements are voluntary and coordination: WFL    Patient speaks fluently: WFL    Patient exhibits articulatory precision: WFL    Patient exhibits conversational intelligibility: WFL    Patient dysarthria: WFL    Patient uses adequate breath support: Yes    Patient breath rate: Slow  Speech mechanism comments: sustained phonation time /a/ 19 seconds average, good volume voicing, no pitch breaks.  Patient accurate in pitch change to a scale (5 notes) and increasing volume in a controlled manner. Voice production was noted to be mostly effective. Patient denies issues with being understood by familiar and new listeners.   Reviewed \"parkinson's and voice\" educational handout.     Oral Motor Status:     Labial strength and control for patient: Good    Lingual strength and control for patient: Good (fasciculations at rest)    Patient saliva management: Good    Patient awareness of swallow problem: Good    Previous modified barium swallow: No    Oral motor status comments: Isolated oral mech exam was unremarkable other than lingual fasciculations at rest.     Oral Phase Assessment:     Types of food within functional limits: Puree, Mechanical Soft, Regular and Thin Liquid    Oral phase comments: Appropriate mastication of solid textures. No anterior spillage or oral residue noted post swallows.     Pharyngeal Phase Assessment:     Delayed Swallow    Food types within functional limits: Puree, Mechanical Soft, Regular and Thin Liquid    Pharyngeal phase comments: Slightly delayed initiation of swallow. No overt s/sx of aspiration were noted with all textures assessed. No c/o globus sensation.   Although it was not observed today, patient does report " occasional coughing with thin liquids. Reviewed safe swallow strategies including:    Take small bites and sips.   Swallow 2 times after each bite and each drink   Alternate between taking a bite and taking a drink during meals.    Always clear your mouth of food before taking another bite or drink.  No talking while chewing.  Take your time, eat slowly.   If your voice sounds wet, clear your throat hard and swallow.  Make sure your voice is clear before taking another bite or sip.      Speech Therapy Plan :   Prognosis & Recommendations  Impression Summary:  Patient presented with slight dysarthria, characterized by low volume voicing and periods of reduced articulatory accuracy, however, patient was 100% intelligible and denies concerns related to voice at this time.   Education was completed with patient with handout provided on compensatory speech strategies to trial including breath before speech, speak louder than usual, separate each word, over-articulation. Patient verbalized understanding to all education provided.   Oral-pharyngeal dysphagia was not observed during this assessment, however, patient c/o intermittent coughing with thin liquids. MBSS is scheduled for 6/11/25. Recommend continue ST depending on findings from scheduled MBSS. POC related to dysphagia and safe swallow strategies were discussed with patient who is an agreement.   Compensatory swallow strategies:  Reduce bolus size, Cough/clear wet vocal quality after swallow, No straws and Multiple swallows  Diet Recommendation:  Normal Consistency  Liquid Recommendation:  Thin  Goals  Short Term Goals:  1. Patient will participate in MBSS to further assess swallow function.   2. Patient will demonstrate use of safe swallow strategies 90% of the time.   Short Term Goal Duration (Weeks):  4-6 weeks  Long Term Goals:  1. Patient will tolerate least restrictive diet without overt s/sx of aspiration.   Long Term Goal Duration (Weeks):  2-4  "months  Therapy Recommendations  Recommendation:  Brief individual speech therapy and Modified Barium Swallow Study, Speech therapy on hold until MBSS is completed. Follow-up scheduled after MBSS to review findings and determine need for further ST.   Planned Therapy Interventions:  Swallow Dysfunction treatment, Dysphagia treatment, Patient/Caregiver Education, Compensatory Strategy Training and Home Program,   Frequency:  1x month  Duration (in visits):  2  Duration (in weeks):  4  Plan Comments  Additional comments:  Frequency to be adjusted pending results from MBSS      Functional Assessment Used   EAT-10: 4  MASA: 177    Referring provider co-signature:  I have reviewed this plan of care and my co-signature certifies the need for services.    Certification Period: 05/21/2025 to  07/02/25    Physician Signature: ________________________________ Date: ______________                 [1]   Past Medical History:  Diagnosis Date    Arrhythmia     Afib    Arthritis 03/04/2022    Knuckles and knees.    Bowel habit changes 03/04/2022    Constipation    Cancer (HCC)     Basal cell/ Squamous skin    Dental disorder 03/04/2022    Waiting for implant front top tooth.  Tooth pulled a couple of months ago.    GERD (gastroesophageal reflux disease)     Hypertension     Parkinson disease (HCC)     Rash 03/04/2022    \"Jock Rash\"    Snoring     Negative sleep study.   [2]   Past Surgical History:  Procedure Laterality Date    PB TOTAL KNEE ARTHROPLASTY Left 12/05/2022    Procedure: LEFT TOTAL KNEE ARTHROPLASTY;  Surgeon: Jim Hu M.D.;  Location: [facility];  Service: Orthopedics    ARTHROPLASTY      DENTAL EXTRACTION(S)      wisdom teeth removed at age 18    OTHER      mohs to nose, back     "

## 2025-05-28 ENCOUNTER — APPOINTMENT (OUTPATIENT)
Dept: SPEECH THERAPY | Facility: MEDICAL CENTER | Age: 75
End: 2025-05-28
Attending: PSYCHIATRY & NEUROLOGY
Payer: MEDICARE

## 2025-06-04 ENCOUNTER — APPOINTMENT (OUTPATIENT)
Dept: SPEECH THERAPY | Facility: MEDICAL CENTER | Age: 75
End: 2025-06-04
Attending: PSYCHIATRY & NEUROLOGY
Payer: MEDICARE

## 2025-06-05 ENCOUNTER — HOSPITAL ENCOUNTER (OUTPATIENT)
Dept: LAB | Facility: MEDICAL CENTER | Age: 75
End: 2025-06-05
Attending: INTERNAL MEDICINE
Payer: MEDICARE

## 2025-06-05 DIAGNOSIS — Z12.5 PROSTATE CANCER SCREENING: ICD-10-CM

## 2025-06-05 DIAGNOSIS — E55.9 VITAMIN D DEFICIENCY: ICD-10-CM

## 2025-06-05 DIAGNOSIS — I10 ESSENTIAL HYPERTENSION: ICD-10-CM

## 2025-06-05 DIAGNOSIS — Z00.00 MEDICARE ANNUAL WELLNESS VISIT, SUBSEQUENT: ICD-10-CM

## 2025-06-05 LAB
25(OH)D3 SERPL-MCNC: 26 NG/ML (ref 30–100)
ALBUMIN SERPL BCP-MCNC: 4.3 G/DL (ref 3.2–4.9)
ALBUMIN/GLOB SERPL: 1.7 G/DL
ALP SERPL-CCNC: 84 U/L (ref 30–99)
ALT SERPL-CCNC: 13 U/L (ref 2–50)
ANION GAP SERPL CALC-SCNC: 10 MMOL/L (ref 7–16)
AST SERPL-CCNC: 20 U/L (ref 12–45)
BASOPHILS # BLD AUTO: 0.6 % (ref 0–1.8)
BASOPHILS # BLD: 0.03 K/UL (ref 0–0.12)
BILIRUB SERPL-MCNC: 0.7 MG/DL (ref 0.1–1.5)
BUN SERPL-MCNC: 15 MG/DL (ref 8–22)
CALCIUM ALBUM COR SERPL-MCNC: 8.9 MG/DL (ref 8.5–10.5)
CALCIUM SERPL-MCNC: 9.1 MG/DL (ref 8.5–10.5)
CHLORIDE SERPL-SCNC: 101 MMOL/L (ref 96–112)
CHOLEST SERPL-MCNC: 162 MG/DL (ref 100–199)
CO2 SERPL-SCNC: 27 MMOL/L (ref 20–33)
CREAT SERPL-MCNC: 1 MG/DL (ref 0.5–1.4)
EOSINOPHIL # BLD AUTO: 0.05 K/UL (ref 0–0.51)
EOSINOPHIL NFR BLD: 1.1 % (ref 0–6.9)
ERYTHROCYTE [DISTWIDTH] IN BLOOD BY AUTOMATED COUNT: 40.4 FL (ref 35.9–50)
FASTING STATUS PATIENT QL REPORTED: NORMAL
GFR SERPLBLD CREATININE-BSD FMLA CKD-EPI: 80 ML/MIN/1.73 M 2
GLOBULIN SER CALC-MCNC: 2.6 G/DL (ref 1.9–3.5)
GLUCOSE SERPL-MCNC: 102 MG/DL (ref 65–99)
HCT VFR BLD AUTO: 48.5 % (ref 42–52)
HDLC SERPL-MCNC: 48 MG/DL
HGB BLD-MCNC: 15.9 G/DL (ref 14–18)
IMM GRANULOCYTES # BLD AUTO: 0.01 K/UL (ref 0–0.11)
IMM GRANULOCYTES NFR BLD AUTO: 0.2 % (ref 0–0.9)
LDLC SERPL CALC-MCNC: 95 MG/DL
LYMPHOCYTES # BLD AUTO: 1.48 K/UL (ref 1–4.8)
LYMPHOCYTES NFR BLD: 31.4 % (ref 22–41)
MCH RBC QN AUTO: 29.2 PG (ref 27–33)
MCHC RBC AUTO-ENTMCNC: 32.8 G/DL (ref 32.3–36.5)
MCV RBC AUTO: 89.2 FL (ref 81.4–97.8)
MONOCYTES # BLD AUTO: 0.35 K/UL (ref 0–0.85)
MONOCYTES NFR BLD AUTO: 7.4 % (ref 0–13.4)
NEUTROPHILS # BLD AUTO: 2.8 K/UL (ref 1.82–7.42)
NEUTROPHILS NFR BLD: 59.3 % (ref 44–72)
NRBC # BLD AUTO: 0 K/UL
NRBC BLD-RTO: 0 /100 WBC (ref 0–0.2)
PLATELET # BLD AUTO: 191 K/UL (ref 164–446)
PMV BLD AUTO: 9.4 FL (ref 9–12.9)
POTASSIUM SERPL-SCNC: 4.4 MMOL/L (ref 3.6–5.5)
PROT SERPL-MCNC: 6.9 G/DL (ref 6–8.2)
PSA SERPL DL<=0.01 NG/ML-MCNC: 1.26 NG/ML (ref 0–4)
RBC # BLD AUTO: 5.44 M/UL (ref 4.7–6.1)
SODIUM SERPL-SCNC: 138 MMOL/L (ref 135–145)
TRIGL SERPL-MCNC: 97 MG/DL (ref 0–149)
TSH SERPL DL<=0.005 MIU/L-ACNC: 2.92 UIU/ML (ref 0.38–5.33)
WBC # BLD AUTO: 4.7 K/UL (ref 4.8–10.8)

## 2025-06-05 PROCEDURE — 85025 COMPLETE CBC W/AUTO DIFF WBC: CPT

## 2025-06-05 PROCEDURE — 82306 VITAMIN D 25 HYDROXY: CPT

## 2025-06-05 PROCEDURE — 80053 COMPREHEN METABOLIC PANEL: CPT

## 2025-06-05 PROCEDURE — 84443 ASSAY THYROID STIM HORMONE: CPT

## 2025-06-05 PROCEDURE — 36415 COLL VENOUS BLD VENIPUNCTURE: CPT

## 2025-06-05 PROCEDURE — 80061 LIPID PANEL: CPT

## 2025-06-05 PROCEDURE — 84153 ASSAY OF PSA TOTAL: CPT | Mod: GA

## 2025-06-09 ENCOUNTER — RESULTS FOLLOW-UP (OUTPATIENT)
Dept: MEDICAL GROUP | Age: 75
End: 2025-06-09
Payer: MEDICARE

## 2025-06-11 ENCOUNTER — HOSPITAL ENCOUNTER (OUTPATIENT)
Dept: RADIOLOGY | Facility: MEDICAL CENTER | Age: 75
End: 2025-06-11
Attending: PSYCHIATRY & NEUROLOGY
Payer: MEDICARE

## 2025-06-11 ENCOUNTER — APPOINTMENT (OUTPATIENT)
Dept: SPEECH THERAPY | Facility: MEDICAL CENTER | Age: 75
End: 2025-06-11
Attending: PSYCHIATRY & NEUROLOGY
Payer: MEDICARE

## 2025-06-11 DIAGNOSIS — R13.12 OROPHARYNGEAL DYSPHAGIA: Primary | ICD-10-CM

## 2025-06-11 DIAGNOSIS — G20.A1 PARKINSON'S DISEASE, UNSPECIFIED WHETHER DYSKINESIA PRESENT, UNSPECIFIED WHETHER MANIFESTATIONS FLUCTUATE (HCC): Chronic | ICD-10-CM

## 2025-06-11 PROCEDURE — 92611 MOTION FLUOROSCOPY/SWALLOW: CPT | Performed by: SPEECH-LANGUAGE PATHOLOGIST

## 2025-06-11 PROCEDURE — 74230 X-RAY XM SWLNG FUNCJ C+: CPT

## 2025-06-12 RX ORDER — OMEPRAZOLE 20 MG/1
20 CAPSULE, DELAYED RELEASE ORAL DAILY
Qty: 90 CAPSULE | Refills: 0 | Status: SHIPPED | OUTPATIENT
Start: 2025-06-12

## 2025-06-13 NOTE — OP THERAPY EVALUATION
"[facility]  Outpatient Modified Barium Swallow Study           Patient Name: Gopal Mlalory Jr.  Date of Evaluation: 06/11/25  Referring Provider: Aniket Waterman MD  Fax: [phone]        Patient History/Reason for Referral  This is a 73 y/o male who was referred for a Modified Barium Swallow Study (MBSS) due to his history of PD. Pt reported that he was diagnosed with PD in 2020 and has noticed recently that he \"swallows wrong\". He reported that liquids are more challenging to drink, especially acidic ones like lemonade. He felt that he was having some trouble with solids but not as much as with liquids.       Current Method of Nutrition   Oral diet (Regular solids, thin liquids)      Pertinent Information  Affect/Behavior: Appropriate, Cooperative  Oxygen Requirements: Room Air  Secretion Management: WNL      Subjective  Pt was alert and cooperative for evaluation. He was unaccompanied today. Pt was Aox4.       Discussed procedure with the risks, benefits, and alternatives of the MBSS procedure. Patient/family acknowledged and agreed to proceed.    Assessment  Videofluoroscopic Swallow Study was conducted in the lateral projection(s) to evaluate oropharyngeal swallow function. A radiology tech was present to assist with the procedure.       Positioning: seated upright in fluoroscopy chair  Anatomic View: small suspected CP bar at C5    Bolus Administration: Patient  PO barium contrast trials: Varibar thin liquid, slightly thick barium (1:1 ratio of thin and nectar barium), Varibar nectar (mildly thick) liquid, Varibar pudding, solid coated in Varibar pudding      Consistency PAS Score Timing Comments   Thin Liquid 1 N/A    Slightly Thick Liquid 1 N/A    Mildly Thick Liquid 1 N/A    Pudding 1 N/A    Solid 1 N/A        Penetration-Aspiration Scale (PAS) (Rosenbek et al., 1996)  1     No contrast enters airway  2     Contrast enters the airway, remains above the vocal folds, and " is ejected from the airway (not seen in the airway at the end of the swallow).  3     Contrast enters the airway, remains above the vocal folds, and is not ejected from the airway (is seen in the airway after the swallow).  4     Contrast enters the airway, contacts the vocal folds, and is ejected from the airway.  5     Contrast enters the airway, contacts the vocal folds, and is not ejected from the airway  6     Contrast enters the airway, crosses the plane of the vocal folds, and is ejected from the airway.  7     Contrast enters the airway, crosses the plane of the vocal folds, and is not ejected from the airway despite effort.  8     Contrast enters the airway, crosses the plane of the vocal folds, is not ejected from the airway and there is no response to aspiration.      The following qualitative information was analyzed via the MBSImp Protocol (Delon et al., 2008):   Oral phase:  Lip closure was characterized by complete labial seal.   Tongue control during bolus hold was not assessed.   Bolus preparation/mastication was timely and efficient.   Bolus transport/lingual motion was delayed initiation of tongue motion.   Oral clearance was incomplete with trace residue lining oral structures.   Initiation of pharyngeal swallow was with the bolus head in valleculae at first hyoid excursion.       Pharyngeal phase:  Soft palate elevation was complete with no bolus between soft palate (SP)/pharyngeal wall (PW).  Laryngeal elevation was complete superior movement of thyroid cartilage with complete approximation of arytenoids to epiglottic petiole.   Anterior hyoid excursion was complete for anterior movement.  Epiglottic inversion was complete  Laryngeal vestibule closure was complete with no air/contrast in the laryngeal vestibule.  Pharyngeal stripping wave was present but diminished.    Pharyngoesophageal Segment Opening was complete with complete distension and complete duration with no obstruction of  "flow.  Tongue base retraction was mildly decreased with trace column of contrast or air between TB and PW.  Pharyngeal residue was mild with collection of residue within or on base of tongue, in the valleculae, and in the pyriform sinuses.       Standarized Timing Measures (milliseconds) (Migue et al., 2023):    Swallow Reaction Time (time from bolus passing mandible to hyoid burst):  Thin Slightly-thick Mildly-thick   1st sip: 429ms  2nd sip: 726ms  3rd sip: 363ms  1st sip: 165ms  2nd sip: 165ms   3rd sip: 297ms  1st sip: 1023ms   2nd sip: 594ms   3rd sip: 132ms      Laryngeal Vestibule Closure (LVC) duration (time from LVC to LVO):   Thin Slightly-thick Mildly-thick   1st sip: 429ms   2nd sip: 396ms   3rd sip: 396ms  1st sip: 330ms   2nd sip: 330ms   3rd sip: 363ms  1st sip: 363ms   2nd sip: 363ms   3rd sip: 330ms       Time-to-LVC (time from hyoid burst to LVC):   Thin Slightly-thick Mildly-thick   1st sip: 132ms   2nd sip: 99ms   3rd sip: 132ms  1st sip: 132ms   2nd sip: 99ms   3rd sip: 132ms  1st sip: 132ms   2nd sip: 99ms   3rd sip: 99ms      UES opening duration (time from UES open to UES close):   Thin Slightly-thick Mildly-thick   1st sip: 660ms   2nd sip: 561ms   3rd sip: 495ms  1st sip: 495ms   2nd sip: 462ms   3rd sip: 561ms  1st sip: 462ms   2nd sip: 528ms   3rd sip: 495ms                        Sip volume by ml (Migue et al., 2023)  The patient was instructed to take a \"comfortable sip\" following the protocol of the study completed by Migue et al., 2023. Age was not found to be a factor for sip volume, there were only notable sex differences. All measurements were taken with a Ohaus scale in grams, then converted to ml.     1st sip thin liquids: 13.88ml   2nd sip thin liquids: 11.34ml   3rd sip thin liquids: 11.30ml     1st sip slightly thick liquids: 9.32ml    2nd sip slightly thick liquids: 9.33ml   3rd sip slightly thick liquids: 10.64ml       1st sip mildly-thick liquids: 9.11ml   2nd sip " mildly-thick liquids: 7.14ml   3rd sip mildly-thick liquids: 8.5ml           DIGEST Level (Kehinde et al., 2017):  Safety stGstrstastdstest:st1st Efficiency ndGndrndanddndend:nd nd2nd Total ndGndrndanddndend:nd nd2nd Compensatory Strategies:        Clinical Impressions  The pt presents with a total DIGEST score of 1 with a safety score of 0 and an efficiency score of 1 indicating mild oropharyngeal dysphagia.       Education and Informed Consent  Pt was educated on the mild residue noted and how he may benefit from addressing it in therapy. He was educated on a research project being completed with this SLP at the [facility] with people with Parkinson's to study their swallowing and a treatment for it. He verbalized he would be interested in participating in that project.     Recommendations  Recommend pt follow up to complete the research study at [facility] if he wishes. Please call [phone] if he wishes to.   Otherwise, he could continue to benefit from intervention to address the efficiency of his swallow.         Thank you for the referral. For further questions, please call [phone].   Gosia Lord MS, CCC-SLP    References    JAZLYN Busby., ANTONY Patrick., RADHA Cardozo., NYDIA Gr., Bertha, H. Y., Bridget, R. S., ZAYDA Capellan., Christopher, S. Y., ZAYDA Pal., DIXIE Miles., Lazarus, C. L., GERI Mckeon., DIXIE Gardiner., Veto, J. W. G., Marcy, H. M., & Lewis, J. S. (2017). Dynamic imaging grade of swallowing toxicity (DIGEST): Scale development and validation. Cancer, 123(1), 62-70. https://doi.org/10.1002/cncr.37974    PASQUALE Jernigan., ANTONY Magdaleno., JESSICA Ibarra, RADHA Gandhi., ANTONY Mcrae, NYDIA Osorio, FLORIN Kay, & NYDIA Glaser (2008). MBS measurement tool for swallow impairment--MBSImp: Establishing a standard. Dysphagia, 23(4), 392-405. https://[email or web address]/10.1007/[email or web address]    NYDIA Sanabria., NYDIA Stubbs., SLIM Landon., NYDIA Harvey, & NYDIA Tovar. (1996). A penetration-aspiration scale. Dysphagia, 11(2), 93-98.  https://[email or web address]/10.1007/[email or web address]    ZAYDA Camarena., ANTONY Urban., ANTONY Moya., VERONICA Rosario, ANTONY Smith, & VERONICA French (2023). Reference values for videofluoroscopic measures of swallowing: An update. Journal of Speech, Language, and Hearing Research, 66(46), 1343-6176. https://[email or web address]/10.1044/6788_[email or web address]-[email or web address]

## 2025-06-18 ENCOUNTER — APPOINTMENT (OUTPATIENT)
Dept: SPEECH THERAPY | Facility: MEDICAL CENTER | Age: 75
End: 2025-06-18
Attending: PSYCHIATRY & NEUROLOGY
Payer: MEDICARE

## 2025-06-25 ENCOUNTER — APPOINTMENT (OUTPATIENT)
Dept: SPEECH THERAPY | Facility: MEDICAL CENTER | Age: 75
End: 2025-06-25
Attending: PSYCHIATRY & NEUROLOGY
Payer: MEDICARE

## 2025-07-02 ENCOUNTER — APPOINTMENT (OUTPATIENT)
Dept: SPEECH THERAPY | Facility: MEDICAL CENTER | Age: 75
End: 2025-07-02
Attending: PSYCHIATRY & NEUROLOGY
Payer: MEDICARE

## 2025-07-03 DIAGNOSIS — G20.A1 PARKINSON'S DISEASE, UNSPECIFIED WHETHER DYSKINESIA PRESENT, UNSPECIFIED WHETHER MANIFESTATIONS FLUCTUATE (HCC): Chronic | ICD-10-CM

## 2025-07-03 RX ORDER — ROPINIROLE HYDROCHLORIDE 2 MG/1
TABLET, FILM COATED, EXTENDED RELEASE ORAL
Qty: 90 TABLET | Refills: 13 | Status: SHIPPED | OUTPATIENT
Start: 2025-07-03

## 2025-07-03 NOTE — TELEPHONE ENCOUNTER
Received request via: Pharmacy    Medication Name/Dosage Ropinerole 2 mg     When was medication last prescribed 4/21/25    How many refills were previously provided 2    How many Refills does he patient have left from last prescription 0    Was the patient seen in the last year in this department? Yes   Date of last office visit 4/21/25     Per last Neurology Office Visit, when was the date of next follow up visit set for?             6 mths                Date of office visit follow up request 10/21/25      Does the patient have an upcoming appointment? Yes   If yes, when 10/27/25             If no, schedule appointment     Does the patient have [facility] and need 100 day supply (blood pressure, diabetes and cholesterol meds only)? Medication is not for blood pressure,diabetes, or cholesterol

## 2025-07-10 ENCOUNTER — TELEPHONE (OUTPATIENT)
Dept: SPEECH THERAPY | Facility: MEDICAL CENTER | Age: 75
End: 2025-07-10
Payer: MEDICARE

## 2025-08-20 DIAGNOSIS — Z00.6 RESEARCH STUDY PATIENT: Primary | ICD-10-CM
